# Patient Record
Sex: FEMALE | Race: WHITE | NOT HISPANIC OR LATINO | Employment: OTHER | URBAN - METROPOLITAN AREA
[De-identification: names, ages, dates, MRNs, and addresses within clinical notes are randomized per-mention and may not be internally consistent; named-entity substitution may affect disease eponyms.]

---

## 2019-05-17 ENCOUNTER — OFFICE VISIT (OUTPATIENT)
Dept: FAMILY MEDICINE CLINIC | Facility: CLINIC | Age: 84
End: 2019-05-17
Payer: MEDICARE

## 2019-05-17 VITALS
SYSTOLIC BLOOD PRESSURE: 116 MMHG | DIASTOLIC BLOOD PRESSURE: 74 MMHG | HEART RATE: 55 BPM | WEIGHT: 164.8 LBS | OXYGEN SATURATION: 96 %

## 2019-05-17 DIAGNOSIS — I10 ESSENTIAL HYPERTENSION: Primary | ICD-10-CM

## 2019-05-17 DIAGNOSIS — F03.90 DEMENTIA WITHOUT BEHAVIORAL DISTURBANCE, UNSPECIFIED DEMENTIA TYPE (HCC): ICD-10-CM

## 2019-05-17 DIAGNOSIS — E78.5 HYPERLIPIDEMIA, UNSPECIFIED HYPERLIPIDEMIA TYPE: ICD-10-CM

## 2019-05-17 DIAGNOSIS — G40.909 SEIZURE DISORDER (HCC): ICD-10-CM

## 2019-05-17 PROBLEM — F01.50 VASCULAR DEMENTIA WITHOUT BEHAVIORAL DISTURBANCE (HCC): Status: ACTIVE | Noted: 2019-05-17

## 2019-05-17 PROCEDURE — 99204 OFFICE O/P NEW MOD 45 MIN: CPT | Performed by: FAMILY MEDICINE

## 2019-05-17 RX ORDER — SIMVASTATIN 20 MG
TABLET ORAL
Refills: 0 | COMMUNITY
Start: 2019-03-14 | End: 2019-07-16 | Stop reason: SDUPTHER

## 2019-05-17 RX ORDER — GABAPENTIN 400 MG/1
CAPSULE ORAL
Refills: 2 | COMMUNITY
Start: 2019-04-14 | End: 2019-07-22 | Stop reason: SDUPTHER

## 2019-05-17 RX ORDER — LEVETIRACETAM 500 MG/1
TABLET ORAL
Refills: 2 | COMMUNITY
Start: 2019-03-14 | End: 2019-07-16 | Stop reason: SDUPTHER

## 2019-05-20 DIAGNOSIS — I10 ESSENTIAL HYPERTENSION: Primary | ICD-10-CM

## 2019-05-22 ENCOUNTER — TELEPHONE (OUTPATIENT)
Dept: FAMILY MEDICINE CLINIC | Facility: CLINIC | Age: 84
End: 2019-05-22

## 2019-07-16 DIAGNOSIS — I10 ESSENTIAL HYPERTENSION: ICD-10-CM

## 2019-07-16 DIAGNOSIS — E78.5 HYPERLIPIDEMIA, UNSPECIFIED HYPERLIPIDEMIA TYPE: ICD-10-CM

## 2019-07-16 DIAGNOSIS — G40.909 SEIZURE DISORDER (HCC): ICD-10-CM

## 2019-07-16 DIAGNOSIS — E78.5 HYPERLIPIDEMIA, UNSPECIFIED HYPERLIPIDEMIA TYPE: Primary | ICD-10-CM

## 2019-07-16 RX ORDER — LEVETIRACETAM 500 MG/1
1000 TABLET ORAL 2 TIMES DAILY
Qty: 360 TABLET | Refills: 2 | Status: SHIPPED | OUTPATIENT
Start: 2019-07-16 | End: 2019-07-22 | Stop reason: SDUPTHER

## 2019-07-16 RX ORDER — SIMVASTATIN 20 MG
20 TABLET ORAL
Qty: 90 TABLET | Refills: 1 | Status: SHIPPED | OUTPATIENT
Start: 2019-07-16 | End: 2019-07-22 | Stop reason: SDUPTHER

## 2019-07-16 RX ORDER — LEVETIRACETAM 500 MG/1
1000 TABLET ORAL 2 TIMES DAILY
Qty: 360 TABLET | Refills: 2 | Status: SHIPPED | OUTPATIENT
Start: 2019-07-16 | End: 2019-07-16 | Stop reason: SDUPTHER

## 2019-07-16 RX ORDER — SIMVASTATIN 20 MG
20 TABLET ORAL
Qty: 90 TABLET | Refills: 1 | Status: SHIPPED | OUTPATIENT
Start: 2019-07-16 | End: 2019-07-16 | Stop reason: SDUPTHER

## 2019-07-22 DIAGNOSIS — E78.5 HYPERLIPIDEMIA, UNSPECIFIED HYPERLIPIDEMIA TYPE: ICD-10-CM

## 2019-07-22 DIAGNOSIS — G40.909 SEIZURE DISORDER (HCC): Primary | ICD-10-CM

## 2019-07-22 DIAGNOSIS — I10 ESSENTIAL HYPERTENSION: ICD-10-CM

## 2019-07-22 RX ORDER — LEVETIRACETAM 500 MG/1
1000 TABLET ORAL 2 TIMES DAILY
Qty: 360 TABLET | Refills: 2 | Status: SHIPPED | OUTPATIENT
Start: 2019-07-22 | End: 2019-07-23 | Stop reason: SDUPTHER

## 2019-07-22 RX ORDER — SIMVASTATIN 20 MG
20 TABLET ORAL
Qty: 90 TABLET | Refills: 1 | Status: SHIPPED | OUTPATIENT
Start: 2019-07-22 | End: 2019-07-23 | Stop reason: SDUPTHER

## 2019-07-22 RX ORDER — GABAPENTIN 400 MG/1
400 CAPSULE ORAL 2 TIMES DAILY
Qty: 180 CAPSULE | Refills: 2 | Status: SHIPPED | OUTPATIENT
Start: 2019-07-22 | End: 2020-02-16

## 2019-07-23 DIAGNOSIS — I10 ESSENTIAL HYPERTENSION: ICD-10-CM

## 2019-07-23 DIAGNOSIS — E78.5 HYPERLIPIDEMIA, UNSPECIFIED HYPERLIPIDEMIA TYPE: ICD-10-CM

## 2019-07-23 DIAGNOSIS — G40.909 SEIZURE DISORDER (HCC): ICD-10-CM

## 2019-07-23 RX ORDER — SIMVASTATIN 20 MG
20 TABLET ORAL
Qty: 90 TABLET | Refills: 1 | Status: SHIPPED | OUTPATIENT
Start: 2019-07-23 | End: 2019-07-29

## 2019-07-23 RX ORDER — LEVETIRACETAM 500 MG/1
1000 TABLET ORAL 2 TIMES DAILY
Qty: 360 TABLET | Refills: 2 | Status: SHIPPED | OUTPATIENT
Start: 2019-07-23 | End: 2019-10-09

## 2019-07-29 ENCOUNTER — TELEPHONE (OUTPATIENT)
Dept: FAMILY MEDICINE CLINIC | Facility: CLINIC | Age: 84
End: 2019-07-29

## 2019-07-29 DIAGNOSIS — E78.5 HYPERLIPIDEMIA, UNSPECIFIED HYPERLIPIDEMIA TYPE: Primary | ICD-10-CM

## 2019-07-29 RX ORDER — LOVASTATIN 20 MG/1
20 TABLET ORAL
Qty: 90 TABLET | Refills: 3 | Status: SHIPPED | OUTPATIENT
Start: 2019-07-29 | End: 2020-05-07

## 2019-07-29 NOTE — TELEPHONE ENCOUNTER
Received letter from insurance that Zocor is not formulary  Pt received a 30 day supply  Alternative med to prescribe is Simvastin    If need to change: [facility] [phone]

## 2019-08-02 ENCOUNTER — CONSULT (OUTPATIENT)
Dept: NEUROLOGY | Facility: CLINIC | Age: 84
End: 2019-08-02
Payer: MEDICARE

## 2019-08-02 VITALS
SYSTOLIC BLOOD PRESSURE: 126 MMHG | HEIGHT: 65 IN | BODY MASS INDEX: 26.99 KG/M2 | HEART RATE: 52 BPM | DIASTOLIC BLOOD PRESSURE: 60 MMHG | WEIGHT: 162 LBS

## 2019-08-02 DIAGNOSIS — F02.80 ALZHEIMER'S DEMENTIA WITHOUT BEHAVIORAL DISTURBANCE, UNSPECIFIED TIMING OF DEMENTIA ONSET (HCC): ICD-10-CM

## 2019-08-02 DIAGNOSIS — I63.412 CEREBROVASCULAR ACCIDENT (CVA) DUE TO EMBOLISM OF LEFT MIDDLE CEREBRAL ARTERY (HCC): ICD-10-CM

## 2019-08-02 DIAGNOSIS — G30.9 ALZHEIMER'S DEMENTIA WITHOUT BEHAVIORAL DISTURBANCE, UNSPECIFIED TIMING OF DEMENTIA ONSET (HCC): ICD-10-CM

## 2019-08-02 DIAGNOSIS — G40.209 PARTIAL SYMPTOMATIC EPILEPSY WITH COMPLEX PARTIAL SEIZURES, NOT INTRACTABLE, WITHOUT STATUS EPILEPTICUS (HCC): Primary | ICD-10-CM

## 2019-08-02 PROCEDURE — 99204 OFFICE O/P NEW MOD 45 MIN: CPT | Performed by: PSYCHIATRY & NEUROLOGY

## 2019-08-02 RX ORDER — LEVETIRACETAM 250 MG/1
250 TABLET ORAL 2 TIMES DAILY
Qty: 60 TABLET | Refills: 5 | Status: SHIPPED | OUTPATIENT
Start: 2019-08-02 | End: 2019-10-09

## 2019-08-02 RX ORDER — DONEPEZIL HYDROCHLORIDE 5 MG/1
5 TABLET, FILM COATED ORAL
Qty: 28 TABLET | Refills: 0 | Status: SHIPPED | OUTPATIENT
Start: 2019-08-02 | End: 2019-09-18

## 2019-08-02 RX ORDER — DONEPEZIL HYDROCHLORIDE 10 MG/1
10 TABLET, FILM COATED ORAL
Qty: 30 TABLET | Refills: 5 | Status: SHIPPED | OUTPATIENT
Start: 2019-08-02 | End: 2019-09-18

## 2019-08-02 NOTE — LETTER
August 2, 2019     Derrick Marcos MD  [address] [facility]    Patient: Guadalupe Herrera   YOB: 1935   Date of Visit: 8/2/2019       Dear Dr Guillermina Vo: Thank you for referring Guadalupe Herrera to me for evaluation  Below are my notes for this consultation  If you have questions, please do not hesitate to call me  I look forward to following your patient along with you  Sincerely,        Carole Alanis MD        CC: No Recipients  Carole Alanis MD  8/2/2019  2:41 PM  Incomplete  Guadalupe Herrera is a 81 y o  female who presents today with history of seizures, dementia and CVA    Assessment:  1  Partial symptomatic epilepsy with complex partial seizures, not intractable, without status epilepticus ([facility] [address] )    2  Alzheimer's dementia without behavioral disturbance, unspecified timing of dementia onset    3  Cerebrovascular accident (CVA) due to embolism of left middle cerebral artery (HCC)        Plan:  Increase Keppra to 1250 mg b i d  Initiate Aricept 5 mg at HS then 10 mg  Aspirin 81 mg daily  EEG  Follow-up 2 months    Discussion:  Lanette Blank has a long history of partial complex epilepsy and is presently on a 1000 mg of Keppra twice daily  Records from her previous neurologist not available at the time of this dictation  She continues to have seizures a few times a month  Have recommended increasing her Keppra to 1250 mg b i d  I have also recommended EEG  She also has dementia, probable dementia Alzheimer's type  Have recommended initiating Aricept 5 mg at bedtime followed by 10 mg after 4 weeks  We did discuss potential adverse effects of her medication  She has a remote history of CVA and have recommended initiating aspirin daily for stroke prevention and continuing statin therapy  She also has findings consistent with peripheral neuropathy    I will see her back in follow-up in 2 months      Subjective:    HPI  Lanette Blank is a right-handed woman who presents today with accompanied by her daughter and her caregiver with the above complaints  Her daughter reports that since the age around 26 she has had seizures  She states in recent years her seizures are under better control, happening less often and not as severe  She no longer has shaking episodes with them but typically just stares off with mouth movements  Afterwards she is fatigued and has increased difficulty with language function  Presently she is on Keppra a 1000 mg twice daily and tolerates it well  She still has on average 2 or 3 seizures per month  She had a stroke in 2001 following surgery and was felt to be embolic in nature  She was left with severe aphasia and left-sided weakness  After several months of rehabilitation she is ambulating eating and has some speech  Over the last 4 5 years she has declined cognitively to the point where she needs constant supervision for her ADLs  She is not able to cook or dress herself  Her  passed away in April and they have a live-in caretaker for the last year and she seems to be doing well with this  Past Medical History:   Diagnosis Date    Dementia     17 years ago     Epilepsy ([facility] [address] )     Essential hypertension 5/17/2019    Hypercholesterolemia     Stroke [facility])        Family History:  Family History   Problem Relation Age of Onset    No Known Problems Mother     No Known Problems Father     No Known Problems Daughter        Past Surgical History:  Past Surgical History:   Procedure Laterality Date    CATARACT EXTRACTION, BILATERAL      3-4 years ago 8/2/2019    HYSTERECTOMY      KNEE SURGERY         Social History:   reports that she has never smoked  She has never used smokeless tobacco  She reports that she drank alcohol  She reports that she does not use drugs  Allergies:  Patient has no known allergies        Current Outpatient Medications:     aspirin 81 MG tablet, Take 1 tablet (81 mg total) by mouth daily, Disp: 30 tablet, Rfl: 5   donepezil (ARICEPT) 10 mg tablet, Take 1 tablet (10 mg total) by mouth daily at bedtime Start after 5 mg titration, Disp: 30 tablet, Rfl: 5    donepezil (ARICEPT) 5 mg tablet, Take 1 tablet (5 mg total) by mouth daily at bedtime, Disp: 28 tablet, Rfl: 0    gabapentin (NEURONTIN) 400 mg capsule, Take 1 capsule (400 mg total) by mouth 2 (two) times a day, Disp: 180 capsule, Rfl: 2    levETIRAcetam (KEPPRA) 250 mg tablet, Take 1 tablet (250 mg total) by mouth 2 (two) times a day With 2 500 mg Keppra pills twice daily, Disp: 60 tablet, Rfl: 5    levETIRAcetam (KEPPRA) 500 mg tablet, Take 2 tablets (1,000 mg total) by mouth 2 (two) times a day, Disp: 360 tablet, Rfl: 2    lovastatin (MEVACOR) 20 mg tablet, Take 1 tablet (20 mg total) by mouth daily at bedtime, Disp: 90 tablet, Rfl: 3    metoprolol tartrate (LOPRESSOR) 25 mg tablet, Take 1 tablet (25 mg total) by mouth every 12 (twelve) hours Please dispense generic, Disp: 180 tablet, Rfl: 1    I have reviewed the past medical, social and family history, current medications, allergies, vitals, review of systems and updated this information as appropriate today     Objective:    Vitals:  Blood pressure 126/60, pulse (!) 52, height 5' 4 75" (1 645 m), weight 73 5 kg (162 lb)  Physical Exam    Neurological Exam    GENERAL:  Cooperative in no acute distress  Well-developed and well-nourished    HEAD and NECK   Head is atraumatic normocephalic with no lesions or masses  Neck is supple with full range of motion    CARDIOVASCULAR  Carotid Arteries-no carotid bruits  NEUROLOGIC:  Mental Status-the patient is awake and alert  with expressive greater than receptive aphasia  She cannot tell me the month date year or location  Short-term memory is poor  Cranial Nerves: Visual fields are full to confrontation  Discs are flat  Extraocular movements are full without nystagmus  Pupils are 2-1/2 mm and reactive  Face is symmetrical to light touch   Movements of facial expression reveal a right facial asymmetry  Hearing is diminished on the right to finger rub  Soft palate lifts symmetrically  Shoulder shrug is symmetrical  Tongue is midline without atrophy  Motor:  A right upper extremity drift is noted on arm extension with a right fix on arm roll  Strength is full in the upper and lower extremities with normal bulk and tone  Sensory:  Absent temperature and vibratory sensation in the distal lower extremities bilaterally  Cortical function is intact  Coordination: Finger to nose testing is performed accurately  Romberg is reveals increased sway  Gait reveals a right hemiparetic gait pattern with decreased arm swing on the right  Reflexes:  Trace to 1 in the biceps, triceps, brachioradialis, knee jerk and absent at the ankle jerk regions, brisker on the right than left  Toes are downgoing on the left and plus-minus upgoing on the right            ROS:    Review of Systems   Constitutional: Positive for fatigue  Negative for appetite change and fever  HENT: Negative  Negative for hearing loss, tinnitus, trouble swallowing and voice change  Eyes: Negative  Negative for photophobia and pain  Respiratory: Negative  Negative for shortness of breath  Cardiovascular: Negative  Negative for palpitations  Gastrointestinal: Negative  Negative for nausea and vomiting  Endocrine: Negative  Negative for cold intolerance and heat intolerance  Genitourinary: Negative  Negative for dysuria, frequency and urgency  Musculoskeletal: Positive for arthralgias (leg ) and gait problem (unsteady)  Negative for myalgias and neck pain  Skin: Negative  Negative for rash  Neurological: Negative for dizziness, tremors, seizures, syncope, facial asymmetry, speech difficulty, weakness, light-headedness, numbness and headaches  Hematological: Negative  Does not bruise/bleed easily  Psychiatric/Behavioral: Positive for confusion   Negative for hallucinations and sleep disturbance

## 2019-08-02 NOTE — PROGRESS NOTES
Xiomy Galloway is a 81 y o  female who presents today with history of seizures, dementia and CVA    Assessment:  1  Partial symptomatic epilepsy with complex partial seizures, not intractable, without status epilepticus ([address] )    2  Alzheimer's dementia without behavioral disturbance, unspecified timing of dementia onset    3  Cerebrovascular accident (CVA) due to embolism of left middle cerebral artery (HCC)        Plan:  Increase Keppra to 1250 mg b i d  Initiate Aricept 5 mg at HS then 10 mg  Aspirin 81 mg daily  EEG  Follow-up 2 months    Discussion:  Adrianne Walton has a long history of partial complex epilepsy and is presently on a 1000 mg of Keppra twice daily  Records from her previous neurologist not available at the time of this dictation  She continues to have seizures a few times a month  Have recommended increasing her Keppra to 1250 mg b i d  I have also recommended EEG  She also has dementia, probable dementia Alzheimer's type  Have recommended initiating Aricept 5 mg at bedtime followed by 10 mg after 4 weeks  We did discuss potential adverse effects of her medication  She has a remote history of CVA and have recommended initiating aspirin daily for stroke prevention and continuing statin therapy  She also has findings consistent with peripheral neuropathy  I will see her back in follow-up in 2 months      Subjective:    HPI  Adrianne Walton is a right-handed woman who presents today with accompanied by her daughter and her caregiver with the above complaints  Her daughter reports that since the age around 26 she has had seizures  She states in recent years her seizures are under better control, happening less often and not as severe  She no longer has shaking episodes with them but typically just stares off with mouth movements  Afterwards she is fatigued and has increased difficulty with language function  Presently she is on Keppra a 1000 mg twice daily and tolerates it well    She still has on average 2 or 3 seizures per month  She had a stroke in 2001 following surgery and was felt to be embolic in nature  She was left with severe aphasia and left-sided weakness  After several months of rehabilitation she is ambulating eating and has some speech  Over the last 4 5 years she has declined cognitively to the point where she needs constant supervision for her ADLs  She is not able to cook or dress herself  Her  passed away in April and they have a live-in caretaker for the last year and she seems to be doing well with this  Past Medical History:   Diagnosis Date    Dementia     17 years ago     Epilepsy ([address] )     Essential hypertension 5/17/2019    Hypercholesterolemia     Stroke [facility])        Family History:  Family History   Problem Relation Age of Onset    No Known Problems Mother     No Known Problems Father     No Known Problems Daughter        Past Surgical History:  Past Surgical History:   Procedure Laterality Date    CATARACT EXTRACTION, BILATERAL      3-4 years ago 8/2/2019    HYSTERECTOMY      KNEE SURGERY         Social History:   reports that she has never smoked  She has never used smokeless tobacco  She reports that she drank alcohol  She reports that she does not use drugs  Allergies:  Patient has no known allergies        Current Outpatient Medications:     aspirin 81 MG tablet, Take 1 tablet (81 mg total) by mouth daily, Disp: 30 tablet, Rfl: 5    donepezil (ARICEPT) 10 mg tablet, Take 1 tablet (10 mg total) by mouth daily at bedtime Start after 5 mg titration, Disp: 30 tablet, Rfl: 5    donepezil (ARICEPT) 5 mg tablet, Take 1 tablet (5 mg total) by mouth daily at bedtime, Disp: 28 tablet, Rfl: 0    gabapentin (NEURONTIN) 400 mg capsule, Take 1 capsule (400 mg total) by mouth 2 (two) times a day, Disp: 180 capsule, Rfl: 2    levETIRAcetam (KEPPRA) 250 mg tablet, Take 1 tablet (250 mg total) by mouth 2 (two) times a day With 2 500 mg Keppra pills twice daily, Disp: 60 tablet, Rfl: 5    levETIRAcetam (KEPPRA) 500 mg tablet, Take 2 tablets (1,000 mg total) by mouth 2 (two) times a day, Disp: 360 tablet, Rfl: 2    lovastatin (MEVACOR) 20 mg tablet, Take 1 tablet (20 mg total) by mouth daily at bedtime, Disp: 90 tablet, Rfl: 3    metoprolol tartrate (LOPRESSOR) 25 mg tablet, Take 1 tablet (25 mg total) by mouth every 12 (twelve) hours Please dispense generic, Disp: 180 tablet, Rfl: 1    I have reviewed the past medical, social and family history, current medications, allergies, vitals, review of systems and updated this information as appropriate today     Objective:    Vitals:  Blood pressure 126/60, pulse (!) 52, height 5' 4 75" (1 645 m), weight 73 5 kg (162 lb)  Physical Exam    Neurological Exam    GENERAL:  Cooperative in no acute distress  Well-developed and well-nourished    HEAD and NECK   Head is atraumatic normocephalic with no lesions or masses  Neck is supple with full range of motion    CARDIOVASCULAR  Carotid Arteries-no carotid bruits  NEUROLOGIC:  Mental Status-the patient is awake and alert  with expressive greater than receptive aphasia  She cannot tell me the month date year or location  Short-term memory is poor  Cranial Nerves: Visual fields are full to confrontation  Discs are flat  Extraocular movements are full without nystagmus  Pupils are 2-1/2 mm and reactive  Face is symmetrical to light touch  Movements of facial expression reveal a right facial asymmetry  Hearing is diminished on the right to finger rub  Soft palate lifts symmetrically  Shoulder shrug is symmetrical  Tongue is midline without atrophy  Motor:  A right upper extremity drift is noted on arm extension with a right fix on arm roll  Strength is full in the upper and lower extremities with normal bulk and tone  Sensory:  Absent temperature and vibratory sensation in the distal lower extremities bilaterally  Cortical function is intact    Coordination: Finger to nose testing is performed accurately  Romberg is reveals increased sway  Gait reveals a right hemiparetic gait pattern with decreased arm swing on the right  Reflexes:  Trace to 1 in the biceps, triceps, brachioradialis, knee jerk and absent at the ankle jerk regions, brisker on the right than left  Toes are downgoing on the left and plus-minus upgoing on the right            ROS:    Review of Systems   Constitutional: Positive for fatigue  Negative for appetite change and fever  HENT: Negative  Negative for hearing loss, tinnitus, trouble swallowing and voice change  Eyes: Negative  Negative for photophobia and pain  Respiratory: Negative  Negative for shortness of breath  Cardiovascular: Negative  Negative for palpitations  Gastrointestinal: Negative  Negative for nausea and vomiting  Endocrine: Negative  Negative for cold intolerance and heat intolerance  Genitourinary: Negative  Negative for dysuria, frequency and urgency  Musculoskeletal: Positive for arthralgias (leg ) and gait problem (unsteady)  Negative for myalgias and neck pain  Skin: Negative  Negative for rash  Neurological: Negative for dizziness, tremors, seizures, syncope, facial asymmetry, speech difficulty, weakness, light-headedness, numbness and headaches  Hematological: Negative  Does not bruise/bleed easily  Psychiatric/Behavioral: Positive for confusion  Negative for hallucinations and sleep disturbance

## 2019-08-09 ENCOUNTER — CLINICAL SUPPORT (OUTPATIENT)
Dept: FAMILY MEDICINE CLINIC | Facility: CLINIC | Age: 84
End: 2019-08-09
Payer: MEDICARE

## 2019-08-09 DIAGNOSIS — Z11.1 SCREENING EXAMINATION FOR PULMONARY TUBERCULOSIS: Primary | ICD-10-CM

## 2019-08-09 PROCEDURE — 86580 TB INTRADERMAL TEST: CPT

## 2019-08-12 LAB
INDURATION: 0 MM
TB SKIN TEST: NEGATIVE

## 2019-09-06 ENCOUNTER — HOSPITAL ENCOUNTER (OUTPATIENT)
Dept: NEUROLOGY | Facility: HOSPITAL | Age: 84
Discharge: HOME | End: 2019-09-06
Attending: PSYCHIATRY & NEUROLOGY
Payer: MEDICARE

## 2019-09-06 DIAGNOSIS — G40.209 PARTIAL SYMPTOMATIC EPILEPSY WITH COMPLEX PARTIAL SEIZURES, NOT INTRACTABLE, WITHOUT STATUS EPILEPTICUS (HCC): ICD-10-CM

## 2019-09-06 PROCEDURE — 95816 EEG AWAKE AND DROWSY: CPT

## 2019-09-09 PROCEDURE — 95816 EEG AWAKE AND DROWSY: CPT | Performed by: PSYCHIATRY & NEUROLOGY

## 2019-09-10 ENCOUNTER — TELEPHONE (OUTPATIENT)
Dept: NEUROLOGY | Facility: CLINIC | Age: 84
End: 2019-09-10

## 2019-09-10 NOTE — TELEPHONE ENCOUNTER
I left a message on Beatrice's voicemail regarding the results of the EEG and if she has further questions or concerns she should notify me

## 2019-09-17 ENCOUNTER — TELEPHONE (OUTPATIENT)
Dept: GERIATRICS | Age: 84
End: 2019-09-17

## 2019-09-17 NOTE — TELEPHONE ENCOUNTER
Left message reminding daughter/patient of appointment 9/18/19 at 3 PM   Asked Yvette Garzon to call the office if they are unable to keep appointment

## 2019-09-18 ENCOUNTER — OFFICE VISIT (OUTPATIENT)
Dept: GERIATRICS | Age: 84
End: 2019-09-18
Payer: MEDICARE

## 2019-09-18 VITALS
DIASTOLIC BLOOD PRESSURE: 56 MMHG | BODY MASS INDEX: 27.03 KG/M2 | OXYGEN SATURATION: 93 % | SYSTOLIC BLOOD PRESSURE: 118 MMHG | WEIGHT: 168.2 LBS | HEIGHT: 66 IN | HEART RATE: 54 BPM | TEMPERATURE: 98.4 F

## 2019-09-18 DIAGNOSIS — F02.80 ALZHEIMER'S DEMENTIA WITHOUT BEHAVIORAL DISTURBANCE, UNSPECIFIED TIMING OF DEMENTIA ONSET (HCC): ICD-10-CM

## 2019-09-18 DIAGNOSIS — G30.9 ALZHEIMER'S DEMENTIA WITHOUT BEHAVIORAL DISTURBANCE, UNSPECIFIED TIMING OF DEMENTIA ONSET (HCC): ICD-10-CM

## 2019-09-18 DIAGNOSIS — E78.5 HYPERLIPIDEMIA, UNSPECIFIED HYPERLIPIDEMIA TYPE: ICD-10-CM

## 2019-09-18 DIAGNOSIS — R26.2 AMBULATORY DYSFUNCTION: ICD-10-CM

## 2019-09-18 DIAGNOSIS — I10 ESSENTIAL HYPERTENSION: ICD-10-CM

## 2019-09-18 DIAGNOSIS — G40.909 SEIZURE DISORDER (HCC): Primary | ICD-10-CM

## 2019-09-18 PROCEDURE — 99205 OFFICE O/P NEW HI 60 MIN: CPT | Performed by: FAMILY MEDICINE

## 2019-09-18 RX ORDER — DONEPEZIL HYDROCHLORIDE 10 MG/1
10 TABLET, FILM COATED ORAL
Qty: 30 TABLET | Refills: 1 | OUTPATIENT
Start: 2019-09-18 | End: 2019-09-18 | Stop reason: SDUPTHER

## 2019-09-18 RX ORDER — DONEPEZIL HYDROCHLORIDE 10 MG/1
10 TABLET, FILM COATED ORAL
Qty: 90 TABLET | Refills: 3
Start: 2019-09-18 | End: 2019-09-18

## 2019-09-18 RX ORDER — MULTIVITAMIN WITH IRON
1 TABLET ORAL DAILY
COMMUNITY

## 2019-09-18 RX ORDER — MULTIVIT WITH MINERALS/LUTEIN
1000 TABLET ORAL DAILY
COMMUNITY
End: 2019-11-19

## 2019-09-18 RX ORDER — DONEPEZIL HYDROCHLORIDE 10 MG/1
10 TABLET, FILM COATED ORAL
Qty: 90 TABLET | Refills: 1 | Status: CANCELLED | OUTPATIENT
Start: 2019-10-18 | End: 2020-04-15

## 2019-09-18 RX ORDER — LANOLIN ALCOHOL/MO/W.PET/CERES
1000 CREAM (GRAM) TOPICAL DAILY
COMMUNITY
End: 2019-10-09 | Stop reason: ALTCHOICE

## 2019-09-18 RX ORDER — DONEPEZIL HYDROCHLORIDE 10 MG/1
10 TABLET, FILM COATED ORAL
Qty: 90 TABLET | Refills: 1 | Status: SHIPPED | OUTPATIENT
Start: 2019-09-18 | End: 2019-09-18

## 2019-09-18 NOTE — PROGRESS NOTES
Assessment & Plan:     [facility] was seen today for geriatric evaluation  Diagnoses and all orders for this visit:    Seizure disorder ([address] )  On Keppra 250 mg and 500 mg  Recommended by Neurologist   Will follow up with the Neurologist     Alzheimer's dementia without behavioral disturbance, unspecified timing of dementia onset  -     TSH, 3rd generation with T4 reflex; Future  -     CBC and Platelet; Future  -     Comprehensive metabolic panel; Future  -     Vitamin B12; Future  -     Folate; Future  -     Vitamin D 25 hydroxy; Future  -     donepezil (ARICEPT) 10 mg tablet; Take 1 tablet (10 mg total) by mouth daily at bedtime    Patient and family was given recommendation to saty active with the senior care and day care plan  encouraged patient to be play some puzzle game and brain stimulating game and be active socially, mentally and physically  Continue support with her ADLs and iADLs  Patient could not able to complete her [facility] test and she scored 3/15 in GDS Fall precaution  Healthy and balanced diet  Follow up in 6 months   For other chronic conditions follow up with her primary care    Hyperlipidemia, unspecified hyperlipidemia type    Essential hypertension    Ambulatory dysfunction  Fall precaution  Need assistant with all trance  Continue Vitamin D 2000 units  Encourage walker or wheel chair as needed        HPI:  We had the pleasure of evaluating Bebe Lover who is a 81 y o  female with her daughter and her care giver  Patient lives with her care giver in her house  The patient had past medical hx of  CVA, Seizers, DVT/PE,  Demnetia and Hypercholestrimia  She gets seizer episodes once every other month  Daughter and the care giver states she gets silent seizer when she gets distresses  Patient lost er spouse in the month of July / 2018  Now recently it has been reduced than before    Patient takes Keppra and Neurontin and follow up with her  Neurologist   Patient had he stroke in 2001 and was not able to walk, speak and do her own ADLs  After getting rehabilitation she regained her walking, and does all her minimum ADLs  According to her daughter she has more decline  in her function, cognition and she lives with a maid now for past one year  Patient has Adult day care visit twice a week and goes to senior care at [facility] once a week  Patient has good appetite and sleep and no hx of recent falls  She recently gained weight  Patient recently starated using more of wheel chair than before due to her knee issues  She does not drives anymore and all her iADLS and ADLS are taken care by her daughter and the helper  Daughter is here to get the medication reconsolidate and want prescription for  r Aricept 10 mg and complete blood work  Daughter stated that she wants to continue her mother under the same care plan with a helper and with weekly 3 times to senior care and day care  Today patient was not able to complete her MoCA test and she scored 3/ 15 in her Geriatric depression scale  She was not able to complete even before with her Neurologist      Patient seen with Dr Niki Cash noticed since 2001 after stroke   Memory affected:    yes  Symptoms started: after her stroke  Over time the memory has:  worsened  Memory issue(s) were noted by: family   Patient has difficulties with memory     She has problems operating household appliance such as TV remote, kitchen appliances, computer       She has difficulty finding the right word while speaking: Yes  Patient requires repeat information or ask the same question repeatedly: Yes  Do you drive: No       Have you had any recent accidents, citations or getting lost in familiar places :No  Do you handle your own financial affairs such as balancing your checkbook, paying bills, investments: No  Do have any difficulties with handling your financial affairs: Yes  Have you or your family noted any change in your mood or personality:No  Are you currently or have you been treated in the past for depression or anxiety: No  Have you noticed any gait or balance disorder: Yes  Uses :Wheelchair: yes  Any hallucination or delusion: No  Fluctuation in alertness: No  Sleep Issues: No  Urinary/Stool Incontinence: Yes urine and dbowel  Hearing and vision issue: No  Do you have POA:Yes  Do you have a Living will Yes  Past Medical, surgical, social, medication and allergy history and patients previous records reviewed  Family Review of Behavior [facility]:    pacing  No    agressive/combative behavior  Yes    agitated  Yes   wandering  No   resistance to care  No   hoarding/hiding objects  No    suspicious  No  withdrawn NoNo  inappropriate sexual behaviorNo  rummaging/pillaging  No    misplacing/losing objects Yes  personal hygiene problems  Yes  forgetfulness of actions Yes   temper outbursts  No     throwing items No      Family member with dementia and what type? No  Have you had any head trauma No  Does patient have history of alcohol abuse No      ROS: Review of Systems   Constitutional: Negative  HENT: Negative for hearing loss, trouble swallowing and voice change  Eyes: Negative for discharge and visual disturbance  Respiratory: Negative for cough, chest tightness, shortness of breath and wheezing  Cardiovascular: Negative for chest pain, palpitations and leg swelling  Gastrointestinal: Negative for abdominal distention, abdominal pain and constipation  Genitourinary: Negative for difficulty urinating, dysuria and frequency  Musculoskeletal: Positive for gait problem  Negative for arthralgias, back pain and joint swelling  Knee pain on and off   Neurological: Negative for dizziness, speech difficulty, weakness, light-headedness and numbness  Psychiatric/Behavioral: Negative for agitation, confusion, hallucinations and sleep disturbance  The patient is not nervous/anxious  All other systems reviewed and are negative        Allergies: No Known Allergies    Medications:      Current Outpatient Medications:     Ascorbic Acid (VITAMIN C) 1000 MG tablet, Take 1,000 mg by mouth daily, Disp: , Rfl:     aspirin 81 MG tablet, Take 1 tablet (81 mg total) by mouth daily, Disp: 30 tablet, Rfl: 5    B Complex-C (B-COMPLEX WITH VITAMIN C) tablet, Take 1 tablet by mouth daily, Disp: , Rfl:     Calcium-Magnesium-Zinc 333-133-5 MG TABS, Take 1 tablet by mouth daily, Disp: , Rfl:     Cholecalciferol (VITAMIN D3) 5000 units TABS, Take 5,000 Units by mouth daily, Disp: , Rfl:     gabapentin (NEURONTIN) 400 mg capsule, Take 1 capsule (400 mg total) by mouth 2 (two) times a day, Disp: 180 capsule, Rfl: 2    levETIRAcetam (KEPPRA) 250 mg tablet, Take 1 tablet (250 mg total) by mouth 2 (two) times a day With 2 500 mg Keppra pills twice daily, Disp: 60 tablet, Rfl: 5    levETIRAcetam (KEPPRA) 500 mg tablet, Take 2 tablets (1,000 mg total) by mouth 2 (two) times a day, Disp: 360 tablet, Rfl: 2    lovastatin (MEVACOR) 20 mg tablet, Take 1 tablet (20 mg total) by mouth daily at bedtime, Disp: 90 tablet, Rfl: 3    metoprolol tartrate (LOPRESSOR) 25 mg tablet, Take 1 tablet (25 mg total) by mouth every 12 (twelve) hours Please dispense generic, Disp: 180 tablet, Rfl: 1    Multiple Vitamins-Minerals ([employer] SPECTRAVITE ADULT GUMMIES PO), Take 1 each by mouth daily, Disp: , Rfl:     vitamin B-12 (VITAMIN B-12) 1,000 mcg tablet, Take 1,000 mcg by mouth daily, Disp: , Rfl:     donepezil (ARICEPT) 10 mg tablet, Take 1 tablet (10 mg total) by mouth daily at bedtime Start after 5 mg titration (Patient not taking: Reported on 9/18/2019), Disp: 30 tablet, Rfl: 5    donepezil (ARICEPT) 10 mg tablet, Take 1 tablet (10 mg total) by mouth daily at bedtime, Disp: 90 tablet, Rfl: 3    donepezil (ARICEPT) 5 mg tablet, Take 1 tablet (5 mg total) by mouth daily at bedtime (Patient not taking: Reported on 9/18/2019), Disp: 28 tablet, Rfl: 0    Vitals:  Vitals:    09/18/19 1526 BP: 118/56   Pulse: (!) 54   Temp: 98 4 °F (36 9 °C)   SpO2: 93%       History:  Past Medical History:   Diagnosis Date    Dementia     17 years ago     Epilepsy ([address] )     Essential hypertension 5/17/2019    Hypercholesterolemia     Stroke [facility])      Past Surgical History:   Procedure Laterality Date    CATARACT EXTRACTION, BILATERAL      3-4 years ago 8/2/2019    HYSTERECTOMY      KNEE SURGERY       Family History   Problem Relation Age of Onset    No Known Problems Mother     No Known Problems Father     No Known Problems Daughter      Social History     Socioeconomic History    Marital status:       Spouse name: Not on file    Number of children: Not on file    Years of education: Not on file    Highest education level: Not on file   Occupational History    Not on file   Social Needs    Financial resource strain: Not on file    Food insecurity:     Worry: Not on file     Inability: Not on file    Transportation needs:     Medical: Not on file     Non-medical: Not on file   Tobacco Use    Smoking status: Never Smoker    Smokeless tobacco: Never Used   Substance and Sexual Activity    Alcohol use: Not Currently     Frequency: Never    Drug use: Never    Sexual activity: Not Currently     Comment: recently    Lifestyle    Physical activity:     Days per week: Not on file     Minutes per session: Not on file    Stress: Not on file   Relationships    Social connections:     Talks on phone: Not on file     Gets together: Not on file     Attends Protestant service: Not on file     Active member of club or organization: Not on file     Attends meetings of clubs or organizations: Not on file     Relationship status: Not on file    Intimate partner violence:     Fear of current or ex partner: Not on file     Emotionally abused: Not on file     Physically abused: Not on file     Forced sexual activity: Not on file   Other Topics Concern    Not on file   Social History Narrative    Not on file     Past Surgical History:   Procedure Laterality Date    CATARACT EXTRACTION, BILATERAL      3-4 years ago 8/2/2019    HYSTERECTOMY      KNEE SURGERY           Physical Exam:   Physical Exam   Constitutional: She is oriented to person, place, and time  She appears well-developed and well-nourished  No distress  HENT:   Head: Normocephalic and atraumatic  Right Ear: External ear normal    Left Ear: External ear normal    Mouth/Throat: Oropharynx is clear and moist    Eyes: Pupils are equal, round, and reactive to light  Conjunctivae and EOM are normal  Right eye exhibits no discharge  Left eye exhibits no discharge  Neck: Normal range of motion  Neck supple  No JVD present  No thyromegaly present  Cardiovascular: Normal rate, regular rhythm and normal heart sounds  Exam reveals no friction rub  No murmur heard  Pulmonary/Chest: Effort normal and breath sounds normal  No respiratory distress  She has no wheezes  Abdominal: Soft  Bowel sounds are normal  She exhibits no distension  Musculoskeletal: She exhibits edema  She exhibits no tenderness or deformity  Neurological: She is alert and oriented to person, place, and time  No cranial nerve deficit or sensory deficit  Psychiatric: She has a normal mood and affect  Her behavior is normal    Nursing note and vitals reviewed

## 2019-09-18 NOTE — PROGRESS NOTES
[address]  [phone]   Intake      SOCIAL HISTORY  Patient: Simona Granados  Date:9/18/2019    Current Living Situation: Full-time caretaker, Antelmo Garibay, live-in aide (live about 20 minutes from daughter) - daughter retired past June and moved to [facility]  In February moved mom and Antelmo Garibay to the area  Has had Antelmo Garibay a little over a year, [place] from [place] to [address]  in [place] two days per week- subsidized through [employer] of referral: Daughter referred    Reason for referral: Family member concerns regarding ongoing care, vascular dementia  When were behaviors/symptoms first noticed? 2001 after her stroke    Please provide examples: Couldn't walk, feed herself, intensive rehab at [facility]    Patients main concern(s): Unable to disclose    Caregiver main concern(s): Keeping Yulissa comfortable, happy, and in a home environment as long as possible  Last few months, mobility has declined, legs seem weaker  Is respite needed for caregiver(s)? Not reported- system currently is working      Who is available to provide care in case of illness or crisis (please specify relation to patient and level of care able to provide)? Nancy's daughter trade off    FAMILY BACKGROUND    Marital status:      Does patient have children? Three children- one in [facility], one in [place], one elsewhere   Where do the children live?       Family members assisting patient at home: Daughter    Employment History    Currently Employed:No  Retired: No    Type of employment: No    Educational History    Highest Level of Education: [employer] (No Diploma) quit school second year of high school     Service    : No     Benefits Describe (if applicable): N/A - Yulissa's , who passed away, did serve for 4 years however their daughter recently went to the [place] re: benefits and was told there were no benefits as he did not serve active duty    FINANCIAL    Total Monthly income: Not disclosed  Source of income: Not disclosed    Meet current needs? "For now"    Funds available for home care? Yes     Funds available for nursing home? No    Do you rent or own your home? Own    Relationships    Are any relationships causing problems right now: No    [address] and Organizations:  in [place], senior center in [place] for lunch and activities    Major life events in past two years (ex: custodial, death in family, major illness etc ): Move from [address] [place] [address] to [place], father (Yulissa's ) passed away    Does the patient currently drive: No  If not, date stopped driving: Never really drove    [address] [place] [address] which have helped with shopping, meals, bathing, etc : [employer] transportation    Services that assessment team feels would be beneficial to patient/family: None currently as Yulissa's daughter reports being very happy with her in-home care services    LEGAL    Advanced directives: Daughter reports she would like to set up healthcare proxy/updated will  Daughter is currently POA  HOME SAFETY ASSESSMENT     ENVIRONMENT SAFETY    FIRE HAZARDS  Does the residence have smoke alarm? Yes     Does the residence have a fire escape? Yes   Are any of the following present in the residence? Frayed Wires       No   Clutter        No   Incorrect use of open flame     No    FALL HAZARDS  Do any of the following exist in the residence? Poor lighting       No   Loose Rugs       No   Obstacles       No   Uneven floors       No   Slippery floors       No   Unsafe stairs       No  Does the patient use a fall alert? No   If yes, which one? N/A    HEALTH HAZARDS   Does the residence have any of the following?    Adequate plumbing      Yes    Adequate heat       Yes    Adequate ventilation      Yes   Are there signs of neglect such as the following? Unkempt house      No   Old food in refrigerator     No   Infestation       No    EMERGENCY HEALTH PLAN   Is there a phone that is accessible to the patient or caregiver? Yes - but she does not use it  Is the number to police, physician, and 911 easily accessible? N/A  Would the patient be able to call 911 in an emergency? No    ENVIRONMENT APPROPRIATENESS  Please note if each is available and accessible to the patient:   [address]        Yes   Kitchen        Yes   Living Room        Yes     Is the patient able to climb stairs if necessary? Yes - going up very slowly  Does the patient use any assistant devices for ambulation? Yes    If yes please list which device required   Walker at home, wheelchair when going out    Does the bathroom have any of the following? Handrails in tub or toilet     Yes    Raised toilet seat      Yes    Rubber tub mat      Yes    Hot water       Yes     Can the patient independently do the following? Shower       No   Dress them selves      No    Pick appropriate clothing     No   Eat        No   Drink        No     Wandering at night - daughter does report alarms are on doors for safety         FUNCTIONAL ACTIVITIES QUESTIONNAIRE         Informant or Patient: Daughter    Instructions:  Place a check stiven under the column that best describes the patient's ability to perform the tasks listed below:    TASK Completely unable to perform task  (3 points) Requires  Assistance  (2 points) Has difficulty but accomplishes task, or has never done, but the informant feels could do task with difficulty  (1 point) Normal performance, or has never done task, but the informant feels the patient could do the task if necessary  (0 points)   1  Writing checks, paying bills, balancing checkbook 3      2  Assembling tax records, business affairs, or papers 3      3  Shopping alone for clothes,  1  Household necessities or groceries  3      4   Playing a game of skill, working on a hobby 3      5  Heating water, making a cup of coffee, turning off the stove 3      6  Preparing a balanced meal   3      7  Keeping track of current events   3      8  Paying attention to, understanding, discussing a TV show, book, or magazine 3      9  Remembering appointments, family occasions, holidays, medications 3      10   Traveling out of the neighborhood, driving, arranging to take buses 3        [facility] ________30_________     Adapted and reprinted with permission from Tj Peña  1982;[id number](3):[id number]

## 2019-09-18 NOTE — PATIENT INSTRUCTIONS
Alzheimer's dementia without behavioral disturbance, unspecified timing of dementia onset  -     TSH, 3rd generation with T4 reflex; Future  -     CBC and Platelet; Future  -     Comprehensive metabolic panel; Future  -     Vitamin B12; Future  -     Folate; Future  -     Vitamin D 25 hydroxy; Future  -     donepezil (ARICEPT) 10 mg tablet;  Take 1 tablet (10 mg total) by mouth daily at bedtime

## 2019-09-19 RX ORDER — DONEPEZIL HYDROCHLORIDE 10 MG/1
10 TABLET, FILM COATED ORAL
Qty: 90 TABLET | Refills: 1 | Status: SHIPPED | OUTPATIENT
Start: 2019-09-19 | End: 2019-10-09

## 2019-09-20 ENCOUNTER — APPOINTMENT (OUTPATIENT)
Dept: LAB | Facility: HOSPITAL | Age: 84
End: 2019-09-20
Payer: MEDICARE

## 2019-09-20 DIAGNOSIS — G30.9 ALZHEIMER'S DEMENTIA WITHOUT BEHAVIORAL DISTURBANCE, UNSPECIFIED TIMING OF DEMENTIA ONSET (HCC): ICD-10-CM

## 2019-09-20 DIAGNOSIS — F02.80 ALZHEIMER'S DEMENTIA WITHOUT BEHAVIORAL DISTURBANCE, UNSPECIFIED TIMING OF DEMENTIA ONSET (HCC): ICD-10-CM

## 2019-09-20 LAB
25(OH)D3 SERPL-MCNC: 53.8 NG/ML (ref 30–100)
ALBUMIN SERPL BCP-MCNC: 3.8 G/DL (ref 3.5–5)
ALP SERPL-CCNC: 113 U/L (ref 46–116)
ALT SERPL W P-5'-P-CCNC: 21 U/L (ref 12–78)
ANION GAP SERPL CALCULATED.3IONS-SCNC: 7 MMOL/L (ref 4–13)
AST SERPL W P-5'-P-CCNC: 12 U/L (ref 5–45)
BILIRUB SERPL-MCNC: 0.71 MG/DL (ref 0.2–1)
BUN SERPL-MCNC: 14 MG/DL (ref 5–25)
CALCIUM SERPL-MCNC: 9.5 MG/DL (ref 8.3–10.1)
CHLORIDE SERPL-SCNC: 109 MMOL/L (ref 100–108)
CO2 SERPL-SCNC: 29 MMOL/L (ref 21–32)
CREAT SERPL-MCNC: 0.8 MG/DL (ref 0.6–1.3)
ERYTHROCYTE [DISTWIDTH] IN BLOOD BY AUTOMATED COUNT: 16.5 % (ref 11.6–15.1)
FOLATE SERPL-MCNC: >20 NG/ML (ref 3.1–17.5)
GFR SERPL CREATININE-BSD FRML MDRD: 68 ML/MIN/1.73SQ M
GLUCOSE P FAST SERPL-MCNC: 79 MG/DL (ref 65–99)
HCT VFR BLD AUTO: 38.2 % (ref 34.8–46.1)
HGB BLD-MCNC: 11.3 G/DL (ref 11.5–15.4)
MCH RBC QN AUTO: 22 PG (ref 26.8–34.3)
MCHC RBC AUTO-ENTMCNC: 29.6 G/DL (ref 31.4–37.4)
MCV RBC AUTO: 74 FL (ref 82–98)
PLATELET # BLD AUTO: 334 THOUSANDS/UL (ref 149–390)
PMV BLD AUTO: 11.3 FL (ref 8.9–12.7)
POTASSIUM SERPL-SCNC: 4.1 MMOL/L (ref 3.5–5.3)
PROT SERPL-MCNC: 7 G/DL (ref 6.4–8.2)
RBC # BLD AUTO: 5.14 MILLION/UL (ref 3.81–5.12)
SODIUM SERPL-SCNC: 145 MMOL/L (ref 136–145)
TSH SERPL DL<=0.05 MIU/L-ACNC: 1.81 UIU/ML (ref 0.36–3.74)
VIT B12 SERPL-MCNC: 399 PG/ML (ref 100–900)
WBC # BLD AUTO: 8.36 THOUSAND/UL (ref 4.31–10.16)

## 2019-09-20 PROCEDURE — 80053 COMPREHEN METABOLIC PANEL: CPT

## 2019-09-20 PROCEDURE — 82306 VITAMIN D 25 HYDROXY: CPT

## 2019-09-20 PROCEDURE — 36415 COLL VENOUS BLD VENIPUNCTURE: CPT

## 2019-09-20 PROCEDURE — 82607 VITAMIN B-12: CPT

## 2019-09-20 PROCEDURE — 84443 ASSAY THYROID STIM HORMONE: CPT

## 2019-09-20 PROCEDURE — 82746 ASSAY OF FOLIC ACID SERUM: CPT

## 2019-09-20 PROCEDURE — 85027 COMPLETE CBC AUTOMATED: CPT

## 2019-10-03 ENCOUNTER — APPOINTMENT (EMERGENCY)
Dept: RADIOLOGY | Facility: HOSPITAL | Age: 84
DRG: 871 | End: 2019-10-03
Attending: EMERGENCY MEDICINE
Payer: MEDICARE

## 2019-10-03 ENCOUNTER — HOSPITAL ENCOUNTER (INPATIENT)
Facility: HOSPITAL | Age: 84
LOS: 3 days | Discharge: HOME HEALTH | DRG: 871 | End: 2019-10-06
Attending: EMERGENCY MEDICINE | Admitting: INTERNAL MEDICINE
Payer: MEDICARE

## 2019-10-03 ENCOUNTER — APPOINTMENT (EMERGENCY)
Dept: CT IMAGING | Facility: HOSPITAL | Age: 84
DRG: 871 | End: 2019-10-03
Payer: MEDICARE

## 2019-10-03 ENCOUNTER — APPOINTMENT (INPATIENT)
Dept: ULTRASOUND IMAGING | Facility: HOSPITAL | Age: 84
DRG: 871 | End: 2019-10-03
Payer: MEDICARE

## 2019-10-03 DIAGNOSIS — N39.0 UTI (URINARY TRACT INFECTION): ICD-10-CM

## 2019-10-03 DIAGNOSIS — R41.82 ALTERED MENTAL STATUS, UNSPECIFIED ALTERED MENTAL STATUS TYPE: Primary | ICD-10-CM

## 2019-10-03 DIAGNOSIS — N17.9 AKI (ACUTE KIDNEY INJURY) (HCC): ICD-10-CM

## 2019-10-03 PROBLEM — R65.20 SEVERE SEPSIS (HCC): Status: ACTIVE | Noted: 2019-10-03

## 2019-10-03 PROBLEM — G92.8 TOXIC METABOLIC ENCEPHALOPATHY: Status: ACTIVE | Noted: 2019-10-03

## 2019-10-03 PROBLEM — A41.9 SEVERE SEPSIS (HCC): Status: ACTIVE | Noted: 2019-10-03

## 2019-10-03 LAB
ALBUMIN SERPL BCP-MCNC: 4.2 G/DL (ref 3.5–5.7)
ALP SERPL-CCNC: 89 U/L (ref 55–165)
ALT SERPL W P-5'-P-CCNC: 19 U/L (ref 7–52)
ANION GAP SERPL CALCULATED.3IONS-SCNC: 10 MMOL/L (ref 4–13)
ANISOCYTOSIS BLD QL SMEAR: PRESENT
APTT PPP: 30 SECONDS (ref 23–37)
AST SERPL W P-5'-P-CCNC: 40 U/L (ref 13–39)
BACTERIA UR QL AUTO: ABNORMAL /HPF
BASO STIPL BLD QL SMEAR: PRESENT
BILIRUB SERPL-MCNC: 0.8 MG/DL (ref 0.2–1)
BILIRUB UR QL STRIP: NEGATIVE
BUN SERPL-MCNC: 26 MG/DL (ref 7–25)
CALCIUM SERPL-MCNC: 9.7 MG/DL (ref 8.6–10.5)
CHLORIDE SERPL-SCNC: 104 MMOL/L (ref 98–107)
CLARITY UR: ABNORMAL
CO2 SERPL-SCNC: 26 MMOL/L (ref 21–31)
COLOR UR: YELLOW
CREAT SERPL-MCNC: 1.53 MG/DL (ref 0.6–1.2)
ERYTHROCYTE [DISTWIDTH] IN BLOOD BY AUTOMATED COUNT: 15.7 % (ref 11.5–14.5)
ETHANOL SERPL-MCNC: <10 MG/DL
GFR SERPL CREATININE-BSD FRML MDRD: 31 ML/MIN/1.73SQ M
GIANT PLATELETS BLD QL SMEAR: PRESENT
GLUCOSE SERPL-MCNC: 116 MG/DL (ref 65–99)
GLUCOSE UR STRIP-MCNC: NEGATIVE MG/DL
HCT VFR BLD AUTO: 36.1 % (ref 42–47)
HGB BLD-MCNC: 11.4 G/DL (ref 12–16)
HGB UR QL STRIP.AUTO: ABNORMAL
HYPERCHROMIA BLD QL SMEAR: PRESENT
INR PPP: 1.15 (ref 0.9–1.5)
KETONES UR STRIP-MCNC: ABNORMAL MG/DL
L PNEUMO1 AG UR QL IA.RAPID: NEGATIVE
LACTATE SERPL-SCNC: 1.5 MMOL/L (ref 0.5–2)
LEUKOCYTE ESTERASE UR QL STRIP: ABNORMAL
LYMPHOCYTES # BLD AUTO: 1.85 THOUSAND/UL (ref 0.6–4.47)
LYMPHOCYTES # BLD AUTO: 8 % (ref 20–51)
MCH RBC QN AUTO: 22.1 PG (ref 26–34)
MCHC RBC AUTO-ENTMCNC: 31.5 G/DL (ref 31–37)
MCV RBC AUTO: 70 FL (ref 81–99)
METAMYELOCYTES NFR BLD MANUAL: 2 % (ref 0–1)
MICROCYTES BLD QL AUTO: PRESENT
MONOCYTES # BLD AUTO: 0.92 THOUSAND/UL (ref 0–1.22)
MONOCYTES NFR BLD AUTO: 4 % (ref 4–12)
MUCOUS THREADS UR QL AUTO: ABNORMAL
NEUTS BAND NFR BLD MANUAL: 11 % (ref 0–8)
NEUTS SEG # BLD: 19.87 THOUSAND/UL (ref 1.81–6.82)
NEUTS SEG NFR BLD AUTO: 75 % (ref 42–75)
NITRITE UR QL STRIP: POSITIVE
NON-SQ EPI CELLS URNS QL MICRO: ABNORMAL /HPF
PH UR STRIP.AUTO: 5.5 [PH]
PLATELET # BLD AUTO: 253 THOUSANDS/UL (ref 149–390)
PLATELET BLD QL SMEAR: ADEQUATE
PMV BLD AUTO: 8.5 FL (ref 8.6–11.7)
POTASSIUM SERPL-SCNC: 4.8 MMOL/L (ref 3.5–5.5)
PROT SERPL-MCNC: 7.1 G/DL (ref 6.4–8.9)
PROT UR STRIP-MCNC: ABNORMAL MG/DL
PROTHROMBIN TIME: 13.4 SECONDS (ref 10.2–13)
RBC # BLD AUTO: 5.14 MILLION/UL (ref 3.9–5.2)
RBC #/AREA URNS AUTO: ABNORMAL /HPF
RBC MORPH BLD: ABNORMAL
S PNEUM AG UR QL: NEGATIVE
SCHISTOCYTES BLD QL SMEAR: PRESENT
SODIUM SERPL-SCNC: 140 MMOL/L (ref 134–143)
SP GR UR STRIP.AUTO: 1.02 (ref 1–1.03)
TOTAL CELLS COUNTED SPEC: 100
TROPONIN I SERPL-MCNC: 0.04 NG/ML
TROPONIN I SERPL-MCNC: 0.06 NG/ML
UROBILINOGEN UR QL STRIP.AUTO: 0.2 E.U./DL
WBC # BLD AUTO: 23.1 THOUSAND/UL (ref 4.8–10.8)
WBC #/AREA URNS AUTO: ABNORMAL /HPF

## 2019-10-03 PROCEDURE — 70450 CT HEAD/BRAIN W/O DYE: CPT

## 2019-10-03 PROCEDURE — 96360 HYDRATION IV INFUSION INIT: CPT

## 2019-10-03 PROCEDURE — 80053 COMPREHEN METABOLIC PANEL: CPT | Performed by: EMERGENCY MEDICINE

## 2019-10-03 PROCEDURE — 36415 COLL VENOUS BLD VENIPUNCTURE: CPT | Performed by: EMERGENCY MEDICINE

## 2019-10-03 PROCEDURE — 99223 1ST HOSP IP/OBS HIGH 75: CPT | Performed by: INTERNAL MEDICINE

## 2019-10-03 PROCEDURE — 87077 CULTURE AEROBIC IDENTIFY: CPT | Performed by: EMERGENCY MEDICINE

## 2019-10-03 PROCEDURE — 87449 NOS EACH ORGANISM AG IA: CPT | Performed by: INTERNAL MEDICINE

## 2019-10-03 PROCEDURE — 99285 EMERGENCY DEPT VISIT HI MDM: CPT

## 2019-10-03 PROCEDURE — 85027 COMPLETE CBC AUTOMATED: CPT | Performed by: EMERGENCY MEDICINE

## 2019-10-03 PROCEDURE — 1123F ACP DISCUSS/DSCN MKR DOCD: CPT | Performed by: NURSE PRACTITIONER

## 2019-10-03 PROCEDURE — 80320 DRUG SCREEN QUANTALCOHOLS: CPT | Performed by: EMERGENCY MEDICINE

## 2019-10-03 PROCEDURE — 84484 ASSAY OF TROPONIN QUANT: CPT | Performed by: INTERNAL MEDICINE

## 2019-10-03 PROCEDURE — 85610 PROTHROMBIN TIME: CPT | Performed by: EMERGENCY MEDICINE

## 2019-10-03 PROCEDURE — G0008 ADMIN INFLUENZA VIRUS VAC: HCPCS | Performed by: INTERNAL MEDICINE

## 2019-10-03 PROCEDURE — 71045 X-RAY EXAM CHEST 1 VIEW: CPT

## 2019-10-03 PROCEDURE — 90662 IIV NO PRSV INCREASED AG IM: CPT | Performed by: INTERNAL MEDICINE

## 2019-10-03 PROCEDURE — 87186 SC STD MICRODIL/AGAR DIL: CPT | Performed by: EMERGENCY MEDICINE

## 2019-10-03 PROCEDURE — 76770 US EXAM ABDO BACK WALL COMP: CPT

## 2019-10-03 PROCEDURE — 87086 URINE CULTURE/COLONY COUNT: CPT | Performed by: EMERGENCY MEDICINE

## 2019-10-03 PROCEDURE — 87040 BLOOD CULTURE FOR BACTERIA: CPT | Performed by: EMERGENCY MEDICINE

## 2019-10-03 PROCEDURE — 84484 ASSAY OF TROPONIN QUANT: CPT | Performed by: EMERGENCY MEDICINE

## 2019-10-03 PROCEDURE — 81001 URINALYSIS AUTO W/SCOPE: CPT | Performed by: EMERGENCY MEDICINE

## 2019-10-03 PROCEDURE — 83605 ASSAY OF LACTIC ACID: CPT | Performed by: EMERGENCY MEDICINE

## 2019-10-03 PROCEDURE — 85007 BL SMEAR W/DIFF WBC COUNT: CPT | Performed by: EMERGENCY MEDICINE

## 2019-10-03 PROCEDURE — 85730 THROMBOPLASTIN TIME PARTIAL: CPT | Performed by: EMERGENCY MEDICINE

## 2019-10-03 RX ORDER — HEPARIN SODIUM 5000 [USP'U]/ML
5000 INJECTION, SOLUTION INTRAVENOUS; SUBCUTANEOUS EVERY 8 HOURS SCHEDULED
Status: DISCONTINUED | OUTPATIENT
Start: 2019-10-03 | End: 2019-10-03

## 2019-10-03 RX ORDER — ONDANSETRON 2 MG/ML
4 INJECTION INTRAMUSCULAR; INTRAVENOUS EVERY 6 HOURS PRN
Status: DISCONTINUED | OUTPATIENT
Start: 2019-10-03 | End: 2019-10-06 | Stop reason: HOSPADM

## 2019-10-03 RX ORDER — CEFTRIAXONE 1 G/50ML
1000 INJECTION, SOLUTION INTRAVENOUS ONCE
Status: COMPLETED | OUTPATIENT
Start: 2019-10-03 | End: 2019-10-03

## 2019-10-03 RX ORDER — SODIUM CHLORIDE 9 MG/ML
75 INJECTION, SOLUTION INTRAVENOUS ONCE
Status: COMPLETED | OUTPATIENT
Start: 2019-10-03 | End: 2019-10-03

## 2019-10-03 RX ORDER — CEFTRIAXONE 1 G/50ML
1000 INJECTION, SOLUTION INTRAVENOUS EVERY 24 HOURS
Status: DISCONTINUED | OUTPATIENT
Start: 2019-10-04 | End: 2019-10-06

## 2019-10-03 RX ORDER — DONEPEZIL HYDROCHLORIDE 5 MG/1
10 TABLET, FILM COATED ORAL
Status: DISCONTINUED | OUTPATIENT
Start: 2019-10-03 | End: 2019-10-06 | Stop reason: HOSPADM

## 2019-10-03 RX ORDER — ASPIRIN 81 MG/1
81 TABLET, CHEWABLE ORAL DAILY
Status: DISCONTINUED | OUTPATIENT
Start: 2019-10-03 | End: 2019-10-06 | Stop reason: HOSPADM

## 2019-10-03 RX ORDER — HEPARIN SODIUM 5000 [USP'U]/ML
5000 INJECTION, SOLUTION INTRAVENOUS; SUBCUTANEOUS EVERY 12 HOURS SCHEDULED
Status: DISCONTINUED | OUTPATIENT
Start: 2019-10-03 | End: 2019-10-06 | Stop reason: HOSPADM

## 2019-10-03 RX ORDER — ACETAMINOPHEN 325 MG/1
650 TABLET ORAL EVERY 6 HOURS PRN
Status: DISCONTINUED | OUTPATIENT
Start: 2019-10-03 | End: 2019-10-06 | Stop reason: HOSPADM

## 2019-10-03 RX ORDER — LEVETIRACETAM 250 MG/1
250 TABLET ORAL 2 TIMES DAILY
Status: DISCONTINUED | OUTPATIENT
Start: 2019-10-03 | End: 2019-10-06 | Stop reason: HOSPADM

## 2019-10-03 RX ORDER — PRAVASTATIN SODIUM 20 MG
20 TABLET ORAL
Status: DISCONTINUED | OUTPATIENT
Start: 2019-10-03 | End: 2019-10-06 | Stop reason: HOSPADM

## 2019-10-03 RX ORDER — LEVETIRACETAM 500 MG/1
1000 TABLET ORAL 2 TIMES DAILY
Status: DISCONTINUED | OUTPATIENT
Start: 2019-10-03 | End: 2019-10-06 | Stop reason: HOSPADM

## 2019-10-03 RX ADMIN — CEFTRIAXONE 1000 MG: 1 INJECTION, SOLUTION INTRAVENOUS at 14:39

## 2019-10-03 RX ADMIN — SODIUM CHLORIDE 75 ML/HR: 9 INJECTION, SOLUTION INTRAVENOUS at 16:13

## 2019-10-03 RX ADMIN — INFLUENZA A VIRUS A/MICHIGAN/45/2015 X-275 (H1N1) ANTIGEN (FORMALDEHYDE INACTIVATED), INFLUENZA A VIRUS A/SINGAPORE/INFIMH-16-0019/2016 IVR-186 (H3N2) ANTIGEN (FORMALDEHYDE INACTIVATED), AND INFLUENZA B VIRUS B/MARYLAND/15/2016 BX-69A (A B/COLORADO/6/2017-LIKE VIRUS) ANTIGEN (FORMALDEHYDE INACTIVATED) 0.5 ML: 60; 60; 60 INJECTION, SUSPENSION INTRAMUSCULAR at 16:22

## 2019-10-03 RX ADMIN — SODIUM CHLORIDE 1000 ML: 0.9 INJECTION, SOLUTION INTRAVENOUS at 13:04

## 2019-10-03 RX ADMIN — LEVETIRACETAM 1000 MG: 500 TABLET, FILM COATED ORAL at 17:32

## 2019-10-03 NOTE — PLAN OF CARE
Problem: GENITOURINARY - ADULT  Goal: Maintains or returns to baseline urinary function  Description  INTERVENTIONS:  - Assess urinary function  - Encourage oral fluids to ensure adequate hydration if ordered  - Administer IV fluids as ordered to ensure adequate hydration  - Administer ordered medications as needed  - Offer frequent toileting  - Follow urinary retention protocol if ordered  10/3/2019 1841 by Angela Sullivan, RN  Outcome: Progressing  10/3/2019 1840 by Angela Sullivan, RN  Outcome: Progressing

## 2019-10-03 NOTE — ASSESSMENT & PLAN NOTE
· Likely secondary to dehydration and sepsis  · Continue IV fluids  · Renal ultrasound result appreciated  · Monitor creatinine, if worsening will consider Nephrology consultation

## 2019-10-03 NOTE — H&P
H&P- Houston Tidwell 1935, 81 y o  female MRN: [id number]    Unit/Bed#: -02 Encounter: [id number]    Primary Care Provider: Marisela Palmer MD   Date and time admitted to hospital: 10/3/2019 12:18 PM      * Severe sepsis [facility])  Assessment & Plan  · Due to UTI  · Patient with leukocytosis and tachypnea  Also with organ dysfunction given that patient has acute renal failure  · Will continue IV antibiotics with ceftriaxone  · Follow up cultures  · Trend procalcitonin  · IV fluids as needed  · Lactate within normal limits    Toxic metabolic encephalopathy  Assessment & Plan  · CT head with no acute findings  · Likely secondary to sepsis  · Continue supportive care    Acute renal failure (ARF) (HCC)  Assessment & Plan  · Likely secondary to dehydration and sepsis  · Continue IV fluids  · Renal ultrasound result appreciated  · Monitor creatinine, if worsening will consider Nephrology consultation    Dementia without behavioral disturbance (HCC)  Assessment & Plan  · Continue Aricept  · Supportive care    Essential hypertension  Assessment & Plan  · BP stable  · Continue home meds    Seizure disorder ([address] )  Assessment & Plan  · Continue Keppra    VTE Prophylaxis: Heparin  Code Status:  DNR  POLST: There is no POLST form on file for this patient (pre-hospital)  Discussion with family:  Discussed plan of care and code status with daughter at bedside    Anticipated Length of Stay:  Patient will be admitted on an Inpatient basis with an anticipated length of stay of  > 2 midnights  Justification for Hospital Stay:  Sepsis    Chief Complaint:   Altered mental status    History of Present Illness:    Houston Tidwell is a 81 y o  female who presents with altered mental status  Patient was at her  program this morning when she was noted to be excessively drowsy  Patient was noted to fall asleep during routine activities    This was unusual for patient, as she is typically awake during her day program   Per daughter at bedside patient was noted to be a little bit more fatigued and weak yesterday  No fever or chills  Patient was brought to the ER for further evaluation  In the ER CT head was performed which did not show any acute findings  Patient was found to have UTI and started on antibiotics  No recent hospitalizations  Review of Systems:  Review of Systems   Unable to perform ROS: Dementia       Past Medical and Surgical History:   Past Medical History:   Diagnosis Date    Acute renal failure (ARF) [facility] [address] ) 10/3/2019    [facility]     17 years ago     [facility]     Essential hypertension 5/17/2019    Hypercholesterolemia     Stroke [facility])        Past Surgical History:   Procedure Laterality Date    CATARACT EXTRACTION, BILATERAL      3-4 years ago 8/2/2019    HYSTERECTOMY      KNEE SURGERY         Meds/Allergies:  Prior to Admission medications    Medication Sig Start Date End Date Taking?  Authorizing Provider   Ascorbic Acid (VITAMIN C) 1000 MG tablet Take 1,000 mg by mouth daily    Historical Provider, MD   aspirin 81 MG tablet Take 1 tablet (81 mg total) by mouth daily 8/2/19   Edwige Faust MD   B Complex-C (B-COMPLEX WITH VITAMIN C) tablet Take 1 tablet by mouth daily    Historical Provider, MD   Calcium-Magnesium-Zinc 042-888-6 MG TABS Take 1 tablet by mouth daily    Historical Provider, MD   Cholecalciferol (VITAMIN D3) 5000 units TABS Take 5,000 Units by mouth daily    Historical Provider, MD   donepezil (ARICEPT) 10 mg tablet Take 1 tablet (10 mg total) by mouth daily at bedtime 9/19/19   Arabella Evangelista MD   gabapentin (NEURONTIN) 400 mg capsule Take 1 capsule (400 mg total) by mouth 2 (two) times a day 7/22/19   Dylan Forde MD   levETIRAcetam (KEPPRA) 250 mg tablet Take 1 tablet (250 mg total) by mouth 2 (two) times a day With 2 500 mg Keppra pills twice daily 8/2/19   Edwige Faust MD   levETIRAcetam (KEPPRA) 500 mg tablet Take 2 tablets (1,000 mg total) by mouth 2 (two) times a day 7/23/19   Ella Vazquez MD   lovastatin (MEVACOR) 20 mg tablet Take 1 tablet (20 mg total) by mouth daily at bedtime 7/29/19   Ella Vazquez MD   metoprolol tartrate (LOPRESSOR) 25 mg tablet Take 1 tablet (25 mg total) by mouth every 12 (twelve) hours Please dispense generic 7/23/19   Ella Vazquez MD   Multiple Vitamins-Minerals ([employer] SPECTRAVITE ADULT GUMMIES PO) Take 1 each by mouth daily    Historical Provider, MD   vitamin B-12 (VITAMIN B-12) 1,000 mcg tablet Take 1,000 mcg by mouth daily    Historical Provider, MD     I have reviewed home medications with patient personally  Allergies: No Known Allergies    Social History:  Marital Status:    Occupation:  None  Patient Pre-hospital Living Situation:  Lives with 24 hour caregiver  Patient Pre-hospital Level of Mobility:  Minimal ambulation with assistive devices  Patient Pre-hospital Diet Restrictions:  None  Substance Use History:     Social History     Substance and Sexual Activity   Alcohol Use Not Currently    Frequency: Never     Social History     Tobacco Use   Smoking Status Never Smoker   Smokeless Tobacco Never Used     Social History     Substance and Sexual Activity   Drug Use Never       Family History:  I have reviewed the patients family history    Physical Exam:   Vitals:   Blood Pressure: 131/57 (10/03/19 1602)  Pulse: 59 (10/03/19 1602)  Temperature: 97 5 °F (36 4 °C) (10/03/19 1602)  Temp Source: Tympanic (10/03/19 1602)  Respirations: 18 (10/03/19 1602)  Weight - Scale: 68 kg (150 lb) (10/03/19 1220)  SpO2: 98 % (10/03/19 1602)    Physical Exam   Constitutional: No distress  Frail elderly female   HENT:   Head: Normocephalic and atraumatic  Eyes: Conjunctivae and EOM are normal    Neck: Normal range of motion  Neck supple  Cardiovascular: Normal rate and regular rhythm  Pulmonary/Chest: Effort normal  No respiratory distress  Abdominal: Soft  She exhibits no distension  There is no tenderness     Musculoskeletal: Generalized weakness of bilateral lower extremities  Trace edema   Neurological:   Drowsy but arousable  Follows simple commands   Skin: Skin is warm and dry  Psychiatric:   Pleasantly confused       Additional Data:   Lab Results: I have personally reviewed pertinent reports  Results from last 7 days   Lab Units 10/03/19  1239   WBC Thousand/uL 23 10*   HEMOGLOBIN g/dL 11 4*   HEMATOCRIT % 36 1*   PLATELETS Thousands/uL 253   LYMPHO PCT % 8*   MONO PCT % 4     Results from last 7 days   Lab Units 10/03/19  1239   POTASSIUM mmol/L 4 8   CHLORIDE mmol/L 104   CO2 mmol/L 26   BUN mg/dL 26*   CREATININE mg/dL 1 53*   CALCIUM mg/dL 9 7   ALK PHOS U/L 89   ALT U/L 19   AST U/L 40*     Results from last 7 days   Lab Units 10/03/19  1239   INR  1 15               Imaging: I have personally reviewed pertinent reports  US kidney and bladder   Final Result by Socrates Arnold MD (10/03 6665)      No hydronephrosis or renal calculi  Left ureteral jet not seen  Workstation performed: [id number]         XR chest 1 view   Final Result by Socrates Arnold MD (10/03 2580)      No acute cardiopulmonary disease  Lateral right perihilar nodular density  Recommend CT  The study was marked in EPIC for significant notification  Workstation performed: [id number]         CT head without contrast   Final Result by Marshall Reynaga MD (10/03 1405)      No acute intracranial abnormality  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  Mild to moderate chronic small vessel ischemic changes and volume loss  Workstation performed: [id number]             NetAccess/Epic Records Reviewed: Yes     ** Please Note: This note has been constructed using a voice recognition system   **

## 2019-10-03 NOTE — ED PROVIDER NOTES
History  Chief Complaint   Patient presents with    Altered Mental Status     patient had an episode of altered mental status while at adult      AMS, hx dementia from day program, with report of increased confusion over baseline, no facial droop or focal weakness, no syncope, no reported seizure activity, also no reported vomiting/diarrhea/respiratory distress  No fall or trauma  Prior to Admission Medications   Prescriptions Last Dose Informant Patient Reported? Taking?    Ascorbic Acid (VITAMIN C) 1000 MG tablet   Yes No   Sig: Take 1,000 mg by mouth daily   B Complex-C (B-COMPLEX WITH VITAMIN C) tablet  Care Giver Yes No   Sig: Take 1 tablet by mouth daily   Calcium-Magnesium-Zinc 333-133-5 MG TABS  Care Giver Yes No   Sig: Take 1 tablet by mouth daily   Cholecalciferol (VITAMIN D3) 5000 units TABS  Care Giver Yes No   Sig: Take 5,000 Units by mouth daily   Multiple Vitamins-Minerals ([employer] SPECTRAVITE ADULT GUMMIES PO)  Care Giver Yes No   Sig: Take 1 each by mouth daily   aspirin 81 MG tablet   No No   Sig: Take 1 tablet (81 mg total) by mouth daily   donepezil (ARICEPT) 10 mg tablet   No No   Sig: Take 1 tablet (10 mg total) by mouth daily at bedtime   gabapentin (NEURONTIN) 400 mg capsule   No No   Sig: Take 1 capsule (400 mg total) by mouth 2 (two) times a day   levETIRAcetam (KEPPRA) 250 mg tablet   No No   Sig: Take 1 tablet (250 mg total) by mouth 2 (two) times a day With 2 500 mg Keppra pills twice daily   levETIRAcetam (KEPPRA) 500 mg tablet   No No   Sig: Take 2 tablets (1,000 mg total) by mouth 2 (two) times a day   lovastatin (MEVACOR) 20 mg tablet   No No   Sig: Take 1 tablet (20 mg total) by mouth daily at bedtime   metoprolol tartrate (LOPRESSOR) 25 mg tablet   No No   Sig: Take 1 tablet (25 mg total) by mouth every 12 (twelve) hours Please dispense generic   vitamin B-12 (VITAMIN B-12) 1,000 mcg tablet  Care Giver Yes No   Sig: Take 1,000 mcg by mouth daily Facility-Administered Medications: None       Past Medical History:   Diagnosis Date    Dementia ([facility] [address] )     17 years ago     Epilepsy ([facility] )     Essential hypertension 5/17/2019    Hypercholesterolemia     Stroke [facility])        Past Surgical History:   Procedure Laterality Date    CATARACT EXTRACTION, BILATERAL      3-4 years ago 8/2/2019    HYSTERECTOMY      KNEE SURGERY         Family History   Problem Relation Age of Onset    No Known Problems Mother     No Known Problems Father     No Known Problems Daughter      I have reviewed and agree with the history as documented  Social History     Tobacco Use    Smoking status: Never Smoker    Smokeless tobacco: Never Used   Substance Use Topics    Alcohol use: Not Currently     Frequency: Never    Drug use: Never        Review of Systems   Unable to perform ROS: Dementia       Physical Exam  Physical Exam   Constitutional: She appears well-developed and well-nourished  HENT:   Head: Normocephalic and atraumatic  Nose: Nose normal    Mouth/Throat: Mucous membranes are dry  No oropharyngeal exudate  Eyes: Pupils are equal, round, and reactive to light  Conjunctivae and EOM are normal  No scleral icterus  Neck: Normal range of motion  Neck supple  No JVD present  No tracheal deviation present  Cardiovascular: Normal rate, regular rhythm and normal heart sounds  No murmur heard  Pulmonary/Chest: Effort normal and breath sounds normal  No respiratory distress  She has no wheezes  She has no rales  Abdominal: Soft  Bowel sounds are normal  There is no tenderness  There is no guarding  Musculoskeletal: Normal range of motion  She exhibits no edema  Neurological: She is alert  No cranial nerve deficit or sensory deficit  She exhibits normal muscle tone  5/5 motor, nl sens   Skin: Skin is warm and dry  Psychiatric: She has a normal mood and affect  Her behavior is normal    Nursing note and vitals reviewed        Vital Signs  ED Triage Vitals [10/03/19 1220]   Temperature Pulse Respirations Blood Pressure SpO2   98 6 °F (37 °C) 76 16 124/59 98 %      Temp Source Heart Rate Source Patient Position - Orthostatic VS BP Location FiO2 (%)   Temporal Monitor Sitting Right arm --      Pain Score       6           Vitals:    10/03/19 1350 10/03/19 1410 10/03/19 1440 10/03/19 1602   BP: 148/67   131/57   Pulse: 57 58 57 59   Patient Position - Orthostatic VS:    Lying         Visual Acuity      ED Medications  Medications   aspirin chewable tablet 81 mg (81 mg Oral Not Given 10/3/19 1608)   donepezil (ARICEPT) tablet 10 mg (has no administration in time range)   gabapentin (NEURONTIN) capsule 400 mg (has no administration in time range)   levETIRAcetam (KEPPRA) tablet 250 mg (has no administration in time range)   levETIRAcetam (KEPPRA) tablet 1,000 mg (has no administration in time range)   pravastatin (PRAVACHOL) tablet 20 mg (20 mg Oral Given 10/3/19 1622)   metoprolol tartrate (LOPRESSOR) tablet 25 mg (has no administration in time range)   ondansetron (ZOFRAN) injection 4 mg (has no administration in time range)   acetaminophen (TYLENOL) tablet 650 mg (has no administration in time range)   cefTRIAXone (ROCEPHIN) IVPB (premix) 1,000 mg (has no administration in time range)   heparin (porcine) subcutaneous injection 5,000 Units (has no administration in time range)   sodium chloride 0 9 % bolus 1,000 mL (1,000 mL Intravenous New Bag 10/3/19 1304)   cefTRIAXone (ROCEPHIN) IVPB (premix) 1,000 mg (1,000 mg Intravenous New Bag 10/3/19 1439)   sodium chloride 0 9 % infusion (75 mL/hr Intravenous New Bag 10/3/19 1613)   influenza vaccine, high-dose (FLUZONE HIGH-DOSE) IM injection DAMIAN 0 5 mL (0 5 mL Intramuscular Given 10/3/19 1622)       Diagnostic Studies  Results Reviewed     Procedure Component Value Units Date/Time    Lactic acid, plasma [[id number]]  (Normal) Collected:  10/03/19 1433    Lab Status:  Final result Specimen:  Blood from Arm, Left Updated:  10/03/19 1459     LACTIC ACID 1 5 mmol/L     Narrative:       Result may be elevated if tourniquet was used during collection  Blood culture #2 [[id number]] Collected:  10/03/19 1433    Lab Status: In process Specimen:  Blood from Arm, Left Updated:  10/03/19 1439    Blood culture #1 [[id number]] Collected:  10/03/19 1433    Lab Status: In process Specimen:  Blood from Hand, Left Updated:  10/03/19 1439    Urine Microscopic [[id number]]  (Abnormal) Collected:  10/03/19 1254    Lab Status:  Final result Specimen:  Urine, Straight Cath Updated:  10/03/19 1324     RBC, UA 4-10 /hpf      WBC, UA 10-20 /hpf      Epithelial Cells Moderate /hpf      Bacteria, UA Innumerable /hpf      MUCUS THREADS Occasional    Urine culture [[id number]] Collected:  10/03/19 1254    Lab Status:   In process Specimen:  Urine, Straight Cath Updated:  10/03/19 1323    CBC and differential [[id number]]  (Abnormal) Collected:  10/03/19 1239    Lab Status:  Final result Specimen:  Blood from Arm, Right Updated:  10/03/19 1315     WBC 23 10 Thousand/uL      RBC 5 14 Million/uL      Hemoglobin 11 4 g/dL      Hematocrit 36 1 %      MCV 70 fL      MCH 22 1 pg      MCHC 31 5 g/dL      RDW 15 7 %      MPV 8 5 fL      Platelets 412 Thousands/uL     Protime-INR [[id number]]  (Abnormal) Collected:  10/03/19 1239    Lab Status:  Final result Specimen:  Blood from Arm, Right Updated:  10/03/19 1315     Protime 13 4 seconds      INR 1 15    APTT [[id number]]  (Normal) Collected:  10/03/19 1239    Lab Status:  Final result Specimen:  Blood from Arm, Right Updated:  10/03/19 1315     PTT 30 seconds     Ethanol [[id number]]  (Normal) Collected:  10/03/19 1239    Lab Status:  Final result Specimen:  Blood from Arm, Right Updated:  10/03/19 1308     Ethanol Lvl <10 mg/dL     Troponin I [[id number]]  (Abnormal) Collected:  10/03/19 1239    Lab Status:  Final result Specimen:  Blood from Arm, Right Updated:  10/03/19 1308     Troponin I 0 06 ng/mL Comprehensive metabolic panel [[id number]]  (Abnormal) Collected:  10/03/19 1239    Lab Status:  Final result Specimen:  Blood from Arm, Right Updated:  10/03/19 1308     Sodium 140 mmol/L      Potassium 4 8 mmol/L      Chloride 104 mmol/L      CO2 26 mmol/L      ANION GAP 10 mmol/L      BUN 26 mg/dL      Creatinine 1 53 mg/dL      Glucose 116 mg/dL      Calcium 9 7 mg/dL      AST 40 U/L      ALT 19 U/L      Alkaline Phosphatase 89 U/L      Total Protein 7 1 g/dL      Albumin 4 2 g/dL      Total Bilirubin 0 80 mg/dL      eGFR 31 ml/min/1 73sq m     Narrative:       National Kidney Disease Foundation guidelines for Chronic Kidney Disease (CKD):     Stage 1 with normal or high GFR (GFR > 90 mL/min/1 73 square meters)    Stage 2 Mild CKD (GFR = 60-89 mL/min/1 73 square meters)    Stage 3A Moderate CKD (GFR = 45-59 mL/min/1 73 square meters)    Stage 3B Moderate CKD (GFR = 30-44 mL/min/1 73 square meters)    Stage 4 Severe CKD (GFR = 15-29 mL/min/1 73 square meters)    Stage 5 End Stage CKD (GFR <15 mL/min/1 73 square meters)  Note: GFR calculation is accurate only with a steady state creatinine    UA w Reflex to Microscopic w Reflex to Culture [[id number]]  (Abnormal) Collected:  10/03/19 1254    Lab Status:  Final result Specimen:  Urine, Straight Cath Updated:  10/03/19 1306     Color, UA Yellow     Clarity, UA Cloudy     Specific [place], UA 1 020     pH, UA 5 5     Leukocytes, UA Trace     Nitrite, UA Positive     Protein, UA 1+ mg/dl      Glucose, UA Negative mg/dl      Ketones, UA Trace mg/dl      Urobilinogen, UA 0 2 E U /dl      Bilirubin, UA Negative     Blood, UA 1+                 US kidney and bladder   Final Result by Eric Agee MD (10/03 9385)      No hydronephrosis or renal calculi  Left ureteral jet not seen  Workstation performed: [id number]         XR chest 1 view   Final Result by Eric Agee MD (10/03 7532)      No acute cardiopulmonary disease        Lateral right perihilar nodular density  Recommend CT  The study was marked in EPIC for significant notification  Workstation performed: [id number]         CT head without contrast   Final Result by Erica Canavan, MD (10/03 1404)      No acute intracranial abnormality  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  Mild to moderate chronic small vessel ischemic changes and volume loss  Workstation performed: [id number]                    Procedures  ECG 12 Lead Documentation Only  Date/Time: 10/3/2019 12:53 PM  Performed by: Tanner Dai MD  Authorized by: Tanner Dai MD     ECG reviewed by me, the ED Provider: yes    Patient location:  ED  Comments:      NSR at 60, ST-T waves WNL, QRS axis WNL, no PVCs; no acute ischemia            ED Course  ED Course as of Oct 03 1636   Thu Oct 03, 2019   1302 Initial Impression/MDM: AMS, hx dementia, non-specific, wide ddx includes infection such as UTI, dehydration, electrolyte abnormality; will screen with CT head/brain, CXR, labs and urine, start IVF, then re-evaluate           1310 Has UTI so Rocephin IV given; also has REYNALDO and IVF running      1321 Spoke with patient's daughter and caretaker in ED, w/initial results regarding UTI and REYNALDO and need for admission       1509 Discussed w/Dr Cindy Arriaza who will admit, and requests renal US                                  MDM    Disposition  Final diagnoses:    Altered mental status, unspecified altered mental status type   UTI (urinary tract infection)   REYNALDO (acute kidney injury) ([facility] [address] )     Time reflects when diagnosis was documented in both MDM as applicable and the Disposition within this note     Time User Action Codes Description Comment    10/3/2019  2:28 PM Mae Jiang [[id number]] Altered mental status, unspecified altered mental status type     10/3/2019  2:29 PM Mae Jiang [[id number]] UTI (urinary tract infection)     10/3/2019  2:29 PM Mae Jiang [[id number]] REYNALDO (acute kidney injury) [facility])       ED Disposition     ED Disposition Condition Date/Time Comment    Admit Stable Thu Oct 3, 2019  2:28 PM Case was discussed with Dr Carol Ann Fragoso and the patient's admission status was agreed to be Admission Status: inpatient status to the service of Dr Carol Ann Fragoso   Follow-up Information    None         Current Discharge Medication List      CONTINUE these medications which have NOT CHANGED    Details   Ascorbic Acid (VITAMIN C) 1000 MG tablet Take 1,000 mg by mouth daily      aspirin 81 MG tablet Take 1 tablet (81 mg total) by mouth daily  Qty: 30 tablet, Refills: 5    Associated Diagnoses: Cerebrovascular accident (CVA) due to embolism of left middle cerebral artery (HCC)      B Complex-C (B-COMPLEX WITH VITAMIN C) tablet Take 1 tablet by mouth daily      Calcium-Magnesium-Zinc 333-133-5 MG TABS Take 1 tablet by mouth daily      Cholecalciferol (VITAMIN D3) 5000 units TABS Take 5,000 Units by mouth daily      donepezil (ARICEPT) 10 mg tablet Take 1 tablet (10 mg total) by mouth daily at bedtime  Qty: 90 tablet, Refills: 1    Associated Diagnoses: Alzheimer's dementia without behavioral disturbance, unspecified timing of dementia onset (HCC)      gabapentin (NEURONTIN) 400 mg capsule Take 1 capsule (400 mg total) by mouth 2 (two) times a day  Qty: 180 capsule, Refills: 2    Associated Diagnoses: Seizure disorder ([address] )      ! ! levETIRAcetam (KEPPRA) 250 mg tablet Take 1 tablet (250 mg total) by mouth 2 (two) times a day With 2 500 mg Keppra pills twice daily  Qty: 60 tablet, Refills: 5    Associated Diagnoses: Partial symptomatic epilepsy with complex partial seizures, not intractable, without status epilepticus ([address] )      ! ! levETIRAcetam (KEPPRA) 500 mg tablet Take 2 tablets (1,000 mg total) by mouth 2 (two) times a day  Qty: 360 tablet, Refills: 2    Comments: Please dispense generic  Associated Diagnoses: Seizure disorder (HCC)      lovastatin (MEVACOR) 20 mg tablet Take 1 tablet (20 mg total) by mouth daily at bedtime  Qty: 90 tablet, Refills: 3    Associated Diagnoses: Hyperlipidemia, unspecified hyperlipidemia type      metoprolol tartrate (LOPRESSOR) 25 mg tablet Take 1 tablet (25 mg total) by mouth every 12 (twelve) hours Please dispense generic  Qty: 180 tablet, Refills: 1    Comments: Please dispense generic  Associated Diagnoses: Essential hypertension      Multiple Vitamins-Minerals ([employer] SPECTRAVITE ADULT GUMMIES PO) Take 1 each by mouth daily      vitamin B-12 (VITAMIN B-12) 1,000 mcg tablet Take 1,000 mcg by mouth daily       ! ! - Potential duplicate medications found  Please discuss with provider  No discharge procedures on file      ED Provider  Electronically Signed by           Blane Salinas MD  10/03/19 2003

## 2019-10-03 NOTE — ASSESSMENT & PLAN NOTE
· Due to UTI  · Patient with leukocytosis and tachypnea    Also with organ dysfunction given that patient has acute renal failure  · Will continue IV antibiotics with ceftriaxone  · Follow up cultures  · Trend procalcitonin  · IV fluids as needed  · Lactate within normal limits

## 2019-10-04 ENCOUNTER — APPOINTMENT (INPATIENT)
Dept: ULTRASOUND IMAGING | Facility: HOSPITAL | Age: 84
DRG: 871 | End: 2019-10-04
Payer: MEDICARE

## 2019-10-04 ENCOUNTER — APPOINTMENT (INPATIENT)
Dept: CT IMAGING | Facility: HOSPITAL | Age: 84
DRG: 871 | End: 2019-10-04
Payer: MEDICARE

## 2019-10-04 PROBLEM — R77.8 ELEVATED TROPONIN I LEVEL: Status: ACTIVE | Noted: 2019-10-04

## 2019-10-04 LAB
ANION GAP SERPL CALCULATED.3IONS-SCNC: 5 MMOL/L (ref 4–13)
ANISOCYTOSIS BLD QL SMEAR: PRESENT
BASO STIPL BLD QL SMEAR: PRESENT
BASOPHILS # BLD AUTO: 0.1 THOUSANDS/ΜL (ref 0–0.1)
BASOPHILS NFR BLD AUTO: 1 % (ref 0–2)
BUN SERPL-MCNC: 23 MG/DL (ref 7–25)
CALCIUM SERPL-MCNC: 8.9 MG/DL (ref 8.6–10.5)
CHLORIDE SERPL-SCNC: 109 MMOL/L (ref 98–107)
CO2 SERPL-SCNC: 29 MMOL/L (ref 21–31)
CREAT SERPL-MCNC: 0.94 MG/DL (ref 0.6–1.2)
EOSINOPHIL # BLD AUTO: 0 THOUSAND/ΜL (ref 0–0.61)
EOSINOPHIL NFR BLD AUTO: 0 % (ref 0–5)
ERYTHROCYTE [DISTWIDTH] IN BLOOD BY AUTOMATED COUNT: 16.2 % (ref 11.5–14.5)
GFR SERPL CREATININE-BSD FRML MDRD: 56 ML/MIN/1.73SQ M
GLUCOSE SERPL-MCNC: 91 MG/DL (ref 65–99)
HCT VFR BLD AUTO: 31.3 % (ref 42–47)
HGB BLD-MCNC: 9.9 G/DL (ref 12–16)
HYPERCHROMIA BLD QL SMEAR: PRESENT
LYMPHOCYTES # BLD AUTO: 1.1 THOUSANDS/ΜL (ref 0.6–4.47)
LYMPHOCYTES NFR BLD AUTO: 11 % (ref 21–51)
MCH RBC QN AUTO: 22.1 PG (ref 26–34)
MCHC RBC AUTO-ENTMCNC: 31.6 G/DL (ref 31–37)
MCV RBC AUTO: 70 FL (ref 81–99)
MICROCYTES BLD QL AUTO: PRESENT
MONOCYTES # BLD AUTO: 0.9 THOUSAND/ΜL (ref 0.17–1.22)
MONOCYTES NFR BLD AUTO: 9 % (ref 2–12)
NEUTROPHILS # BLD AUTO: 8.2 THOUSANDS/ΜL (ref 1.4–6.5)
NEUTS SEG NFR BLD AUTO: 79 % (ref 42–75)
PLATELET # BLD AUTO: 201 THOUSANDS/UL (ref 149–390)
PLATELET BLD QL SMEAR: ADEQUATE
PMV BLD AUTO: 8.6 FL (ref 8.6–11.7)
POTASSIUM SERPL-SCNC: 3.8 MMOL/L (ref 3.5–5.5)
PROCALCITONIN SERPL-MCNC: 20.3 NG/ML
RBC # BLD AUTO: 4.47 MILLION/UL (ref 3.9–5.2)
RBC MORPH BLD: NORMAL
SCHISTOCYTES BLD QL SMEAR: PRESENT
SODIUM SERPL-SCNC: 143 MMOL/L (ref 134–143)
WBC # BLD AUTO: 10.4 THOUSAND/UL (ref 4.8–10.8)

## 2019-10-04 PROCEDURE — G8987 SELF CARE CURRENT STATUS: HCPCS

## 2019-10-04 PROCEDURE — 92610 EVALUATE SWALLOWING FUNCTION: CPT

## 2019-10-04 PROCEDURE — G8998 SWALLOW D/C STATUS: HCPCS

## 2019-10-04 PROCEDURE — 80048 BASIC METABOLIC PNL TOTAL CA: CPT | Performed by: INTERNAL MEDICINE

## 2019-10-04 PROCEDURE — G8979 MOBILITY GOAL STATUS: HCPCS

## 2019-10-04 PROCEDURE — 71250 CT THORAX DX C-: CPT

## 2019-10-04 PROCEDURE — 85025 COMPLETE CBC W/AUTO DIFF WBC: CPT | Performed by: INTERNAL MEDICINE

## 2019-10-04 PROCEDURE — G8988 SELF CARE GOAL STATUS: HCPCS

## 2019-10-04 PROCEDURE — G8997 SWALLOW GOAL STATUS: HCPCS

## 2019-10-04 PROCEDURE — G8978 MOBILITY CURRENT STATUS: HCPCS

## 2019-10-04 PROCEDURE — 76705 ECHO EXAM OF ABDOMEN: CPT

## 2019-10-04 PROCEDURE — 84145 PROCALCITONIN (PCT): CPT | Performed by: INTERNAL MEDICINE

## 2019-10-04 PROCEDURE — 97530 THERAPEUTIC ACTIVITIES: CPT

## 2019-10-04 PROCEDURE — G8996 SWALLOW CURRENT STATUS: HCPCS

## 2019-10-04 PROCEDURE — 97163 PT EVAL HIGH COMPLEX 45 MIN: CPT

## 2019-10-04 PROCEDURE — 97167 OT EVAL HIGH COMPLEX 60 MIN: CPT

## 2019-10-04 PROCEDURE — 99232 SBSQ HOSP IP/OBS MODERATE 35: CPT | Performed by: NURSE PRACTITIONER

## 2019-10-04 RX ORDER — SODIUM CHLORIDE 9 MG/ML
75 INJECTION, SOLUTION INTRAVENOUS CONTINUOUS
Status: DISPENSED | OUTPATIENT
Start: 2019-10-04 | End: 2019-10-04

## 2019-10-04 RX ADMIN — ASPIRIN 81 MG 81 MG: 81 TABLET ORAL at 09:16

## 2019-10-04 RX ADMIN — LEVETIRACETAM 1000 MG: 500 TABLET, FILM COATED ORAL at 09:16

## 2019-10-04 RX ADMIN — HEPARIN SODIUM 5000 UNITS: 5000 INJECTION, SOLUTION INTRAVENOUS; SUBCUTANEOUS at 09:17

## 2019-10-04 RX ADMIN — GABAPENTIN 400 MG: 100 CAPSULE ORAL at 09:16

## 2019-10-04 RX ADMIN — PRAVASTATIN SODIUM 20 MG: 20 TABLET ORAL at 17:26

## 2019-10-04 RX ADMIN — METOPROLOL TARTRATE 25 MG: 25 TABLET ORAL at 01:02

## 2019-10-04 RX ADMIN — METOPROLOL TARTRATE 25 MG: 25 TABLET ORAL at 22:00

## 2019-10-04 RX ADMIN — LEVETIRACETAM 250 MG: 250 TABLET ORAL at 09:17

## 2019-10-04 RX ADMIN — HEPARIN SODIUM 5000 UNITS: 5000 INJECTION, SOLUTION INTRAVENOUS; SUBCUTANEOUS at 22:00

## 2019-10-04 RX ADMIN — SODIUM CHLORIDE 75 ML/HR: 9 INJECTION, SOLUTION INTRAVENOUS at 09:17

## 2019-10-04 RX ADMIN — GABAPENTIN 400 MG: 100 CAPSULE ORAL at 17:26

## 2019-10-04 RX ADMIN — LEVETIRACETAM 250 MG: 250 TABLET ORAL at 17:26

## 2019-10-04 RX ADMIN — METOPROLOL TARTRATE 25 MG: 25 TABLET ORAL at 09:17

## 2019-10-04 RX ADMIN — DONEPEZIL HYDROCHLORIDE 10 MG: 5 TABLET ORAL at 22:10

## 2019-10-04 RX ADMIN — LEVETIRACETAM 1000 MG: 500 TABLET, FILM COATED ORAL at 17:26

## 2019-10-04 RX ADMIN — CEFTRIAXONE 1000 MG: 1 INJECTION, SOLUTION INTRAVENOUS at 17:26

## 2019-10-04 NOTE — PLAN OF CARE
Problem: DISCHARGE PLANNING - CARE MANAGEMENT  Goal: Discharge to post-acute care or home with appropriate resources  Description  INTERVENTIONS:  - Conduct assessment to determine patient/family and health care team treatment goals, and need for post-acute services based on payer coverage, community resources, and patient preferences, and barriers to discharge  - Address psychosocial, clinical, and financial barriers to discharge as identified in assessment in conjunction with the patient/family and health care team  - Arrange appropriate level of post-acute services according to patient's   needs and preference and payer coverage in collaboration with the physician and health care team  - Communicate with and update the patient/family, physician, and health care team regarding progress on the discharge plan  - Arrange appropriate transportation to post-acute venues  Discharge home with live in  and continue ADC 2x/week  Daughter will transport home     Outcome: Progressing

## 2019-10-04 NOTE — PHYSICAL THERAPY NOTE
Physical Therapy Evaluation     Patient's Name: Corie Butler    Admitting Diagnosis  UTI (urinary tract infection) [N39 0]  Altered mental status [R41 82]  REYNALDO (acute kidney injury) ([facility] [address] ) [N17 9]  Altered mental status, unspecified altered mental status type [R41 82]    Problem List  Patient Active Problem List   Diagnosis    Seizure disorder ([facility] )    Essential hypertension    Hyperlipidemia    Dementia without behavioral disturbance ([facility] [address] )    Partial symptomatic epilepsy with complex partial seizures, not intractable, without status epilepticus ([facility] [facility] )    Severe sepsis ([facility] )    Toxic metabolic encephalopathy    Acute renal failure (ARF) ([facility] [address] )    Elevated troponin I level       Past Medical History  Past Medical History:   Diagnosis Date    Acute renal failure (ARF) ([facility] [address] ) 10/3/2019    Dementia ([facility] [address] )     17 years ago     [facility]     Essential hypertension 5/17/2019    Hypercholesterolemia     Stroke [facility])        Past Surgical History  Past Surgical History:   Procedure Laterality Date    CATARACT EXTRACTION, BILATERAL      3-4 years ago 8/2/2019    HYSTERECTOMY      KNEE SURGERY          10/04/19 1945   Note Type   Note type Eval/Treat   Pain Assessment   Pain Assessment FLACC   Pain Rating: FLACC (Rest) - Face 0   Pain Rating: FLACC (Rest) - Legs 0   Pain Rating: FLACC (Rest) - Activity 0   Pain Rating: FLACC (Rest) - Cry 0   Pain Rating: FLACC (Rest) - Consolability 0   Score: FLACC (Rest) 0   Pain Rating: FLACC (Activity) - Face 0   Pain Rating: FLACC (Activity) - Legs 0   Pain Rating: FLACC (Activity) - Activity 0   Pain Rating: FLACC (Activity) - Cry 0   Pain Rating: FLACC (Activity) - Consolability 0   Score: FLACC (Activity) 0   Home Living   Type of Home House   Additional Comments Additional home environment could not be obtained 2/2 pt 's decreased engagement and lethargy  Prior Function   Level of [place] Needs assistance with IADLs; Needs assistance with ADLs and functional mobility   Comments As per chart review, pt  attends adult day program In addition, pt 's nurse Mary spoke w/ patient's daughter who reports Ricardo Lagos has 24 hr in home aide  Restrictions/Precautions   Weight Bearing Precautions Per Order No   Other Precautions Cognitive; Bed Alarm; Fall Risk   General   Family/Caregiver Present No   Cognition   Overall Cognitive Status Impaired   Arousal/Participation Lethargic   Orientation Level Oriented to person   Memory Unable to assess   Following Commands Follows one step commands inconsistently   RUE Assessment   RUE Assessment   (please see OT assessment)   LUE Assessment   LUE Assessment   (please see OT assessment)   RLE Assessment   RLE Assessment X   Strength RLE   R Hip Flexion 3+/5   R Knee Extension 3+/5   R Ankle Dorsiflexion 3+/5   LLE Assessment   LLE Assessment X   Strength LLE   L Hip Flexion 3+/5   L Knee Extension 3+/5   L Ankle Dorsiflexion 3+/5   Coordination   Movements are Fluid and Coordinated 0   Coordination and Movement Description Incremental mobility w/o a clear intention requiring consistent verbal and tactile cues of 1  Bed Mobility   Rolling R 5  Supervision   Additional items Assist x 1;Bedrails;Verbal cues   Rolling L 5  Supervision   Additional items Assist x 1;Verbal cues; Bedrails   Supine to Sit   (CGA)   Additional items Assist x 1;Bedrails;Verbal cues; Impulsive   Sit to Supine   (CGA)   Additional items Assist x 1;Bedrails;Verbal cues; Impulsive   Transfers   Sit to Stand Unable to assess  (2/2 safety concerns w/ pt 's lethargy, decreased engagement)   Balance   Static Sitting Fair -   Dynamic Sitting Fair -   Static Standing   (unable to assess)   Endurance Deficit   Endurance Deficit Yes   Activity Tolerance   Activity Tolerance Treatment limited secondary to medical complications (Comment)  (lethargy, decreased initiation & engagement)   Nurse Made Aware yes, Dinorah Covarrubias PCA   Assessment   Prognosis Fair   Problem List Decreased strength;Decreased endurance; Impaired balance;Decreased mobility; Decreased cognition; Impaired judgement;Decreased safety awareness   Assessment Pt is 81 y o  female seen for PT evaluation s/p admit to [address] on 10/3/2019 w/ Severe sepsis ([address] )  PT consulted to assess pt's functional mobility and d/c needs  Order placed for PT eval and tx, w/ activity as tolerated order  Comorbidities affecting pt's physical performance at time of assessment include: lethargy w/severe sepsis, seizure d/o, HTN, dementia, toxic metabolic encephalopathy, ARF, UTI w/ altered mental status  PTA, pt was living w/24 hr caregiver and attended adult day program  Personal factors affecting pt at time of IE include: communication issues, decreased cognition, decreased initiation and engagement, impulsivity, unable to perform physical activity and limited insight into impairments  Please find objective findings from PT assessment regarding body systems outlined above with impairments and limitations including weakness, impaired balance, decreased endurance, impaired coordination, decreased activity tolerance, decreased functional mobility tolerance, decreased safety awareness, impaired judgement, fall risk and decreased cognition  The following objective measures performed on IE also reveal limitations: Barthel Index: 35/100  Pt's clinical presentation is currently unstable/unpredictable  Pt to benefit from continued PT tx to address deficits as defined above and maximize level of functional independent mobility and consistency  From PT/mobility standpoint, recommendation at time of d/c would be HHPT w/ 24 hr caregiver,family support pending progress in order to facilitate return to PLOF  Barriers to Discharge Other (Comment)   Barriers to Discharge Comments Pt 's inability to provide information, decreased engagement     Goals   Patient Goals unknown 2/2 pt 's lethargy   LTG Expiration Date 10/14/19   Long Term Goal #1 1 )Patient will complete bed mobility SBA  for decrease need for caregiver assistance, decrease burden of care  2 ) Patient will complete transfers SBA to decrease risk of falls, facilitate upright standing posture  3 ) BLE strength to greater than/equal to 4-/5 gross musculature to increase ability to safely transfer, control descent to chair  4 ) Patient will exhibit increase static standing balance to Fair , for 30-45 seconds without LOB and supervision of 1 to improve activity tolerance & endurance  5 ) Patient will exhibit increase dynamic ambulatory balance to Fair for 100-200 feet w/ supervision of 1 to improve ability to mobilize to toilet, chair and decrease risk for additional medical complications  6 ) Patient will exhibit good self monitoring and ability to follow 1 step commands to increase complexity of tasks and resume ADL's without LOB  PT Treatment Day 1   Plan   Treatment/Interventions Functional transfer training;LE strengthening/ROM; Therapeutic exercise; Endurance training;Cognitive reorientation;Equipment eval/education; Bed mobility;Gait training;Spoke to nursing;OT   PT Frequency Other (Comment)  (3-5x/wk)   Recommendation   Recommendation Home with family support;24 hour supervision/assist  (maintain prior caregivers)   PT - OK to Discharge No   Additional Comments Upon conclusion, pt  was resting in bed w/all needs within reach and nursing staff informed of assessment outcome  Barthel Index   Feeding 5   Bathing 0   Grooming Score 0   Dressing Score 5   Bladder Score 5   Bowels Score 5   Toilet Use Score 5   Transfers (Bed/Chair) Score 10   Mobility (Level Surface) Score 0   Stairs Score 0   Barthel Index Score 35     Additional skilled interventions: Therapeutic Activity x 10 minutes    S: Pt oriented to person alone, could not answer pertinent prior living questions w/reiterated verbal cues      O: therapeutic activity x 10 minutes including  bed mobility w/rolling B x 3 trials, supine<->sit, static/dynamic sitting balance w/ postural facilitation,and continued safety training 2/2 decreased engagement and impulsivity  A: Patient exhibited  weakness, impaired balance, , decreased endurance, activity intolerance,and decreased engagement w/impaired safety awareness to benefit from continued interventions  P: Continue PT tx with progression of functional tasks as patient can safely tolerate, 3-5x/week      Radha Alarcon, PT

## 2019-10-04 NOTE — OCCUPATIONAL THERAPY NOTE
Occupational Therapy Evaluation      Tiffany Dais    10/4/2019    Patient Active Problem List   Diagnosis    [facility] [address] [facility]    Essential hypertension    Hyperlipidemia    Dementia without behavioral disturbance ([facility] [address] )    Partial symptomatic epilepsy with complex partial seizures, not intractable, without status epilepticus ([facility] [address] )    Severe sepsis ([facility] )    Toxic metabolic encephalopathy    Acute renal failure (ARF) ([facility] [address] )    Elevated troponin I level       Past Medical History:   Diagnosis Date    [facility]    [facility]     17 years ago     [facility]     Essential hypertension 5/17/2019    Hypercholesterolemia     Stroke [facility])        Past Surgical History:   Procedure Laterality Date    CATARACT EXTRACTION, BILATERAL      3-4 years ago 8/2/2019    HYSTERECTOMY      KNEE SURGERY          10/04/19 0837   Note Type   Note type Eval only   Restrictions/Precautions   Weight Bearing Precautions Per Order No   Other Precautions Cognitive; Bed Alarm; Fall Risk   Pain Assessment   Pain Assessment FLACC   Pain Rating: FLACC (Rest) - Face 0   Pain Rating: FLACC (Rest) - Legs 0   Pain Rating: FLACC (Rest) - Activity 0   Pain Rating: FLACC (Rest) - Cry 0   Pain Rating: FLACC (Rest) - Consolability 0   Score: FLACC (Rest) 0   Pain Rating: FLACC (Activity) - Face 0   Pain Rating: FLACC (Activity) - Legs 0   Pain Rating: FLACC (Activity) - Activity 0   Pain Rating: FLACC (Activity) - Cry 0   Pain Rating: FLACC (Activity) - Consolability 0   Score: FLACC (Activity) 0   Home Living   Type of Home House   Home Equipment Walker   Additional Comments Additional home environment could not be obtained 2/2 pt 's decreased engagement and lethargy  Prior Function   Level of [place] Needs assistance with IADLs; Needs assistance with ADLs and functional mobility   Falls in the last 6 months 0  (per pt )   Comments As per chart review, pt  attends adult day program In addition, pt 's nurse Mary spoke w/ patient's daughter who reports Armen Rahman has 24 hr in home aide  Lifestyle   Autonomy Per chart review, patient lives at home with a 24/7 aide and requires assist with ADLs/IADLs  ADL   Eating Assistance 5  Supervision/Setup   Grooming Assistance 4  Minimal Assistance   UB Bathing Assistance 4  Minimal Assistance   LB Bathing Assistance 3  Moderate Assistance   UB Dressing Assistance 4  Minimal Assistance   LB Dressing Assistance 3  Moderate [address]  4  Minimal Assistance   Bed Mobility   Rolling R 5  Supervision   Additional items Assist x 1;Bedrails; Increased time required;Verbal cues   Rolling L 5  Supervision   Additional items Assist x 1;Verbal cues; Bedrails   Supine to Sit   (CGA)   Additional items Assist x 1;Bedrails; Increased time required;Verbal cues   Sit to Supine   (CGA)   Additional items Assist x 1;Bedrails; Increased time required;Verbal cues   Functional Mobility   Additional Comments further functional mobility was deferred secondary pt's decreased engagment and safety concerns   Balance   Static Sitting Fair -   Dynamic Sitting Fair -   Activity Tolerance   Activity Tolerance Treatment limited secondary to medical complications (Comment)  (lethargy, decreased initiation & engagement)   Nurse Made Aware CHELY Isaac made aware of outcomes    RUE Assessment   RUE Assessment WFL  (full AROM noted, MMT declined at this time)   LUE Assessment   LUE Assessment WFL  (full AROM noted, MMT declined at this time)   Hand Function   Gross Motor Coordination Functional   Fine Motor Coordination Functional   Cognition   Overall Cognitive Status Impaired   Arousal/Participation Arousable;Persistent stimuli required   Orientation Level Oriented to person   Memory Unable to assess   Following Commands Follows one step commands inconsistently   Assessment   Limitation Decreased ADL status; Decreased UE strength;Decreased cognition;Decreased endurance;Decreased self-care trans;Decreased high-level ADLs   Prognosis Fair   Assessment Pt is a 81 y o  female who was admitted to [address] [place] [address] on 10/3/2019 with Severe sepsis ([facility] [address] )  At this time, patient is also affected by the comorbidities of: seizure disorder, HTN, dementia, toxic metabolic encephalopathy, acute renal failure and elevated troponin level  Additionally, patient is affected by the following personal factors:difficulty performing ADLS, difficulty performing IADLS  and decreased initiation and engagement   Orders placed for OT evaluation/treatment with up with assistance orders  Prior to admission, Per chart review, patient lives at home with a 24/7 aide and requires assist with ADLs/IADLs  Upon OT evaluation, patient requires minimum assist for UB ADLs and moderate assist for LB ADLs  Occupational performance is affected by the following deficits: weakness, decreased balance, decreased tolerance, impaired arousal, impaired attention, impaired initiation, impaired memory, impaired sequencing and impaired problem solving  Based on the following information, patient would benefit from continued skilled OT treatment while in the hospital to address deficits and maximize level of functional independence with ADL's and functional mobility  Occupational Performance areas to address include: grooming, bathing/shower, toilet hygiene, dressing, functional mobility and clothing management  From OT standpoint, recommendation at time of d/c would be home with 24 hour supervision/assist    Goals   Patient Goals no goals stated at this time due to pt's decreased cognition    Plan   Treatment Interventions ADL retraining;Functional transfer training;UE strengthening/ROM; Endurance training; Compensatory technique education   Goal Expiration Date 10/14/19   OT Treatment Day 0   OT Frequency 3-5x/wk   Recommendation   OT Discharge Recommendation 24 hour supervision/assist   OT - OK to Discharge Yes  (Once medically cleared)   Barthel Index   Feeding 5   Bathing 0   Grooming Score 0   Dressing Score 5   Bladder Score 5   Bowels Score 5   Toilet Use Score 5   Transfers (Bed/Chair) Score 10   Mobility (Level Surface) Score 0   Stairs Score 0   Barthel Index Score 35     GOALS:    *ADL transfers with (I) for inc'd independence with ADLs/purposeful tasks    *UB ADL with (I) for inc'd independence with self cares    *LB ADL with (S) using AE prn for inc'd independence with self cares    *Toileting with (S) for clothing management and hygiene for return to PLOF with personal care    *Increase static stand balance to good and dyn stand balance to fair+ for inc'd safety with standing purposeful tasks    *Increase stand tolerance x8 m for inc'd tolerance with standing purposeful tasks    *Participate in 10m UE therex to increase overall stamina/activity tolerance for purposeful tasks    *Bed mobility- (I) for inc'd independence to manage own comfort and initiate EOB & OOB purposeful task    *Patient will increase OOB/sitting tolerance to 2-4 hours per day to increase participation in self-care and leisure tasks with no s/s of exertion           Shea Galvan MS, OTR/L

## 2019-10-04 NOTE — PROGRESS NOTES
Progress Note Carolina Shallow 1935, 81 y o  female MRN: [id number]    Unit/Bed#: -02 Encounter: [id number]    Primary Care Provider: Ella Vazquez MD   Date and time admitted to hospital: 10/3/2019 12:18 PM        * Severe sepsis [facility])  Assessment & Plan  · Due to UTI  · Patient with leukocytosis and tachypnea  Also with organ dysfunction given that patient has acute renal failure  · Will continue IV antibiotics with ceftriaxone day#2  · Urine culture- pending   · Blood cultures- pending   · Trend procalcitonin  · IV fluids for more 12 hours   · Lactate within normal limits    Acute renal failure (ARF) (HCC)  Assessment & Plan  · Likely secondary to dehydration and sepsis  · Renal function is much improved   · Continue IV fluids   · Renal ultrasound result appreciated  · Monitor creatinine, if worsening will consider Nephrology consultation    Toxic metabolic encephalopathy  Assessment & Plan  · CT head with no acute findings  · Likely secondary to sepsis  · Continue supportive care      Dementia without behavioral disturbance (HCC)  Assessment & Plan  · Continue Aricept  · Supportive care      Essential hypertension  Assessment & Plan  · BP stable  · Continue home meds  · Will monitor     Seizure disorder ([facility] [address] )  Assessment & Plan  · Continue Keppra  · Seizure precautions         VTE Pharmacologic Prophylaxis: Pharmacologic: Heparin    Patient Centered Rounds: I have performed bedside rounds with nursing staff today  Discussions with Specialists or Other Care Team Provider: nursing, CM, OT/PT, Rx  Education and Discussions with Family / Patient: patient and daughter     Current Length of Stay: 1 day(s)    Current Patient Status: Inpatient   Certification Statement: The patient will continue to require additional inpatient hospital stay due to severe sepsis    Discharge Plan: pending hospital course     Code Status: Level 3 - DNAR and DNI    Subjective:   Patient offers no complaints   She was a bit annoyed as I woke her up for examination this AM      Objective:     Vitals:   Temp (24hrs), Av 6 °F (37 °C), Min:97 5 °F (36 4 °C), Max:100 1 °F (37 8 °C)    Temp:  [97 5 °F (36 4 °C)-100 1 °F (37 8 °C)] 98 3 °F (36 8 °C)  HR:  [57-85] 77  Resp:  [16-20] 18  BP: (117-170)/(57-74) 151/63  SpO2:  [92 %-100 %] 100 %  Body mass index is 24 2 kg/m²  Input and Output Summary (last 24 hours):     No intake or output data in the 24 hours ending 10/04/19 0915    Physical Exam:     Physical Exam   Constitutional: She appears well-developed  No distress  Frail      HENT:   Head: Normocephalic and atraumatic  Mouth/Throat: Oropharynx is clear and moist    Eyes: Pupils are equal, round, and reactive to light  Conjunctivae and EOM are normal    Neck: Normal range of motion  Neck supple  No thyromegaly present  Cardiovascular: Normal rate, regular rhythm and normal heart sounds  Exam reveals no gallop and no friction rub  No murmur heard  Pulmonary/Chest: Effort normal and breath sounds normal  She has no wheezes  She has no rales  Abdominal: Soft  Bowel sounds are normal  She exhibits no distension  There is no tenderness  There is no rebound  Musculoskeletal: Normal range of motion  She exhibits no edema, tenderness or deformity  Neurological: She is alert  No cranial nerve deficit  With baseline dementia   Skin: Skin is warm and dry  No rash noted  No erythema  Psychiatric: She has a normal mood and affect  Her behavior is normal    Vitals reviewed        Additional Data:     Labs:    Results from last 7 days   Lab Units 10/03/19  1239   WBC Thousand/uL 23 10*   HEMOGLOBIN g/dL 11 4*   HEMATOCRIT % 36 1*   PLATELETS Thousands/uL 253   LYMPHO PCT % 8*   MONO PCT % 4     Results from last 7 days   Lab Units 10/04/19  0628 10/03/19  1239   POTASSIUM mmol/L 3 8 4 8   CHLORIDE mmol/L 109* 104   CO2 mmol/L 29 26   BUN mg/dL 23 26*   CREATININE mg/dL 0 94 1 53*   CALCIUM mg/dL 8 9 9 7   ALK PHOS U/L  --  89 ALT U/L  --  19   AST U/L  --  40*     Results from last 7 days   Lab Units 10/03/19  1239   INR  1 15               * I Have Reviewed All Lab Data Listed Above  * Additional Pertinent Lab Tests Reviewed: Erica 66 Admission  Reviewed    Imaging:  Imaging Reports Reviewed Today Include: cxr    Recent Cultures (last 7 days):     Results from last 7 days   Lab Units 10/03/19  1254   LEGIONELLA URINARY ANTIGEN  Negative       Last 24 Hours Medication List:     Current Facility-Administered Medications:  acetaminophen 650 mg Oral Q6H PRN Emmett Bingham MD   aspirin 81 mg Oral Daily Emmett Bingham MD   cefTRIAXone 1,000 mg Intravenous Q24H Emmett Bingham MD   donepezil 10 mg Oral HS Emmett Bingham MD   gabapentin 400 mg Oral BID Emmett Bingham MD   heparin (porcine) 5,000 Units Subcutaneous Q12H Alma Argueta MD   levETIRAcetam 1,000 mg Oral BID Emmett Bingham MD   levETIRAcetam 250 mg Oral BID Emmett Bingham MD   metoprolol tartrate 25 mg Oral Q12H Alma Argueta MD   ondansetron 4 mg Intravenous Q6H PRN Emmett Bingham MD   pravastatin 20 mg Oral Daily With Enedelia De Los Santos MD   sodium chloride 75 mL/hr Intravenous Continuous MATEUSZ Leo        Today, Patient Was Seen By: MATEUSZ Leo    ** Please Note: Dictation voice to text software may have been used in the creation of this document   **

## 2019-10-04 NOTE — PLAN OF CARE
Problem: PHYSICAL THERAPY ADULT  Goal: Performs mobility at highest level of function for planned discharge setting  See evaluation for individualized goals  Description  Treatment/Interventions: Functional transfer training, LE strengthening/ROM, Therapeutic exercise, Endurance training, Cognitive reorientation, Equipment eval/education, Bed mobility, Gait training, Spoke to nursing, OT     See flowsheet documentation for full assessment, interventions and recommendations  Yudelka Tillman, PT     Note:   Prognosis: Fair  Problem List: Decreased strength, Decreased endurance, Impaired balance, Decreased mobility, Decreased cognition, Impaired judgement, Decreased safety awareness  Assessment: Pt is 81 y o  female seen for PT evaluation s/p admit to [address] [place] [address] on 10/3/2019 w/ Severe sepsis ([facility] [address] )  PT consulted to assess pt's functional mobility and d/c needs  Order placed for PT eval and tx, w/ activity as tolerated order  Comorbidities affecting pt's physical performance at time of assessment include: lethargy w/severe sepsis, seizure d/o, HTN, dementia, toxic metabolic encephalopathy, ARF, UTI w/ altered mental status  PTA, pt was living w/24 hr caregiver and attended adult day program  Personal factors affecting pt at time of IE include: communication issues, decreased cognition, decreased initiation and engagement, impulsivity, unable to perform physical activity and limited insight into impairments  Please find objective findings from PT assessment regarding body systems outlined above with impairments and limitations including weakness, impaired balance, decreased endurance, impaired coordination, decreased activity tolerance, decreased functional mobility tolerance, decreased safety awareness, impaired judgement, fall risk and decreased cognition  The following objective measures performed on IE also reveal limitations: Barthel Index: 35/100   Pt's clinical presentation is currently unstable/unpredictable  Pt to benefit from continued PT tx to address deficits as defined above and maximize level of functional independent mobility and consistency  From PT/mobility standpoint, recommendation at time of d/c would be HHPT w/ 24 hr caregiver,family support pending progress in order to facilitate return to PLOF  Barriers to Discharge: Other (Comment)  Barriers to Discharge Comments: Pt 's inability to provide information, decreased engagement  Recommendation: Home with family support, 24 hour supervision/assist(maintain prior caregivers)     PT - OK to Discharge: No    See flowsheet documentation for full assessment     Abby Grove, PT

## 2019-10-04 NOTE — SOCIAL WORK
Attempted to evaluate the pt at the bedside  Pt was sent to the ER from her day care program   Pt has a history of dementia and was unable to answer any questions appropriately  TC to pt daughter Humphreybk Valadezcarmelo and left a VM

## 2019-10-04 NOTE — SOCIAL WORK
Spoke to the daughter and the bedside with the live-in CG present  Daughter reports the pt had moved from [place] to [place] in the last year the the CG has come also  Pt has a 24 hr live in CG through the waiver program   CG provides all ADL and IADl services  Daughter does the grocery shopping and takes pt to her MD appointments  Pt does feed herself  Pt has a walker, WC, BSC and shower chair at home  Pt attends the [facility] on Tues and Thurs  Pt is transported via [facility] to [address]  Pt gets her medications at [facility]  Daughter states she will transport pt home upon discharge  Patient/caregiver received discharge checklist   Content reviewed  Patient/caregiver encouraged to participate in discharge plan of care prior to discharge home  CM reviewed d/c planning process including the following: identifying help at home, patient preference for d/c planning needs, availability of treatment team to discuss questions or concerns patient and/or family may have regarding understanding medications and recognizing signs and symptoms once discharged  CM also encouraged patient to follow up with all recommended appointments after discharge  Patient advised of importance for patient and family to participate in managing patients medical well being

## 2019-10-04 NOTE — ASSESSMENT & PLAN NOTE
· Likely secondary to dehydration and sepsis  · Renal function is much improved   · Continue IV fluids   · Renal ultrasound result appreciated  · Monitor creatinine, if worsening will consider Nephrology consultation

## 2019-10-04 NOTE — ASSESSMENT & PLAN NOTE
· Likely due to acute infection/severe sepsis   · Asymptomatic   · Troponin levels 0 05, 0 04- no true rising or falling   · EKG shows no ischemic changes

## 2019-10-04 NOTE — UTILIZATION REVIEW
Initial Clinical Review    Admission: Date/Time/Statement: Inpatient Admission Orders (From admission, onward)     Ordered        10/03/19 1429  Inpatient Admission (expected length of stay for this patient Order details is greater than two midnights)  Once                   Orders Placed This Encounter   Procedures    Inpatient Admission (expected length of stay for this patient Order details is greater than two midnights)     Standing Status:   Standing     Number of Occurrences:   1     Order Specific Question:   Admitting Physician     Answer:   Echo Cotton     Order Specific Question:   Level of Care     Answer:   Med Surg [16]     Order Specific Question:   Estimated length of stay     Answer:   More than 2 Midnights     Order Specific Question:   Certification     Answer:   I certify that inpatient services are medically necessary for this patient for a duration of greater than two midnights  See H&P and MD Progress Notes for additional information about the patient's course of treatment  ED Arrival Information     Expected Arrival Acuity Means of Arrival Escorted By Service Admission Type    - 10/3/2019 12:14 Urgent Ambulance [address] [facility] General Medicine Urgent    Arrival Complaint    altered mental status        Chief Complaint   Patient presents with    Altered Mental Status     patient had an episode of altered mental status while at adult      Assessment/Plan: 80 y/o female presents to ED from home by EMS with increased confusion from baseline, altered mental status  Admitted as inpatient  Severe sepsis [facility])  Assessment & Plan  · Due to UTI  · Patient with leukocytosis and tachypnea    Also with organ dysfunction given that patient has acute renal failure  · Will continue IV antibiotics with ceftriaxone  · Follow up cultures  · Trend procalcitonin  · IV fluids as needed  · Lactate within normal limits  Toxic metabolic encephalopathy  Assessment & Plan  · CT head with no acute findings  · Likely secondary to sepsis  · Continue supportive care  Acute renal failure (ARF) (HCC)  Assessment & Plan  · Likely secondary to dehydration and sepsis  · Continue IV fluids  · Renal ultrasound result appreciated  · Monitor creatinine, if worsening will consider Nephrology consultation      ED Triage Vitals [10/03/19 1220]   Temperature Pulse Respirations Blood Pressure SpO2   98 6 °F (37 °C) 76 16 124/59 98 %      Temp Source Heart Rate Source Patient Position - Orthostatic VS BP Location FiO2 (%)   Temporal Monitor Sitting Right arm --      Pain Score       6        Wt Readings from Last 1 Encounters:   10/04/19 68 kg (149 lb 14 6 oz)     Additional Vital Signs:   10/04/19 1440  98 2 °F (36 8 °C)  70  18  126/60  98 %  None (Room air)  Sitting   10/04/19 0734  98 3 °F (36 8 °C)  77  18  151/63  100 %  None (Room air)  Lying   10/04/19 0306  100 1 °F (37 8 °C)  75  18  162/66  100 %  None (Room air)  Lying   10/03/19 2352  98 4 °F (36 9 °C)  85  18  170/74  98 %  None (Room air)         Pertinent Labs/Diagnostic Test Results:   Results from last 7 days   Lab Units 10/04/19  0628 10/03/19  1239   WBC Thousand/uL 10 40 23 10*   HEMOGLOBIN g/dL 9 9* 11 4*   HEMATOCRIT % 31 3* 36 1*   PLATELETS Thousands/uL 201 253   NEUTROS ABS Thousands/µL 8 20*  --    TOTAL NEUT ABS Thousand/uL  --  19 87*   BANDS PCT %  --  11*         Results from last 7 days   Lab Units 10/04/19  0628 10/03/19  1239   SODIUM mmol/L 143 140   POTASSIUM mmol/L 3 8 4 8   CHLORIDE mmol/L 109* 104   CO2 mmol/L 29 26   ANION GAP mmol/L 5 10   BUN mg/dL 23 26*   CREATININE mg/dL 0 94 1 53*   EGFR ml/min/1 73sq m 56 31   CALCIUM mg/dL 8 9 9 7     Results from last 7 days   Lab Units 10/03/19  1239   AST U/L 40*   ALT U/L 19   ALK PHOS U/L 89   TOTAL PROTEIN g/dL 7 1   ALBUMIN g/dL 4 2   TOTAL BILIRUBIN mg/dL 0 80         Results from last 7 days   Lab Units 10/04/19  0628 10/03/19  1239   GLUCOSE RANDOM mg/dL 91 116*             No results found for: BETA-HYDROXYBUTYRATE                   Results from last 7 days   Lab Units 10/03/19  1749 10/03/19  1239   TROPONIN I ng/mL 0 04* 0 06*         Results from last 7 days   Lab Units 10/03/19  1239   PROTIME seconds 13 4*   INR  1 15   PTT seconds 30         Results from last 7 days   Lab Units 10/04/19  0628   PROCALCITONIN ng/ml 20 30*     Results from last 7 days   Lab Units 10/03/19  1433   LACTIC ACID mmol/L 1 5                                     Results from last 7 days   Lab Units 10/03/19  1254   CLARITY UA  Cloudy*   COLOR UA  Yellow   SPEC GRAV UA  1 020   PH UA  5 5   GLUCOSE UA mg/dl Negative   KETONES UA mg/dl Trace*   BLOOD UA  1+*   PROTEIN UA mg/dl 1+*   NITRITE UA  Positive*   BILIRUBIN UA  Negative   UROBILINOGEN UA E U /dl 0 2   LEUKOCYTES UA  Trace*   WBC UA /hpf 10-20*   RBC UA /hpf 4-10*   BACTERIA UA /hpf Innumerable*   EPITHELIAL CELLS WET PREP /hpf Moderate*   MUCUS THREADS  Occasional*     Results from last 7 days   Lab Units 10/03/19  1254   STREP PNEUMONIAE ANTIGEN, URINE  Negative   LEGIONELLA URINARY ANTIGEN  Negative         Results from last 7 days   Lab Units 10/03/19  1239   ETHANOL LVL mg/dL <10                 Results from last 7 days   Lab Units 10/03/19  1254   URINE CULTURE  >100,000 cfu/ml Gram Negative Lyle Enteric Like*       CXR:  No acute cardiopulmonary disease    Lateral right perihilar nodular density  Recommend CT  CT chest:  1   Mild pulmonary edema and scattered subsegmental atelectasis   No focal consolidations  2   2 0 x 2 8 cm indeterminate lesion within segment 7 of the liver, possibly representing complex cyst   Further evaluation with  right upper quadrant ultrasound recommended   If ultrasound is inconclusive, further evaluation with contrast-enhanced   cross-sectional imaging may be indicated      ED Treatment:   Medication Administration from 10/03/2019 1214 to 10/03/2019 1543       Date/Time Order Dose Route Action Action by Comments 10/03/2019 1304 sodium chloride 0 9 % bolus 1,000 mL 1,000 mL Intravenous [address] Salazar Salazar RN      10/03/2019 1439 cefTRIAXone (ROCEPHIN) IVPB (premix) 1,000 mg 1,000 mg Intravenous New Bag Kathy Cruz RN hard stick, blood culture time extended        Past Medical History:   Diagnosis Date    Acute renal failure (ARF) ([place] [address] ) 10/3/2019    Dementia ([place] [address] )     17 years ago     Epilepsy ([place] [address] )     Essential hypertension 5/17/2019    Hypercholesterolemia     Stroke [facility])      Present on Admission:   Seizure disorder ([place] [address] )   Essential hypertension   Dementia without behavioral disturbance ([place] [address] )      Admitting Diagnosis: UTI (urinary tract infection) [N39 0]  Altered mental status [R41 82]  REYNALDO (acute kidney injury) ([place] [address] ) [N17 9]  Altered mental status, unspecified altered mental status type [R41 82]  Age/Sex: 81 y o  female  Admission Orders:    Current Facility-Administered Medications:  acetaminophen 650 mg Oral Q6H PRN Pinky Denny MD    aspirin 81 mg Oral Daily Pinky Denny MD    cefTRIAXone 1,000 mg Intravenous Q24H Pinky Denny MD    donepezil 10 mg Oral HS Pinky Denny MD    gabapentin 400 mg Oral BID Pinky Denny MD    heparin (porcine) 5,000 Units Subcutaneous Q12H Frances Herrera MD    levETIRAcetam 1,000 mg Oral BID Pinky Denny MD    levETIRAcetam 250 mg Oral BID Pinky Denny MD    metoprolol tartrate 25 mg Oral Q12H [facility] Pinky Denny MD    ondansetron 4 mg Intravenous Q6H PRN Pinky Denny MD    pravastatin 20 mg Oral Daily With Diana Mack MD    sodium chloride 75 mL/hr Intravenous Continuous MATEUSZ Díaz Rate: 75 mL/hr (10/04/19 3524)       None    Network Utilization Review Department  Phone: [phone]; Fax [phone]  [email or web address]  ATTENTION: Please call with any questions or concerns to [phone]  and carefully listen to the prompts so that you are directed to the right person     Send all requests for admission clinical reviews, approved or denied determinations and any other requests to fax [phone]   All voicemails are confidential

## 2019-10-04 NOTE — SPEECH THERAPY NOTE
Speech Language/Pathology  Speech/Language Pathology Dysphagia Assessment    Patient Name: Dipesh Serrato  JAWQL'[id number] Date: 10/4/2019     Problem List  Principal Problem:    Severe sepsis [facility])  Active Problems:    Seizure disorder ([facility] [address] )    Essential hypertension    Dementia without behavioral disturbance ([facility] )    Toxic metabolic encephalopathy    Acute renal failure (ARF) ([facility])    Elevated troponin I level    Past Medical History  Past Medical History:   Diagnosis Date    Acute renal failure (ARF) ([facility] ) 10/3/2019    Dementia ([facility] )     17 years ago     Epilepsy ([facility] )     Essential hypertension 5/17/2019    Hypercholesterolemia     Stroke [facility])      Past Surgical History  Past Surgical History:   Procedure Laterality Date    CATARACT EXTRACTION, BILATERAL      3-4 years ago 8/2/2019    HYSTERECTOMY      KNEE SURGERY          Bedside Swallow Evaluation:    Summary:  Pt w/remote history of dysphagia (resolved) presents with functional oropharyngeal swallow  No overt difficulty or s/s aspiration observed  Recommendations:  Diet: regular  Liquid: thin  Meds: as desired/tolerated  Supervision: yes    Eval only, No f/u tx indicated  HPI:  From H&P:  Dipesh Serrato is a 81 y o  female who presents with altered mental status  Patient was at her  program this morning when she was noted to be excessively drowsy  Patient was noted to fall asleep during routine activities  This was unusual for patient, as she is typically awake during her day program   Per daughter at bedside patient was noted to be a little bit more fatigued and weak yesterday  No fever or chills  Patient was brought to the ER for further evaluation  In the ER CT head was performed which did not show any acute findings  Patient was found to have UTI and started on antibiotics  No recent hospitalizations  10/4 CT Chest:  IMPRESSION:  1  Mild pulmonary edema and scattered subsegmental atelectasis    No focal consolidations  2   2 0 x 2 8 cm indeterminate lesion within segment 7 of the liver, possibly representing complex cyst   Further evaluation with  right upper quadrant ultrasound recommended  If ultrasound is inconclusive, further evaluation with contrast-enhanced   cross-sectional imaging may be indicated  Reason for consult:  R/o aspiration  Determine safest and least restrictive diet  Change in mental status  Remote h/o dysphagia (per dtr, dysphagia resolved s/p CVA in 2001)    Current diet:  Regular with thin liquids  Premorbid diet[de-identified]  Regular with thin liquids  Previous VBS:  None here  O2 requirement:  Room air  Voice/Speech:  wfls  Social:  Home with CG/aid  Follows commands:  Most simple commands                       Cognitive Status:  Awake, alert, confused (baseline)  Oral mech exam:  Dentition: [facility]  Mild asymmetry at rest  Labial strength and ROM: [facility]  Lingual strength and ROM: pt would not participate  Mandibular strength and ROM: [facility]    Items administered:  Puree, hard solid, thin liquids  Liquids were taken by straw       Oral stage:  Lip closure: good  Mastication:effective  Bolus formation: good  Bolus control: good  Transfer: [facility]  Oral residue: no  Pocketing: no    Pharyngeal stage:  Swallow promptness: appeared timely  Laryngeal rise: adequate per palpation  Wet voice: no  Throat clear: no  Cough: no  Secondary swallows: no  Audible swallows: no  No overt s/s aspiration    Esophageal stage:  No s/s reported    Results d/w:  Pt,family

## 2019-10-04 NOTE — ASSESSMENT & PLAN NOTE
· Due to UTI  · Patient with leukocytosis and tachypnea    Also with organ dysfunction given that patient has acute renal failure  · Will continue IV antibiotics with ceftriaxone day#2  · Urine culture- pending   · Blood cultures- pending   · Trend procalcitonin  · IV fluids for more 12 hours   · Lactate within normal limits

## 2019-10-04 NOTE — NURSING NOTE
Patient pulled out her IV accidentally  Pressure applied  New IV placed by another RN  Patient tolerated well and resting comfortably

## 2019-10-04 NOTE — PLAN OF CARE
Problem: OCCUPATIONAL THERAPY ADULT  Goal: Performs self-care activities at highest level of function for planned discharge setting  See evaluation for individualized goals  Description  Treatment Interventions: ADL retraining, Functional transfer training, UE strengthening/ROM, Endurance training, Compensatory technique education          See flowsheet documentation for full assessment, interventions and recommendations  Note:   Limitation: Decreased ADL status, Decreased UE strength, Decreased cognition, Decreased endurance, Decreased self-care trans, Decreased high-level ADLs  Prognosis: Fair  Assessment: Pt is a 81 y o  female who was admitted to [address] [place] [address] on 10/3/2019 with Severe sepsis ([facility] [address] )  At this time, patient is also affected by the comorbidities of: seizure disorder, HTN, dementia, toxic metabolic encephalopathy, acute renal failure and elevated troponin level  Additionally, patient is affected by the following personal factors:difficulty performing ADLS, difficulty performing IADLS  and decreased initiation and engagement   Orders placed for OT evaluation/treatment with up with assistance orders  Prior to admission, Per chart review, patient lives at home with a 24/7 aide and requires assist with ADLs/IADLs  Upon OT evaluation, patient requires minimum assist for UB ADLs and moderate assist for LB ADLs  Occupational performance is affected by the following deficits: weakness, decreased balance, decreased tolerance, impaired arousal, impaired attention, impaired initiation, impaired memory, impaired sequencing and impaired problem solving  Based on the following information, patient would benefit from continued skilled OT treatment while in the hospital to address deficits and maximize level of functional independence with ADL's and functional mobility   Occupational Performance areas to address include: grooming, bathing/shower, toilet hygiene, dressing, functional mobility and clothing management   From OT standpoint, recommendation at time of d/c would be home with 24 hour supervision/assist      OT Discharge Recommendation: 24 hour supervision/assist  OT - OK to Discharge: Yes(Once medically cleared)     Tom Perez, OT

## 2019-10-04 NOTE — PHYSICIAN ADVISOR
Current patient class: Inpatient  The patient is currently on Hospital Day: 2 at [address]      The patient was admitted to the hospital at [phone] on 10/3/19 for the following diagnosis:  UTI (urinary tract infection) [N39 0]  Altered mental status [R41 82]  REYNALDO (acute kidney injury) ([facility] [address] ) [N17 9]  Altered mental status, unspecified altered mental status type [R41 82]       There is documentation in the medical record of an expected length of stay of at least 2 midnights  The patient is therefore expected to satisfy the 2 midnight benchmark and given the 2 midnight presumption is appropriate for INPATIENT ADMISSION  Given this expectation of a satisfying stay, CMS instructs us that the patient is most often appropriate for inpatient admission under part A provided medical necessity is documented in the chart  After review of the relevant documentation, labs, vital signs and test results, the patient is appropriate for INPATIENT ADMISSION  Admission to the hospital as an inpatient is a complex decision making process which requires the practitioner to consider the patients presenting complaint, history and physical examination and all relevant testing  With this in mind, in this case, the patient was deemed appropriate for INPATIENT ADMISSION  After review of the documentation and testing available at the time of the admission I concur with this clinical determination of medical necessity  Rationale is as follows: The patient is a 81 yrs old Female who presented to the ED at 10/3/2019 12:18 PM with a chief complaint of Altered Mental Status (patient had an episode of altered mental status while at adult )     The patient is being admitted with severe sepsis, REYNALDO, toxic metabolic encephalopathy  The plan of care includes IVF, repeat lactate levels, procalcitonin, IV abx, consideration of nephrology consultation   This patient is appropriate for INPATIENT admission; continued hospitalization is necessary to ensure stabilization of her clinical status       The patients vitals on arrival were ED Triage Vitals [10/03/19 1220]   Temperature Pulse Respirations Blood Pressure SpO2   98 6 °F (37 °C) 76 16 124/59 98 %      Temp Source Heart Rate Source Patient Position - Orthostatic VS BP Location FiO2 (%)   Temporal Monitor Sitting Right arm --      Pain Score       6           Past Medical History:   Diagnosis Date    Acute renal failure (ARF) ([facility] [address] ) 10/3/2019    Dementia ([facility] [address] )     17 years ago     Epilepsy ([facility] [address] )     Essential hypertension 5/17/2019    Hypercholesterolemia     Stroke [facility])      Past Surgical History:   Procedure Laterality Date    CATARACT EXTRACTION, BILATERAL      3-4 years ago 8/2/2019    HYSTERECTOMY      KNEE SURGERY             Consults have been placed to:   None    Vitals:    10/04/19 0306 10/04/19 0557 10/04/19 0734 10/04/19 1440   BP: 162/66  151/63 126/60   BP Location: Left arm  Left arm Left arm   Pulse: 75  77 70   Resp: 18  18 18   Temp: 100 1 °F (37 8 °C)  98 3 °F (36 8 °C) 98 2 °F (36 8 °C)   TempSrc: Temporal  Tympanic Tympanic   SpO2: 100%  100% 98%   Weight:  68 kg (149 lb 14 6 oz)         Most recent labs:    Recent Labs     10/03/19  1239 10/03/19  1749 10/04/19  0628   WBC 23 10*  --  10 40   HGB 11 4*  --  9 9*   HCT 36 1*  --  31 3*     --  201   K 4 8  --  3 8   CALCIUM 9 7  --  8 9   BUN 26*  --  23   CREATININE 1 53*  --  0 94   INR 1 15  --   --    TROPONINI 0 06* 0 04*  --    AST 40*  --   --    ALT 19  --   --    ALKPHOS 89  --   --        Scheduled Meds:  Current Facility-Administered Medications:  acetaminophen 650 mg Oral Q6H PRN Lupis Bain MD    aspirin 81 mg Oral Daily Lupis Bain MD    cefTRIAXone 1,000 mg Intravenous Q24H Lupis Bain MD    donepezil 10 mg Oral HS Lupis Bain MD    gabapentin 400 mg Oral BID Lupis Bain MD    heparin (porcine) 5,000 Units Subcutaneous Q12H [address] [place] Maricel Collado MD    levETIRAcetam 1,000 mg Oral BID Lorenzo Cain MD    levETIRAcetam 250 mg Oral BID Lorenzo Cain MD    metoprolol tartrate 25 mg Oral Q12H 130 Cara Lawrence MD    ondansetron 4 mg Intravenous Q6H PRN Lorenzo Cain MD    pravastatin 20 mg Oral Daily With Blane Trinidad MD    sodium chloride 75 mL/hr Intravenous Continuous MATEUSZ Yancey Last Rate: 75 mL/hr (10/04/19 0917)     Continuous Infusions:  sodium chloride 75 mL/hr Last Rate: 75 mL/hr (10/04/19 0917)     PRN Meds:   acetaminophen    ondansetron    Surgical procedures (if appropriate):

## 2019-10-05 LAB
ANION GAP SERPL CALCULATED.3IONS-SCNC: 7 MMOL/L (ref 4–13)
BACTERIA UR CULT: ABNORMAL
BACTERIA UR CULT: ABNORMAL
BASOPHILS # BLD AUTO: 0.1 THOUSANDS/ΜL (ref 0–0.1)
BASOPHILS NFR BLD AUTO: 1 % (ref 0–2)
BUN SERPL-MCNC: 16 MG/DL (ref 7–25)
CALCIUM SERPL-MCNC: 8.8 MG/DL (ref 8.6–10.5)
CHLORIDE SERPL-SCNC: 112 MMOL/L (ref 98–107)
CO2 SERPL-SCNC: 26 MMOL/L (ref 21–31)
CREAT SERPL-MCNC: 0.76 MG/DL (ref 0.6–1.2)
EOSINOPHIL # BLD AUTO: 0 THOUSAND/ΜL (ref 0–0.61)
EOSINOPHIL NFR BLD AUTO: 0 % (ref 0–5)
ERYTHROCYTE [DISTWIDTH] IN BLOOD BY AUTOMATED COUNT: 16.7 % (ref 11.5–14.5)
GFR SERPL CREATININE-BSD FRML MDRD: 73 ML/MIN/1.73SQ M
GLUCOSE SERPL-MCNC: 88 MG/DL (ref 65–99)
HCT VFR BLD AUTO: 31.5 % (ref 42–47)
HGB BLD-MCNC: 9.9 G/DL (ref 12–16)
LYMPHOCYTES # BLD AUTO: 1.9 THOUSANDS/ΜL (ref 0.6–4.47)
LYMPHOCYTES NFR BLD AUTO: 27 % (ref 21–51)
MCH RBC QN AUTO: 22.2 PG (ref 26–34)
MCHC RBC AUTO-ENTMCNC: 31.3 G/DL (ref 31–37)
MCV RBC AUTO: 71 FL (ref 81–99)
MONOCYTES # BLD AUTO: 0.8 THOUSAND/ΜL (ref 0.17–1.22)
MONOCYTES NFR BLD AUTO: 11 % (ref 2–12)
NEUTROPHILS # BLD AUTO: 4.3 THOUSANDS/ΜL (ref 1.4–6.5)
NEUTS SEG NFR BLD AUTO: 61 % (ref 42–75)
PLATELET # BLD AUTO: 194 THOUSANDS/UL (ref 149–390)
PMV BLD AUTO: 8.8 FL (ref 8.6–11.7)
POTASSIUM SERPL-SCNC: 3.9 MMOL/L (ref 3.5–5.5)
PROCALCITONIN SERPL-MCNC: 8.84 NG/ML
RBC # BLD AUTO: 4.45 MILLION/UL (ref 3.9–5.2)
SODIUM SERPL-SCNC: 145 MMOL/L (ref 134–143)
WBC # BLD AUTO: 7.1 THOUSAND/UL (ref 4.8–10.8)

## 2019-10-05 PROCEDURE — 80048 BASIC METABOLIC PNL TOTAL CA: CPT | Performed by: NURSE PRACTITIONER

## 2019-10-05 PROCEDURE — 85025 COMPLETE CBC W/AUTO DIFF WBC: CPT | Performed by: NURSE PRACTITIONER

## 2019-10-05 PROCEDURE — 99232 SBSQ HOSP IP/OBS MODERATE 35: CPT | Performed by: NURSE PRACTITIONER

## 2019-10-05 PROCEDURE — 84145 PROCALCITONIN (PCT): CPT | Performed by: NURSE PRACTITIONER

## 2019-10-05 PROCEDURE — 82728 ASSAY OF FERRITIN: CPT | Performed by: NURSE PRACTITIONER

## 2019-10-05 PROCEDURE — 83540 ASSAY OF IRON: CPT | Performed by: NURSE PRACTITIONER

## 2019-10-05 PROCEDURE — 83550 IRON BINDING TEST: CPT | Performed by: NURSE PRACTITIONER

## 2019-10-05 RX ORDER — DEXTROSE AND SODIUM CHLORIDE 5; .45 G/100ML; G/100ML
75 INJECTION, SOLUTION INTRAVENOUS CONTINUOUS
Status: DISCONTINUED | OUTPATIENT
Start: 2019-10-05 | End: 2019-10-06

## 2019-10-05 RX ADMIN — DONEPEZIL HYDROCHLORIDE 10 MG: 5 TABLET ORAL at 21:00

## 2019-10-05 RX ADMIN — GABAPENTIN 400 MG: 100 CAPSULE ORAL at 09:09

## 2019-10-05 RX ADMIN — LEVETIRACETAM 1000 MG: 500 TABLET, FILM COATED ORAL at 17:34

## 2019-10-05 RX ADMIN — GABAPENTIN 400 MG: 100 CAPSULE ORAL at 17:34

## 2019-10-05 RX ADMIN — DEXTROSE AND SODIUM CHLORIDE 75 ML/HR: 5; 450 INJECTION, SOLUTION INTRAVENOUS at 14:46

## 2019-10-05 RX ADMIN — METOPROLOL TARTRATE 25 MG: 25 TABLET ORAL at 20:58

## 2019-10-05 RX ADMIN — LEVETIRACETAM 1000 MG: 500 TABLET, FILM COATED ORAL at 09:10

## 2019-10-05 RX ADMIN — LEVETIRACETAM 250 MG: 250 TABLET ORAL at 09:10

## 2019-10-05 RX ADMIN — PRAVASTATIN SODIUM 20 MG: 20 TABLET ORAL at 17:34

## 2019-10-05 RX ADMIN — HEPARIN SODIUM 5000 UNITS: 5000 INJECTION, SOLUTION INTRAVENOUS; SUBCUTANEOUS at 20:58

## 2019-10-05 RX ADMIN — CEFTRIAXONE 1000 MG: 1 INJECTION, SOLUTION INTRAVENOUS at 18:40

## 2019-10-05 RX ADMIN — METOPROLOL TARTRATE 25 MG: 25 TABLET ORAL at 09:10

## 2019-10-05 RX ADMIN — ASPIRIN 81 MG 81 MG: 81 TABLET ORAL at 09:10

## 2019-10-05 RX ADMIN — HEPARIN SODIUM 5000 UNITS: 5000 INJECTION, SOLUTION INTRAVENOUS; SUBCUTANEOUS at 09:10

## 2019-10-05 RX ADMIN — LEVETIRACETAM 250 MG: 250 TABLET ORAL at 17:34

## 2019-10-05 NOTE — PLAN OF CARE
Problem: Potential for Falls  Goal: Patient will remain free of falls  Description  INTERVENTIONS:  - Assess patient frequently for physical needs  -  Identify cognitive and physical deficits and behaviors that affect risk of falls    -  [place] fall precautions as indicated by assessment   - Educate patient/family on patient safety including physical limitations  - Instruct patient to call for assistance with activity based on assessment  - Modify environment to reduce risk of injury  - Consider OT/PT consult to assist with strengthening/mobility  Outcome: Progressing     Problem: Prexisting or High Potential for Compromised Skin Integrity  Goal: Skin integrity is maintained or improved  Description  INTERVENTIONS:  - Identify patients at risk for skin breakdown  - Assess and monitor skin integrity  - Assess and monitor nutrition and hydration status  - Monitor labs   - Assess for incontinence   - Turn and reposition patient  - Assist with mobility/ambulation  - Relieve pressure over bony prominences  - Avoid friction and shearing  - Provide appropriate hygiene as needed including keeping skin clean and dry  - Evaluate need for skin moisturizer/barrier cream  - Collaborate with interdisciplinary team   - Patient/family teaching  - Consider wound care consult   Outcome: Progressing     Problem: NEUROSENSORY - ADULT  Goal: Achieves stable or improved neurological status  Description  INTERVENTIONS  - Monitor and report changes in neurological status  - Monitor vital signs such as temperature, blood pressure, glucose, and any other labs ordered   - Initiate measures to prevent increased intracranial pressure  - Monitor for seizure activity and implement precautions if appropriate      Outcome: Progressing     Problem: GENITOURINARY - ADULT  Goal: Maintains or returns to baseline urinary function  Description  INTERVENTIONS:  - Assess urinary function  - Encourage oral fluids to ensure adequate hydration if ordered  - Administer IV fluids as ordered to ensure adequate hydration  - Administer ordered medications as needed  - Offer frequent toileting  - Follow urinary retention protocol if ordered  Outcome: Progressing     Problem: INFECTION - ADULT  Goal: Absence or prevention of progression during hospitalization  Description  INTERVENTIONS:  - Assess and monitor for signs and symptoms of infection  - Monitor lab/diagnostic results  - Monitor all insertion sites, i e  indwelling lines, tubes, and drains  - Monitor endotracheal if appropriate and nasal secretions for changes in amount and color  - [place] appropriate cooling/warming therapies per order  - Administer medications as ordered  - Instruct and encourage patient and family to use good hand hygiene technique  - Identify and instruct in appropriate isolation precautions for identified infection/condition  Outcome: Progressing     Problem: SAFETY ADULT  Goal: Maintain or return to baseline ADL function  Description  INTERVENTIONS:  -  Assess patient's ability to carry out ADLs; assess patient's baseline for ADL function and identify physical deficits which impact ability to perform ADLs (bathing, care of mouth/teeth, toileting, grooming, dressing, etc )  - Assess/evaluate cause of self-care deficits   - Assess range of motion  - Assess patient's mobility; develop plan if impaired  - Assess patient's need for assistive devices and provide as appropriate  - Encourage maximum independence but intervene and supervise when necessary  - Involve family in performance of ADLs  - Assess for home care needs following discharge   - Consider OT consult to assist with ADL evaluation and planning for discharge  - Provide patient education as appropriate  Outcome: Progressing  Goal: Maintain or return mobility status to optimal level  Description  INTERVENTIONS:  - Assess patient's baseline mobility status (ambulation, transfers, stairs, etc )    - Identify cognitive and physical deficits and behaviors that affect mobility  - Identify mobility aids required to assist with transfers and/or ambulation (gait belt, sit-to-stand, lift, walker, cane, etc )  - [place] fall precautions as indicated by assessment  - Record patient progress and toleration of activity level on Mobility SBAR; progress patient to next Phase/Stage  - Instruct patient to call for assistance with activity based on assessment  - Consider rehabilitation consult to assist with strengthening/weightbearing, etc   Outcome: Progressing     Problem: DISCHARGE PLANNING  Goal: Discharge to home or other facility with appropriate resources  Description  INTERVENTIONS:  - Identify barriers to discharge w/patient and caregiver  - Arrange for needed discharge resources and transportation as appropriate  - Identify discharge learning needs (meds, wound care, etc )  - Arrange for interpretive services to assist at discharge as needed  - Refer to Case Management Department for coordinating discharge planning if the patient needs post-hospital services based on physician/advanced practitioner order or complex needs related to functional status, cognitive ability, or social support system  Outcome: Progressing     Problem: Knowledge Deficit  Goal: Patient/family/caregiver demonstrates understanding of disease process, treatment plan, medications, and discharge instructions  Description  Complete learning assessment and assess knowledge base    Interventions:  - Provide teaching at level of understanding  - Provide teaching via preferred learning methods  Outcome: Progressing     Problem: DISCHARGE PLANNING - CARE MANAGEMENT  Goal: Discharge to post-acute care or home with appropriate resources  Description  INTERVENTIONS:  - Conduct assessment to determine patient/family and health care team treatment goals, and need for post-acute services based on payer coverage, community resources, and patient preferences, and barriers to discharge  - Address psychosocial, clinical, and financial barriers to discharge as identified in assessment in conjunction with the patient/family and health care team  - Arrange appropriate level of post-acute services according to patient's   needs and preference and payer coverage in collaboration with the physician and health care team  - Communicate with and update the patient/family, physician, and health care team regarding progress on the discharge plan  - Arrange appropriate transportation to post-acute venues  Discharge home with live in [facility] and continue ADC 2x/week  Daughter will transport home     Outcome: Progressing

## 2019-10-05 NOTE — PROGRESS NOTES
Progress Note Ashley Akbar 1935, 81 y o  female MRN: [id number]    Unit/Bed#: -02 Encounter: [id number]    Primary Care Provider: Divya Braden MD   Date and time admitted to hospital: 10/3/2019 12:18 PM        * Severe sepsis [facility])  Assessment & Plan  · Due to UTI  · Patient with leukocytosis and tachypnea  Also with organ dysfunction given that patient has acute renal failure  · Will continue IV antibiotics with ceftriaxone day#3  · Urine culture- E coli and A urinae- susceptible to cephalosporin   · Blood cultures- no growth to date  · Trend procalcitonin  · Lactate within normal limits    Elevated troponin I level  Assessment & Plan  · Likely due to acute infection/severe sepsis   · Asymptomatic   · Troponin levels 0 05, 0 04- no true rising or falling   · EKG shows no ischemic changes     Acute renal failure (ARF) (HCC)  Assessment & Plan  · Likely secondary to volume depletion and sepsis  · Renal function is much improved   · Continue IV fluids but will change to D51/2 for slight elevation of Na  · Renal ultrasound result appreciated  · Monitor creatinine, if worsening will consider Nephrology consultation    Toxic metabolic encephalopathy  Assessment & Plan  · CT head with no acute findings  · Likely secondary to sepsis  · Continue supportive care      Dementia without behavioral disturbance (HCC)  Assessment & Plan  · Continue Aricept  · Supportive care      Essential hypertension  Assessment & Plan  · BP stable  · Continue home meds  · Will monitor     Seizure disorder (HCC)  Assessment & Plan  · Continue Keppra  · Seizure precautions       VTE Pharmacologic Prophylaxis: Pharmacologic: Heparin    Patient Centered Rounds: I have performed bedside rounds with nursing staff today      Discussions with Specialists or Other Care Team Provider: nursing, patient, PT/OT, Rx  Education and Discussions with Family / Patient: patient and daughter     Current Length of Stay: 2 day(s)    Current Patient Status: Inpatient   Certification Statement: The patient will continue to require additional inpatient hospital stay due to severe sepsis    Discharge Plan: pending hospital course  Hopeful in 24 hours  Code Status: Level 3 - DNAR and DNI    Subjective:   Feeling okay  Denies any pain  Objective:     Vitals:   Temp (24hrs), Av 1 °F (36 7 °C), Min:96 °F (35 6 °C), Max:99 3 °F (37 4 °C)    Temp:  [96 °F (35 6 °C)-99 3 °F (37 4 °C)] 96 °F (35 6 °C)  HR:  [60-64] 60  Resp:  [16-18] 16  BP: (148-164)/(56-78) 164/56  SpO2:  [94 %-95 %] 95 %  Body mass index is 24 2 kg/m²  Input and Output Summary (last 24 hours): Intake/Output Summary (Last 24 hours) at 10/5/2019 1445  Last data filed at 10/5/2019 0902  Gross per 24 hour   Intake 280 ml   Output    Net 280 ml       Physical Exam:     Physical Exam   Constitutional: She appears well-developed and well-nourished  No distress  HENT:   Head: Normocephalic and atraumatic  Mouth/Throat: Oropharynx is clear and moist    Eyes: Pupils are equal, round, and reactive to light  Conjunctivae and EOM are normal    Neck: Normal range of motion  Neck supple  No thyromegaly present  Cardiovascular: Normal rate, regular rhythm and normal heart sounds  Exam reveals no gallop and no friction rub  No murmur heard  Pulmonary/Chest: Effort normal and breath sounds normal  She has no wheezes  She has no rales  Abdominal: Soft  Bowel sounds are normal  She exhibits no distension  There is no tenderness  There is no rebound  Musculoskeletal: Normal range of motion  She exhibits no edema, tenderness or deformity  Neurological: She is alert  No cranial nerve deficit  With baseline dementia      Skin: Skin is warm and dry  No rash noted  No erythema  Psychiatric: She has a normal mood and affect  Her behavior is normal    Vitals reviewed        Additional Data:     Labs:    Results from last 7 days   Lab Units 10/05/19  0505   WBC Thousand/uL 7 10 HEMOGLOBIN g/dL 9 9*   HEMATOCRIT % 31 5*   PLATELETS Thousands/uL 194   NEUTROS PCT % 61   LYMPHS PCT % 27   MONOS PCT % 11   EOS PCT % 0     Results from last 7 days   Lab Units 10/05/19  0505  10/03/19  1239   POTASSIUM mmol/L 3 9   < > 4 8   CHLORIDE mmol/L 112*   < > 104   CO2 mmol/L 26   < > 26   BUN mg/dL 16   < > 26*   CREATININE mg/dL 0 76   < > 1 53*   CALCIUM mg/dL 8 8   < > 9 7   ALK PHOS U/L  --   --  89   ALT U/L  --   --  19   AST U/L  --   --  40*    < > = values in this interval not displayed  Results from last 7 days   Lab Units 10/03/19  1239   INR  1 15               * I Have Reviewed All Lab Data Listed Above  * Additional Pertinent Lab Tests Reviewed: Erica 66 Admission  Reviewed    Imaging:  Imaging Reports Reviewed Today Include: n/a     Recent Cultures (last 7 days):     Results from last 7 days   Lab Units 10/03/19  1433 10/03/19  1254   BLOOD CULTURE  No Growth at 24 hrs    No Growth at 24 hrs   --    URINE CULTURE   --  >100,000 cfu/ml Escherichia coli*  >100,000 cfu/ml Aerococcus urinae*   LEGIONELLA URINARY ANTIGEN   --  Negative       Last 24 Hours Medication List:     Current Facility-Administered Medications:  acetaminophen 650 mg Oral Q6H PRN Tad Pop MD    aspirin 81 mg Oral Daily Tad Pop MD    cefTRIAXone 1,000 mg Intravenous Q24H Tad Pop MD Last Rate: 1,000 mg (10/04/19 1726)   dextrose 5 % and sodium chloride 0 45 % 75 mL/hr Intravenous Continuous MATEUSZ Perales    donepezil 10 mg Oral HS Tad Pop MD    gabapentin 400 mg Oral BID Tad Pop MD    heparin (porcine) 5,000 Units Subcutaneous Q12H Dana Barker MD    levETIRAcetam 1,000 mg Oral BID Tda Pop MD    levETIRAcetam 250 mg Oral BID Tad Pop MD    metoprolol tartrate 25 mg Oral Q12H Dana Barker MD    ondansetron 4 mg Intravenous Q6H PRN Tad Pop MD    pravastatin 20 mg Oral Daily With Jw Wheat MD         Today, Patient Was Seen By: MATEUSZ Elkins    ** Please Note: Dictation voice to text software may have been used in the creation of this document   **

## 2019-10-05 NOTE — SOCIAL WORK
Pt for possible discharge tomorrow per Myrtle Stevenson  Pt will return home with daughter Faye bingham  IMM reviewed and copy provided  Pt will return home with live in  and continue to go to [place] [id number] [facility]  Pt cleared to return home with no recommended follow up by PT  No further needs identified  CM to follow as needed

## 2019-10-05 NOTE — ASSESSMENT & PLAN NOTE
· Likely secondary to volume depletion and sepsis  · Renal function is much improved   · Continue IV fluids but will change to D51/2 for slight elevation of Na  · Renal ultrasound result appreciated  · Monitor creatinine, if worsening will consider Nephrology consultation

## 2019-10-05 NOTE — INCIDENTAL FINDINGS
The following findings require follow up:  Radiographic finding   Finding: 3 4x 1 4 x 1 9 cm echogenic lesion at the peripheral aspect of patent segment 7   Follow up required:  Non urgent triple phase liver CT or MRI   Follow up should be done within 1-2 month(s)    Please notify the following clinician to assist with the follow up:   Dr Joni Russell- PCP

## 2019-10-05 NOTE — ASSESSMENT & PLAN NOTE
· Due to UTI  · Patient with leukocytosis and tachypnea    Also with organ dysfunction given that patient has acute renal failure  · Will continue IV antibiotics with ceftriaxone day#3  · Urine culture- E coli and A urinae- susceptible to cephalosporin   · Blood cultures- no growth to date  · Trend procalcitonin  · Lactate within normal limits

## 2019-10-06 VITALS
RESPIRATION RATE: 18 BRPM | WEIGHT: 151.68 LBS | DIASTOLIC BLOOD PRESSURE: 90 MMHG | HEART RATE: 60 BPM | SYSTOLIC BLOOD PRESSURE: 160 MMHG | BODY MASS INDEX: 24.48 KG/M2 | TEMPERATURE: 97.9 F | OXYGEN SATURATION: 97 %

## 2019-10-06 PROBLEM — K76.9 HEPATIC LESION: Status: ACTIVE | Noted: 2019-10-06

## 2019-10-06 PROBLEM — N17.9 ACUTE RENAL FAILURE (ARF) (HCC): Status: RESOLVED | Noted: 2019-10-03 | Resolved: 2019-10-06

## 2019-10-06 LAB
ANION GAP SERPL CALCULATED.3IONS-SCNC: 7 MMOL/L (ref 4–13)
BUN SERPL-MCNC: 10 MG/DL (ref 7–25)
CALCIUM SERPL-MCNC: 8.7 MG/DL (ref 8.6–10.5)
CHLORIDE SERPL-SCNC: 109 MMOL/L (ref 98–107)
CO2 SERPL-SCNC: 27 MMOL/L (ref 21–31)
CREAT SERPL-MCNC: 0.71 MG/DL (ref 0.6–1.2)
ERYTHROCYTE [DISTWIDTH] IN BLOOD BY AUTOMATED COUNT: 16 % (ref 11.5–14.5)
FERRITIN SERPL-MCNC: 119 NG/ML (ref 8–388)
GFR SERPL CREATININE-BSD FRML MDRD: 79 ML/MIN/1.73SQ M
GLUCOSE SERPL-MCNC: 103 MG/DL (ref 65–99)
HCT VFR BLD AUTO: 29.9 % (ref 42–47)
HGB BLD-MCNC: 9.2 G/DL (ref 12–16)
IRON SATN MFR SERPL: 16 %
IRON SERPL-MCNC: 47 UG/DL (ref 50–170)
MCH RBC QN AUTO: 21.8 PG (ref 26–34)
MCHC RBC AUTO-ENTMCNC: 30.8 G/DL (ref 31–37)
MCV RBC AUTO: 71 FL (ref 81–99)
PLATELET # BLD AUTO: 201 THOUSANDS/UL (ref 149–390)
PMV BLD AUTO: 8.4 FL (ref 8.6–11.7)
POTASSIUM SERPL-SCNC: 3.6 MMOL/L (ref 3.5–5.5)
PROCALCITONIN SERPL-MCNC: 4.52 NG/ML
RBC # BLD AUTO: 4.22 MILLION/UL (ref 3.9–5.2)
SODIUM SERPL-SCNC: 143 MMOL/L (ref 134–143)
TIBC SERPL-MCNC: 286 UG/DL (ref 250–450)
WBC # BLD AUTO: 6.2 THOUSAND/UL (ref 4.8–10.8)

## 2019-10-06 PROCEDURE — 85027 COMPLETE CBC AUTOMATED: CPT | Performed by: NURSE PRACTITIONER

## 2019-10-06 PROCEDURE — 99239 HOSP IP/OBS DSCHRG MGMT >30: CPT | Performed by: NURSE PRACTITIONER

## 2019-10-06 PROCEDURE — 80048 BASIC METABOLIC PNL TOTAL CA: CPT | Performed by: NURSE PRACTITIONER

## 2019-10-06 PROCEDURE — 84145 PROCALCITONIN (PCT): CPT | Performed by: NURSE PRACTITIONER

## 2019-10-06 RX ORDER — CEPHALEXIN 500 MG/1
500 CAPSULE ORAL EVERY 12 HOURS SCHEDULED
Status: DISCONTINUED | OUTPATIENT
Start: 2019-10-07 | End: 2019-10-06 | Stop reason: HOSPADM

## 2019-10-06 RX ORDER — CEFTRIAXONE 1 G/50ML
1000 INJECTION, SOLUTION INTRAVENOUS EVERY 24 HOURS
Status: COMPLETED | OUTPATIENT
Start: 2019-10-06 | End: 2019-10-06

## 2019-10-06 RX ORDER — CEPHALEXIN 500 MG/1
500 CAPSULE ORAL EVERY 12 HOURS SCHEDULED
Qty: 6 CAPSULE | Refills: 0 | Status: SHIPPED | OUTPATIENT
Start: 2019-10-07 | End: 2019-10-10

## 2019-10-06 RX ORDER — POTASSIUM CHLORIDE 20 MEQ/1
40 TABLET, EXTENDED RELEASE ORAL ONCE
Status: COMPLETED | OUTPATIENT
Start: 2019-10-06 | End: 2019-10-06

## 2019-10-06 RX ADMIN — CEFTRIAXONE 1000 MG: 1 INJECTION, SOLUTION INTRAVENOUS at 09:20

## 2019-10-06 RX ADMIN — ASPIRIN 81 MG 81 MG: 81 TABLET ORAL at 09:18

## 2019-10-06 RX ADMIN — METOPROLOL TARTRATE 25 MG: 25 TABLET ORAL at 09:19

## 2019-10-06 RX ADMIN — GABAPENTIN 400 MG: 100 CAPSULE ORAL at 09:19

## 2019-10-06 RX ADMIN — HEPARIN SODIUM 5000 UNITS: 5000 INJECTION, SOLUTION INTRAVENOUS; SUBCUTANEOUS at 09:19

## 2019-10-06 RX ADMIN — LEVETIRACETAM 250 MG: 250 TABLET ORAL at 09:19

## 2019-10-06 RX ADMIN — POTASSIUM CHLORIDE 40 MEQ: 1500 TABLET, EXTENDED RELEASE ORAL at 09:18

## 2019-10-06 RX ADMIN — LEVETIRACETAM 1000 MG: 500 TABLET, FILM COATED ORAL at 09:19

## 2019-10-06 NOTE — ASSESSMENT & PLAN NOTE
· Due to UTI  · Patient with leukocytosis and tachypnea  Also with organ dysfunction given that patient has acute renal failure  · Clinically is much improved  · Received ceftriaxone   Will transition to keflex for 3 more days on discharge to complete 7 days course   · Urine culture- E coli and A urinae- susceptible to cephalosporin   · Blood cultures- no growth to date   · procalcitonin- much improved 20 3 --> 8 84   · Lactate within normal limits

## 2019-10-06 NOTE — ASSESSMENT & PLAN NOTE
· Likely secondary to volume depletion and sepsis  · Renal function is much improved   · Continue IV fluids but will change to D51/2 for slight elevation of Na  · This is resolved   · Discussed in details with patient's daughter- to encourage fluid/free water intake at home   · Renal ultrasound result appreciated  · Monitor creatinine, if worsening will consider Nephrology consultation

## 2019-10-06 NOTE — DISCHARGE SUMMARY
Discharge- Brianna Lute 1935, 81 y o  female MRN: [id number]    Unit/Bed#: -02 Encounter: [id number]    Primary Care Provider: Lan Doll MD   Date and time admitted to hospital: 10/3/2019 12:18 PM        * Severe sepsis [facility])  Assessment & Plan  · Due to UTI  · Patient with leukocytosis and tachypnea  Also with organ dysfunction given that patient has acute renal failure  · Clinically is much improved  · Received ceftriaxone   Will transition to keflex for 3 more days on discharge to complete 7 days course   · Urine culture- E coli and A urinae- susceptible to cephalosporin   · Blood cultures- no growth to date   · procalcitonin- much improved 20 3 --> 8 84   · Lactate within normal limits    Hepatic lesion  Assessment & Plan  · Incidental finding on CT chest   · US RUQ suspects to be a cavernous hemangioma  · Recommend to follow up with triple phase liver CT or MRI outpatient     Elevated troponin I level  Assessment & Plan  · Likely due to acute infection/severe sepsis   · Asymptomatic   · Troponin levels 0 05, 0 04- no true rising or falling   · EKG shows no ischemic changes     Acute renal failure (ARF) (HCC)  Assessment & Plan  · Likely secondary to volume depletion and sepsis  · Renal function is much improved   · Continue IV fluids but will change to D51/2 for slight elevation of Na  · This is resolved   · Discussed in details with patient's daughter- to encourage fluid/free water intake at home   · Renal ultrasound result appreciated  · Monitor creatinine, if worsening will consider Nephrology consultation    Toxic metabolic encephalopathy  Assessment & Plan  · CT head with no acute findings  · Likely secondary to sepsis  · Continue supportive care      Dementia without behavioral disturbance (HCC)  Assessment & Plan  · Continue Aricept  · Supportive care      Essential hypertension  Assessment & Plan  · BP stable  · Resume home medications    Seizure disorder [facility])  Assessment & Plan  · Continue Keppra  · Seizure precautions         Discharging Physician / Practitioner: MATEUSZ Trujillo  PCP: Paolo Fuentes MD  Admission Date:   Admission Orders (From admission, onward)     Ordered        10/03/19 1429  Inpatient Admission (expected length of stay for this patient Order details is greater than two midnights)  Once                   Discharge Date: 10/06/19    Resolved Problems  Date Reviewed: 10/6/2019    None          Consultations During Hospital Stay:  · None     Procedures Performed:   · None     Significant Findings / Test Results:   · US RUQ   Echogenic lesion at the peripheral aspect of hepatic segment 7, statistically a cavernous hemangioma   Triple phase liver CT or MRI could be obtained for confirmation, as clinically warranted  · CT chest 1   Mild pulmonary edema and scattered subsegmental atelectasis   No focal consolidations  2   2 0 x 2 8 cm indeterminate lesion within segment 7 of the liver, possibly representing complex cyst   Further evaluation with  right upper quadrant ultrasound recommended   If ultrasound is inconclusive, further evaluation with contrast-enhanced   cross-sectional imaging may be indicated  · US renal- no hydronephrosis or renal calculi  · Chest x-rayNo acute cardiopulmonary disease  Lateral right perihilar nodular density   Recommend CT  · CT head No acute intracranial abnormality  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  Mild to moderate chronic small vessel ischemic changes and volume loss  Incidental Findings:   · Hepatic lesion      Test Results Pending at Discharge (will require follow up): · Final blood culture      Outpatient Tests Requested:  · Follow-up with PCP    Complications:     None     Reason for Admission:  Altered mental status    Hospital Course:     Lucinda Herring is a 81 y o  female patient who originally presented to the hospital on 10/3/2019 due to altered mental status    The patient was noted on the day of admission to be excessive drowsy at the Adult  program   She was admitted with severe sepsis secondary to urinary tract infection  The patient was noted to have encephalopathy which is suspected to be due to toxic metabolic due to acute infection  She was started on IV ceftriaxone and IV fluid  Patient has significantly elevated procalcitonin  This is much improved with antibiotics treatment  Urine culture shows E coli which is susceptible to cephalosporin  Blood culture shows no growth to date  The patient was noted to have mild elevation of troponin levels  ACS is ruled out  The patient is asymptomatic  No ischemic changes on the EKG  Acute kidney injury due to volume depletion is resolved after IV fluid  I discussed discharge planning in detail with her daughter- Jaylan Osorio  The patient will be discharged with 3 more days of Keflex to complete 7 day course of antibiotic therapy  Additionally, an incident finding of hepatic lesion was noted on CT scan of the chest   Follow-up ultrasound of left upper quadrant shows suspected hemangioma  Please see above list of diagnoses and related plan for additional information  Condition at Discharge: good     Discharge Day Visit / Exam:     Subjective:  Feeling good  Unable to obtained  Vitals: Blood Pressure: 160/90 (10/06/19 0723)  Pulse: 60 (10/06/19 0723)  Temperature: 97 9 °F (36 6 °C) (10/06/19 0723)  Temp Source: Tympanic (10/06/19 0723)  Respirations: 18 (10/06/19 0723)  Weight - Scale: 68 8 kg (151 lb 10 8 oz) (10/06/19 0600)  SpO2: 97 % (10/06/19 0723)  Exam:   Physical Exam   Constitutional: She appears well-developed and well-nourished  No distress  HENT:   Head: Normocephalic and atraumatic  Mouth/Throat: Oropharynx is clear and moist    Eyes: Pupils are equal, round, and reactive to light  Conjunctivae and EOM are normal    Neck: Normal range of motion  Neck supple  No thyromegaly present     Cardiovascular: Normal rate, regular rhythm and normal heart sounds  Exam reveals no gallop and no friction rub  No murmur heard  Pulmonary/Chest: Effort normal and breath sounds normal  She has no wheezes  She has no rales  Abdominal: Soft  Bowel sounds are normal  She exhibits no distension  There is no tenderness  There is no rebound  Musculoskeletal: Normal range of motion  She exhibits no edema, tenderness or deformity  Neurological: She is alert  No cranial nerve deficit  Baseline dementia      Skin: Skin is warm and dry  No rash noted  No erythema  Psychiatric: She has a normal mood and affect  Her behavior is normal    Vitals reviewed  Discussion with Family: Ana Marvin- daughter [phone]    Discharge instructions/Information to patient and family:   See after visit summary for information provided to patient and family  Provisions for Follow-Up Care:  See after visit summary for information related to follow-up care and any pertinent home health orders  Disposition:     Home    For Discharges to [facility] SNF:   · Not Applicable to this Patient - Not Applicable to this Patient    Planned Readmission:    No      Discharge Statement:  I spent 32 minutes discharging the patient  This time was spent on the day of discharge  I had direct contact with the patient on the day of discharge  Greater than 50% of the total time was spent examining patient, answering all patient questions, arranging and discussing plan of care with patient as well as directly providing post-discharge instructions  Additional time then spent on discharge activities  Discharge Medications:  See after visit summary for reconciled discharge medications provided to patient and family        ** Please Note: This note has been constructed using a voice recognition system **

## 2019-10-06 NOTE — NURSING NOTE
Discharge instructions explained to daughter  Daughter states understanding  Will have hospitalist call script for antibiotic to preferred pharmacy

## 2019-10-06 NOTE — PLAN OF CARE
Problem: Potential for Falls  Goal: Patient will remain free of falls  Description  INTERVENTIONS:  - Assess patient frequently for physical needs  -  Identify cognitive and physical deficits and behaviors that affect risk of falls    -  [place] fall precautions as indicated by assessment   - Educate patient/family on patient safety including physical limitations  - Instruct patient to call for assistance with activity based on assessment  - Modify environment to reduce risk of injury  - Consider OT/PT consult to assist with strengthening/mobility  Outcome: Progressing     Problem: Prexisting or High Potential for Compromised Skin Integrity  Goal: Skin integrity is maintained or improved  Description  INTERVENTIONS:  - Identify patients at risk for skin breakdown  - Assess and monitor skin integrity  - Assess and monitor nutrition and hydration status  - Monitor labs   - Assess for incontinence   - Turn and reposition patient  - Assist with mobility/ambulation  - Relieve pressure over bony prominences  - Avoid friction and shearing  - Provide appropriate hygiene as needed including keeping skin clean and dry  - Evaluate need for skin moisturizer/barrier cream  - Collaborate with interdisciplinary team   - Patient/family teaching  - Consider wound care consult   Outcome: Progressing     Problem: NEUROSENSORY - ADULT  Goal: Achieves stable or improved neurological status  Description  INTERVENTIONS  - Monitor and report changes in neurological status  - Monitor vital signs such as temperature, blood pressure, glucose, and any other labs ordered   - Initiate measures to prevent increased intracranial pressure  - Monitor for seizure activity and implement precautions if appropriate      Outcome: Progressing     Problem: GENITOURINARY - ADULT  Goal: Maintains or returns to baseline urinary function  Description  INTERVENTIONS:  - Assess urinary function  - Encourage oral fluids to ensure adequate hydration if ordered  - Administer IV fluids as ordered to ensure adequate hydration  - Administer ordered medications as needed  - Offer frequent toileting  - Follow urinary retention protocol if ordered  Outcome: Progressing     Problem: INFECTION - ADULT  Goal: Absence or prevention of progression during hospitalization  Description  INTERVENTIONS:  - Assess and monitor for signs and symptoms of infection  - Monitor lab/diagnostic results  - Monitor all insertion sites, i e  indwelling lines, tubes, and drains  - Monitor endotracheal if appropriate and nasal secretions for changes in amount and color  - [place] appropriate cooling/warming therapies per order  - Administer medications as ordered  - Instruct and encourage patient and family to use good hand hygiene technique  - Identify and instruct in appropriate isolation precautions for identified infection/condition  Outcome: Progressing     Problem: SAFETY ADULT  Goal: Maintain or return to baseline ADL function  Description  INTERVENTIONS:  -  Assess patient's ability to carry out ADLs; assess patient's baseline for ADL function and identify physical deficits which impact ability to perform ADLs (bathing, care of mouth/teeth, toileting, grooming, dressing, etc )  - Assess/evaluate cause of self-care deficits   - Assess range of motion  - Assess patient's mobility; develop plan if impaired  - Assess patient's need for assistive devices and provide as appropriate  - Encourage maximum independence but intervene and supervise when necessary  - Involve family in performance of ADLs  - Assess for home care needs following discharge   - Consider OT consult to assist with ADL evaluation and planning for discharge  - Provide patient education as appropriate  Outcome: Progressing  Goal: Maintain or return mobility status to optimal level  Description  INTERVENTIONS:  - Assess patient's baseline mobility status (ambulation, transfers, stairs, etc )    - Identify cognitive and physical deficits and behaviors that affect mobility  - Identify mobility aids required to assist with transfers and/or ambulation (gait belt, sit-to-stand, lift, walker, cane, etc )  - [place] fall precautions as indicated by assessment  - Record patient progress and toleration of activity level on Mobility SBAR; progress patient to next Phase/Stage  - Instruct patient to call for assistance with activity based on assessment  - Consider rehabilitation consult to assist with strengthening/weightbearing, etc   Outcome: Progressing     Problem: DISCHARGE PLANNING  Goal: Discharge to home or other facility with appropriate resources  Description  INTERVENTIONS:  - Identify barriers to discharge w/patient and caregiver  - Arrange for needed discharge resources and transportation as appropriate  - Identify discharge learning needs (meds, wound care, etc )  - Arrange for interpretive services to assist at discharge as needed  - Refer to Case Management Department for coordinating discharge planning if the patient needs post-hospital services based on physician/advanced practitioner order or complex needs related to functional status, cognitive ability, or social support system  Outcome: Progressing     Problem: Knowledge Deficit  Goal: Patient/family/caregiver demonstrates understanding of disease process, treatment plan, medications, and discharge instructions  Description  Complete learning assessment and assess knowledge base    Interventions:  - Provide teaching at level of understanding  - Provide teaching via preferred learning methods  Outcome: Progressing     Problem: DISCHARGE PLANNING - CARE MANAGEMENT  Goal: Discharge to post-acute care or home with appropriate resources  Description  INTERVENTIONS:  - Conduct assessment to determine patient/family and health care team treatment goals, and need for post-acute services based on payer coverage, community resources, and patient preferences, and barriers to discharge  - Address psychosocial, clinical, and financial barriers to discharge as identified in assessment in conjunction with the patient/family and health care team  - Arrange appropriate level of post-acute services according to patient's   needs and preference and payer coverage in collaboration with the physician and health care team  - Communicate with and update the patient/family, physician, and health care team regarding progress on the discharge plan  - Arrange appropriate transportation to post-acute venues  Discharge home with live in CG and continue ADC 2x/week  Daughter will transport home     Outcome: Progressing

## 2019-10-06 NOTE — ASSESSMENT & PLAN NOTE
· Incidental finding on CT chest   · US RUQ suspects to be a cavernous hemangioma  · Recommend to follow up with triple phase liver CT or MRI outpatient

## 2019-10-06 NOTE — DISCHARGE INSTR - AVS FIRST PAGE
Incidental finding of patent lesion which is suspected to be hemangioma  If clinical warranted please follow up with triple phase CT or MRI  Thank you

## 2019-10-07 ENCOUNTER — TRANSITIONAL CARE MANAGEMENT (OUTPATIENT)
Dept: FAMILY MEDICINE CLINIC | Facility: CLINIC | Age: 84
End: 2019-10-07

## 2019-10-07 NOTE — PROGRESS NOTES
LM for pt RCB (TCM)    Pt needs an appt with Dr Jn Maier before 10/18 but I need to speak with her daughter

## 2019-10-08 LAB
BACTERIA BLD CULT: NORMAL
BACTERIA BLD CULT: NORMAL

## 2019-10-09 ENCOUNTER — EPISODE CHANGES (OUTPATIENT)
Dept: CASE MANAGEMENT | Facility: OTHER | Age: 84
End: 2019-10-09

## 2019-10-09 ENCOUNTER — TELEPHONE (OUTPATIENT)
Dept: NEUROLOGY | Facility: CLINIC | Age: 84
End: 2019-10-09

## 2019-10-09 ENCOUNTER — PATIENT OUTREACH (OUTPATIENT)
Dept: FAMILY MEDICINE CLINIC | Facility: CLINIC | Age: 84
End: 2019-10-09

## 2019-10-09 ENCOUNTER — OFFICE VISIT (OUTPATIENT)
Dept: NEUROLOGY | Facility: CLINIC | Age: 84
End: 2019-10-09
Payer: MEDICARE

## 2019-10-09 VITALS
BODY MASS INDEX: 26.84 KG/M2 | HEIGHT: 66 IN | HEART RATE: 52 BPM | DIASTOLIC BLOOD PRESSURE: 72 MMHG | WEIGHT: 167 LBS | SYSTOLIC BLOOD PRESSURE: 150 MMHG

## 2019-10-09 DIAGNOSIS — I63.412 CEREBROVASCULAR ACCIDENT (CVA) DUE TO EMBOLISM OF LEFT MIDDLE CEREBRAL ARTERY (HCC): ICD-10-CM

## 2019-10-09 DIAGNOSIS — G30.9 ALZHEIMER'S DEMENTIA WITHOUT BEHAVIORAL DISTURBANCE, UNSPECIFIED TIMING OF DEMENTIA ONSET (HCC): Primary | ICD-10-CM

## 2019-10-09 DIAGNOSIS — G40.209 PARTIAL SYMPTOMATIC EPILEPSY WITH COMPLEX PARTIAL SEIZURES, NOT INTRACTABLE, WITHOUT STATUS EPILEPTICUS (HCC): ICD-10-CM

## 2019-10-09 DIAGNOSIS — F02.80 ALZHEIMER'S DEMENTIA WITHOUT BEHAVIORAL DISTURBANCE, UNSPECIFIED TIMING OF DEMENTIA ONSET (HCC): Primary | ICD-10-CM

## 2019-10-09 PROCEDURE — 99214 OFFICE O/P EST MOD 30 MIN: CPT | Performed by: PSYCHIATRY & NEUROLOGY

## 2019-10-09 RX ORDER — LEVETIRACETAM 750 MG/1
TABLET ORAL
Qty: 120 TABLET | Refills: 5 | Status: SHIPPED | OUTPATIENT
Start: 2019-10-09 | End: 2020-03-27

## 2019-10-09 RX ORDER — MAG HYDROX/ALUMINUM HYD/SIMETH 400-400-40
SUSPENSION, ORAL (FINAL DOSE FORM) ORAL DAILY
COMMUNITY

## 2019-10-09 RX ORDER — DIPHENOXYLATE HYDROCHLORIDE AND ATROPINE SULFATE 2.5; .025 MG/1; MG/1
1 TABLET ORAL DAILY
COMMUNITY

## 2019-10-09 NOTE — TELEPHONE ENCOUNTER
Baylee Vallecillo [place] [address] [facility] [place] and states that they don't make starter pack namzaric anymore and namzaric 28-10 mg requires SHANIKA Arredondo [id number]  Alanna Munguia [phone]  [phone]    Dr Arabella Joshi, should I initiate PA or would you want to order alt meds?

## 2019-10-09 NOTE — PROGRESS NOTES
Patient referral received for BPCI ( Sepsis)  Chart review completed  Also attempted to outreach  [facility] and RP[facility]  Waiting return call

## 2019-10-09 NOTE — PROGRESS NOTES
Brianna Flores is a 81 y o  female returns in follow-up today with history of dementia, epilepsy and previous CVA    Assessment:  1  Alzheimer's dementia without behavioral disturbance, unspecified timing of dementia onset ([facility] [place] [address] )    2  Partial symptomatic epilepsy with complex partial seizures, not intractable, without status epilepticus ([facility] [place] [address] )    3  Cerebrovascular accident (CVA) due to embolism of left middle cerebral artery ([facility] [place] [address] )        Plan:  Increase Keppra to 1500 mg b i d  Initiate Namzaric (discontinue Aricept)  Follow-up 6 months    Discussion:  Ruel Chavez has had 2 seizures since her Keppra dose was increased  Have recommended increasing the dose again to 1500 mg twice daily  She will continue with Aricept and will add Namenda as Namzaric (she will discontinue the Aricept as the medication is in the single pill)  If is not covered by her insurance will use the generic Namenda  She will continue with aspirin and I will see her back in follow-up in 6 months      Subjective:    HPI  Ruel Chavez returns in follow-up today accompanied by her caregiver and her daughter  She is initiated the Aricept and tolerated it well  There has not been a significant decline cognitively  She did have 2 seizures since her Keppra dose was increased to 1250 b i d  And she tolerated this increase without adverse effect  EEG demonstrated slowing with sharp wave discharges on the left  She has not had any new stroke symptoms  She recently started an adult  which she seems to enjoy  She was hospitalized recently for urinary sepsis but has improved since treatment        Past Medical History:   Diagnosis Date    Acute renal failure (ARF) ([facility] ) 10/3/2019    Dementia ([facility] )     17 years ago     Epilepsy [facility] )     Essential hypertension 5/17/2019    Hypercholesterolemia     Stroke [facility])        Family History:  Family History   Problem Relation Age of Onset    Cirrhosis Mother     Alcohol abuse Mother     Cancer Father     No Known Problems Daughter     No Known Problems Brother        Past Surgical History:  Past Surgical History:   Procedure Laterality Date    CATARACT EXTRACTION, BILATERAL      3-4 years ago 8/2/2019    HYSTERECTOMY      KNEE SURGERY         Social History:   reports that she has never smoked  She has never used smokeless tobacco  She reports that she drank alcohol  She reports that she does not use drugs  Allergies:  Patient has no known allergies        Current Outpatient Medications:     Ascorbic Acid (VITAMIN C) 1000 MG tablet, Take 1,000 mg by mouth daily, Disp: , Rfl:     aspirin 81 MG tablet, Take 1 tablet (81 mg total) by mouth daily, Disp: 30 tablet, Rfl: 5    B Complex-C (B-COMPLEX WITH VITAMIN C) tablet, Take 1 tablet by mouth daily, Disp: , Rfl:     Calcium-Magnesium-Zinc 333-133-5 MG TABS, Take 1 tablet by mouth daily, Disp: , Rfl:     cephalexin (KEFLEX) 500 mg capsule, Take 1 capsule (500 mg total) by mouth every 12 (twelve) hours for 6 doses, Disp: 6 capsule, Rfl: 0    Cholecalciferol (VITAMIN D3) 5000 units CAPS, Take by mouth daily, Disp: , Rfl:     donepezil (ARICEPT) 10 mg tablet, Take 1 tablet (10 mg total) by mouth daily at bedtime, Disp: 90 tablet, Rfl: 1    gabapentin (NEURONTIN) 400 mg capsule, Take 1 capsule (400 mg total) by mouth 2 (two) times a day, Disp: 180 capsule, Rfl: 2    levETIRAcetam (KEPPRA) 250 mg tablet, Take 1 tablet (250 mg total) by mouth 2 (two) times a day With 2 500 mg Keppra pills twice daily, Disp: 60 tablet, Rfl: 5    levETIRAcetam (KEPPRA) 500 mg tablet, Take 2 tablets (1,000 mg total) by mouth 2 (two) times a day, Disp: 360 tablet, Rfl: 2    lovastatin (MEVACOR) 20 mg tablet, Take 1 tablet (20 mg total) by mouth daily at bedtime, Disp: 90 tablet, Rfl: 3    metoprolol tartrate (LOPRESSOR) 25 mg tablet, Take 1 tablet (25 mg total) by mouth every 12 (twelve) hours Please dispense generic, Disp: 180 tablet, Rfl: 1    multivitamin (THERAGRAN) TABS, Take 1 tablet by mouth daily, Disp: , Rfl:     I have reviewed the past medical, social and family history, current medications, allergies, vitals, review of systems and updated this information as appropriate today     Objective:    Vitals:  Blood pressure 150/72, pulse (!) 52, height 5' 6" (1 676 m), weight 75 8 kg (167 lb)  Physical Exam    Neurological Exam  GENERAL:  Well-developed well-nourished [] in no acute distress  HEENT/NECK: Head is atraumatic normocephalic, neck is supple  NEUROLOGIC:  Mental Status: Awake and alert with expressive greater than receptive aphasia  Short-term memory is poor  Cranial Nerves: Extraocular movements are full  Face reveals a right facial asymmetry  Motor:  Right hemiparesis is noted  Coordination:  She ambulates with a hemiparetic gait pattern with assistance            ROS:    Review of Systems   Constitutional: Negative for appetite change, fatigue and fever  HENT: Negative  Negative for hearing loss, tinnitus, trouble swallowing and voice change  Eyes: Negative  Negative for photophobia and pain  Respiratory: Negative  Negative for shortness of breath  Cardiovascular: Negative  Negative for palpitations  Gastrointestinal: Negative  Negative for nausea and vomiting  Fecal incontinence   Endocrine: Negative  Negative for cold intolerance and heat intolerance  Genitourinary: Positive for enuresis  Negative for dysuria, frequency and urgency  Musculoskeletal: Positive for gait problem  Negative for back pain, myalgias and neck pain  Skin: Negative  Negative for rash  Neurological: Negative for dizziness, tremors, seizures, syncope, facial asymmetry, speech difficulty, weakness, light-headedness, numbness and headaches  Hematological: Negative  Does not bruise/bleed easily  Psychiatric/Behavioral: Positive for agitation (daught associates with her having a UTI) and confusion  Negative for hallucinations and sleep disturbance

## 2019-10-10 NOTE — TELEPHONE ENCOUNTER
Received form requesting additional information for Namzaric PA  This states formulary alternative is for patient to try combination of donepezil and memantine as separate products  Please advise if you would like to order this instead, Namzaric will likely not be covered until this is tried per PA form  If this is not appropriate, please advise why patient is unable to use formulary alternative  Thank you!

## 2019-10-14 DIAGNOSIS — G30.9 ALZHEIMER'S DEMENTIA WITHOUT BEHAVIORAL DISTURBANCE, UNSPECIFIED TIMING OF DEMENTIA ONSET (HCC): ICD-10-CM

## 2019-10-14 DIAGNOSIS — F02.80 ALZHEIMER'S DEMENTIA WITHOUT BEHAVIORAL DISTURBANCE, UNSPECIFIED TIMING OF DEMENTIA ONSET (HCC): ICD-10-CM

## 2019-10-14 RX ORDER — MEMANTINE HYDROCHLORIDE 10 MG/1
10 TABLET ORAL 2 TIMES DAILY
Qty: 60 TABLET | Refills: 3 | Status: SHIPPED | OUTPATIENT
Start: 2019-10-14 | End: 2019-11-14 | Stop reason: SDUPTHER

## 2019-10-14 RX ORDER — DONEPEZIL HYDROCHLORIDE 10 MG/1
10 TABLET, FILM COATED ORAL
Qty: 30 TABLET | Refills: 1 | Status: SHIPPED | OUTPATIENT
Start: 2019-10-14 | End: 2020-02-14

## 2019-10-16 ENCOUNTER — PATIENT OUTREACH (OUTPATIENT)
Dept: FAMILY MEDICINE CLINIC | Facility: CLINIC | Age: 84
End: 2019-10-16

## 2019-10-16 NOTE — PROGRESS NOTES
Spoke to Humphrey Rorycarmelo (adult daughter and RP)  She stated that Montrell Olivares lives on her own not to far away from her  Has a 24 hour live in aide  Also does attend adult day care at [place] [id number] 2 days a week on Tuesdays and Thursdays  She visits her daily  Needs assistance x1 with ADLs  Walks with walker  Also has a wheelchair and shower chair  Allyson Clark takes her to her follow up appointments  Receives her medications at [facility] as well as [employer] mail service  Denied having financial difficulties at this time  Stated she is feeling well  Back to her "normal routine " Stated her vitals have been stable  Reviewed over up coming appointment with PCP on 10/18/19  She stated that she will be attending and has no issues with transportation  Followed up with neurology for her dementia and for her history of epilepsy last week and is up to date with routine labs  Adult comprehensive assessment completed  Introduced her to care management  She is in agreement  Will follow up

## 2019-10-18 ENCOUNTER — OFFICE VISIT (OUTPATIENT)
Dept: FAMILY MEDICINE CLINIC | Facility: CLINIC | Age: 84
End: 2019-10-18
Payer: MEDICARE

## 2019-10-18 ENCOUNTER — APPOINTMENT (OUTPATIENT)
Dept: LAB | Facility: HOSPITAL | Age: 84
End: 2019-10-18
Attending: FAMILY MEDICINE
Payer: MEDICARE

## 2019-10-18 VITALS
OXYGEN SATURATION: 98 % | HEIGHT: 66 IN | SYSTOLIC BLOOD PRESSURE: 116 MMHG | WEIGHT: 168 LBS | BODY MASS INDEX: 27 KG/M2 | HEART RATE: 49 BPM | DIASTOLIC BLOOD PRESSURE: 72 MMHG

## 2019-10-18 DIAGNOSIS — N17.9 ACUTE RENAL FAILURE, UNSPECIFIED ACUTE RENAL FAILURE TYPE (HCC): ICD-10-CM

## 2019-10-18 DIAGNOSIS — D64.9 ANEMIA, UNSPECIFIED TYPE: ICD-10-CM

## 2019-10-18 DIAGNOSIS — K76.9 HEPATIC LESION: ICD-10-CM

## 2019-10-18 DIAGNOSIS — G92.8 TOXIC METABOLIC ENCEPHALOPATHY: ICD-10-CM

## 2019-10-18 DIAGNOSIS — R65.20 SEVERE SEPSIS (HCC): Primary | ICD-10-CM

## 2019-10-18 DIAGNOSIS — E78.5 HYPERLIPIDEMIA, UNSPECIFIED HYPERLIPIDEMIA TYPE: ICD-10-CM

## 2019-10-18 DIAGNOSIS — I10 ESSENTIAL HYPERTENSION: ICD-10-CM

## 2019-10-18 DIAGNOSIS — F03.90 DEMENTIA WITHOUT BEHAVIORAL DISTURBANCE, UNSPECIFIED DEMENTIA TYPE (HCC): ICD-10-CM

## 2019-10-18 DIAGNOSIS — G40.209 PARTIAL SYMPTOMATIC EPILEPSY WITH COMPLEX PARTIAL SEIZURES, NOT INTRACTABLE, WITHOUT STATUS EPILEPTICUS (HCC): ICD-10-CM

## 2019-10-18 DIAGNOSIS — A41.9 SEVERE SEPSIS (HCC): Primary | ICD-10-CM

## 2019-10-18 LAB
ANION GAP SERPL CALCULATED.3IONS-SCNC: 7 MMOL/L (ref 4–13)
BASOPHILS # BLD AUTO: 0.08 THOUSANDS/ΜL (ref 0–0.1)
BASOPHILS NFR BLD AUTO: 1 % (ref 0–1)
BUN SERPL-MCNC: 18 MG/DL (ref 5–25)
CALCIUM SERPL-MCNC: 9.6 MG/DL (ref 8.3–10.1)
CHLORIDE SERPL-SCNC: 106 MMOL/L (ref 100–108)
CO2 SERPL-SCNC: 32 MMOL/L (ref 21–32)
CREAT SERPL-MCNC: 0.78 MG/DL (ref 0.6–1.3)
EOSINOPHIL # BLD AUTO: 0 THOUSAND/ΜL (ref 0–0.61)
EOSINOPHIL NFR BLD AUTO: 0 % (ref 0–6)
ERYTHROCYTE [DISTWIDTH] IN BLOOD BY AUTOMATED COUNT: 16.4 % (ref 11.6–15.1)
GFR SERPL CREATININE-BSD FRML MDRD: 71 ML/MIN/1.73SQ M
GLUCOSE SERPL-MCNC: 80 MG/DL (ref 65–140)
HCT VFR BLD AUTO: 38.4 % (ref 34.8–46.1)
HGB BLD-MCNC: 11.3 G/DL (ref 11.5–15.4)
IMM GRANULOCYTES # BLD AUTO: 0.04 THOUSAND/UL (ref 0–0.2)
IMM GRANULOCYTES NFR BLD AUTO: 0 % (ref 0–2)
IRON SERPL-MCNC: 121 UG/DL (ref 50–170)
LYMPHOCYTES # BLD AUTO: 2.05 THOUSANDS/ΜL (ref 0.6–4.47)
LYMPHOCYTES NFR BLD AUTO: 19 % (ref 14–44)
MCH RBC QN AUTO: 21.5 PG (ref 26.8–34.3)
MCHC RBC AUTO-ENTMCNC: 29.4 G/DL (ref 31.4–37.4)
MCV RBC AUTO: 73 FL (ref 82–98)
MONOCYTES # BLD AUTO: 0.9 THOUSAND/ΜL (ref 0.17–1.22)
MONOCYTES NFR BLD AUTO: 8 % (ref 4–12)
NEUTROPHILS # BLD AUTO: 7.8 THOUSANDS/ΜL (ref 1.85–7.62)
NEUTS SEG NFR BLD AUTO: 72 % (ref 43–75)
NRBC BLD AUTO-RTO: 0 /100 WBCS
PLATELET # BLD AUTO: 407 THOUSANDS/UL (ref 149–390)
PMV BLD AUTO: 10.7 FL (ref 8.9–12.7)
POTASSIUM SERPL-SCNC: 3.8 MMOL/L (ref 3.5–5.3)
RBC # BLD AUTO: 5.25 MILLION/UL (ref 3.81–5.12)
SODIUM SERPL-SCNC: 145 MMOL/L (ref 136–145)
WBC # BLD AUTO: 10.87 THOUSAND/UL (ref 4.31–10.16)

## 2019-10-18 PROCEDURE — 83540 ASSAY OF IRON: CPT

## 2019-10-18 PROCEDURE — 36415 COLL VENOUS BLD VENIPUNCTURE: CPT

## 2019-10-18 PROCEDURE — 80048 BASIC METABOLIC PNL TOTAL CA: CPT

## 2019-10-18 PROCEDURE — 85025 COMPLETE CBC W/AUTO DIFF WBC: CPT

## 2019-10-18 PROCEDURE — 99215 OFFICE O/P EST HI 40 MIN: CPT | Performed by: FAMILY MEDICINE

## 2019-10-18 NOTE — PROGRESS NOTES
Assessment/Plan:  Return Visit with next scheduled appointment next month  40 minutes was spent evaluating this patient's problem and reviewing her case with her daughter  Diagnoses and all orders for this visit:    Severe sepsis ([address] )    Hepatic lesion  -     CT abdomen w wo contrast; Future    Toxic metabolic encephalopathy    Acute renal failure, unspecified acute renal failure type ([address] )  -     Basic metabolic panel; Future    Anemia, unspecified type  -     CBC and differential; Future  -     Iron; Future    Dementia without behavioral disturbance, unspecified dementia type (HCC)    Partial symptomatic epilepsy with complex partial seizures, not intractable, without status epilepticus ([address] )    Essential hypertension    Hyperlipidemia, unspecified hyperlipidemia type        Dementia without behavioral disturbance (HCC)  Continue Aricept and Namenda    Essential hypertension  Continue metoprolol tartrate 25 mg b i d  Hyperlipidemia  Continue lovastatin 20 mg daily    Partial symptomatic epilepsy with complex partial seizures, not intractable, without status epilepticus ([facility] )  Continue Keppra and gabapentin        Subjective:      Patient ID: Barrie Cockayne is a 81 y o  female  Patient comes in for hospital follow-up  She had severe urosepsis  She had mental status change, acute renal failure and worsening anemia  She also was found to have a lesion on her liver while in the hospital   Daughter says she is now back to baseline  The following portions of the patient's history were reviewed and updated as appropriate:   She has a past medical history of Acute renal failure (ARF) ([address] ) (10/3/2019), Dementia ([address] ), Epilepsy ([facility] ), Essential hypertension (5/17/2019), Hypercholesterolemia, and Stroke ([facility] )  ,  does not have any pertinent problems on file  ,   has a past surgical history that includes Hysterectomy; Knee surgery; and Cataract extraction, bilateral ,  family history includes Alcohol abuse in her mother; Cancer in her father; Cirrhosis in her mother; No Known Problems in her brother and daughter  ,   reports that she has never smoked  She has never used smokeless tobacco  She reports that she drank alcohol  She reports that she does not use drugs  ,  has No Known Allergies     Current Outpatient Medications   Medication Sig Dispense Refill    Ascorbic Acid (VITAMIN C) 1000 MG tablet Take 1,000 mg by mouth daily      aspirin 81 MG tablet Take 1 tablet (81 mg total) by mouth daily 30 tablet 5    B Complex-C (B-COMPLEX WITH VITAMIN C) tablet Take 1 tablet by mouth daily      Calcium-Magnesium-Zinc 333-133-5 MG TABS Take 1 tablet by mouth daily      Cholecalciferol (VITAMIN D3) 5000 units CAPS Take by mouth daily      donepezil (ARICEPT) 10 mg tablet Take 1 tablet (10 mg total) by mouth daily at bedtime 30 tablet 1    gabapentin (NEURONTIN) 400 mg capsule Take 1 capsule (400 mg total) by mouth 2 (two) times a day 180 capsule 2    levETIRAcetam (KEPPRA) 750 mg tablet Two PO b i d  120 tablet 5    lovastatin (MEVACOR) 20 mg tablet Take 1 tablet (20 mg total) by mouth daily at bedtime 90 tablet 3    memantine (NAMENDA) 10 mg tablet Take 1 tablet (10 mg total) by mouth 2 (two) times a day 60 tablet 3    metoprolol tartrate (LOPRESSOR) 25 mg tablet Take 1 tablet (25 mg total) by mouth every 12 (twelve) hours Please dispense generic 180 tablet 1    multivitamin (THERAGRAN) TABS Take 1 tablet by mouth daily       No current facility-administered medications for this visit  Review of Systems   Constitutional: Positive for fatigue  HENT: Negative  Respiratory: Negative  Cardiovascular: Negative  Gastrointestinal: Negative  Objective:  Vitals:    10/18/19 0942   BP: 116/72   Pulse: (!) 49   SpO2: 98%   Weight: 76 2 kg (168 lb)   Height: 5' 6" (1 676 m)     Body mass index is 27 12 kg/m²  Physical Exam   Constitutional: She appears well-developed     HENT:   Head: Normocephalic and atraumatic  Right Ear: Tympanic membrane and external ear normal    Left Ear: Tympanic membrane and external ear normal    Mouth/Throat: Oropharynx is clear and moist    Eyes: Pupils are equal, round, and reactive to light  EOM are normal    Neck: Neck supple  Cardiovascular: Normal rate, regular rhythm and normal heart sounds  Pulmonary/Chest: Effort normal and breath sounds normal    Abdominal: Soft  Musculoskeletal: Normal range of motion  Ambulates with a walker   Lymphadenopathy:     She has no cervical adenopathy  Neurological: She is alert  Responds appropriately to commands essentially aphasic   Skin: Skin is warm and dry  Psychiatric: She has a normal mood and affect

## 2019-10-29 ENCOUNTER — OFFICE VISIT (OUTPATIENT)
Dept: URGENT CARE | Facility: HOSPITAL | Age: 84
End: 2019-10-29
Payer: MEDICARE

## 2019-10-29 ENCOUNTER — HOSPITAL ENCOUNTER (EMERGENCY)
Facility: HOSPITAL | Age: 84
Discharge: HOME | End: 2019-10-29
Attending: EMERGENCY MEDICINE | Admitting: EMERGENCY MEDICINE
Payer: MEDICARE

## 2019-10-29 VITALS
DIASTOLIC BLOOD PRESSURE: 75 MMHG | BODY MASS INDEX: 27.96 KG/M2 | TEMPERATURE: 98.3 F | SYSTOLIC BLOOD PRESSURE: 188 MMHG | WEIGHT: 168 LBS | HEART RATE: 63 BPM | RESPIRATION RATE: 18 BRPM | OXYGEN SATURATION: 96 %

## 2019-10-29 VITALS
WEIGHT: 168 LBS | RESPIRATION RATE: 18 BRPM | HEIGHT: 65 IN | TEMPERATURE: 98.2 F | DIASTOLIC BLOOD PRESSURE: 66 MMHG | BODY MASS INDEX: 27.99 KG/M2 | OXYGEN SATURATION: 96 % | HEART RATE: 61 BPM | SYSTOLIC BLOOD PRESSURE: 172 MMHG

## 2019-10-29 DIAGNOSIS — R32 URINARY INCONTINENCE, UNSPECIFIED TYPE: Primary | ICD-10-CM

## 2019-10-29 DIAGNOSIS — N39.0 UTI (URINARY TRACT INFECTION): Primary | ICD-10-CM

## 2019-10-29 DIAGNOSIS — R53.83 FATIGUE, UNSPECIFIED TYPE: ICD-10-CM

## 2019-10-29 DIAGNOSIS — G40.209 PARTIAL SYMPTOMATIC EPILEPSY WITH COMPLEX PARTIAL SEIZURES, NOT INTRACTABLE, WITHOUT STATUS EPILEPTICUS (HCC): Primary | ICD-10-CM

## 2019-10-29 LAB
ALBUMIN SERPL BCP-MCNC: 3.8 G/DL (ref 3.5–5)
ALP SERPL-CCNC: 136 U/L (ref 46–116)
ALT SERPL W P-5'-P-CCNC: 30 U/L (ref 12–78)
ANION GAP SERPL CALCULATED.3IONS-SCNC: 6 MMOL/L (ref 4–13)
AST SERPL W P-5'-P-CCNC: 27 U/L (ref 5–45)
BACTERIA UR QL AUTO: ABNORMAL /HPF
BASOPHILS # BLD AUTO: 0.09 THOUSANDS/ΜL (ref 0–0.1)
BASOPHILS NFR BLD AUTO: 1 % (ref 0–1)
BILIRUB SERPL-MCNC: 0.5 MG/DL (ref 0.2–1)
BILIRUB UR QL STRIP: NEGATIVE
BUN SERPL-MCNC: 23 MG/DL (ref 5–25)
CALCIUM SERPL-MCNC: 9.6 MG/DL (ref 8.3–10.1)
CHLORIDE SERPL-SCNC: 105 MMOL/L (ref 100–108)
CLARITY UR: ABNORMAL
CO2 SERPL-SCNC: 31 MMOL/L (ref 21–32)
COLOR UR: YELLOW
CREAT SERPL-MCNC: 0.96 MG/DL (ref 0.6–1.3)
EOSINOPHIL # BLD AUTO: 0 THOUSAND/ΜL (ref 0–0.61)
EOSINOPHIL NFR BLD AUTO: 0 % (ref 0–6)
ERYTHROCYTE [DISTWIDTH] IN BLOOD BY AUTOMATED COUNT: 15.6 % (ref 11.6–15.1)
GFR SERPL CREATININE-BSD FRML MDRD: 55 ML/MIN/1.73SQ M
GLUCOSE SERPL-MCNC: 104 MG/DL (ref 65–140)
GLUCOSE UR STRIP-MCNC: NEGATIVE MG/DL
HCT VFR BLD AUTO: 39.5 % (ref 34.8–46.1)
HGB BLD-MCNC: 11.7 G/DL (ref 11.5–15.4)
HGB UR QL STRIP.AUTO: NEGATIVE
IMM GRANULOCYTES # BLD AUTO: 0.03 THOUSAND/UL (ref 0–0.2)
IMM GRANULOCYTES NFR BLD AUTO: 0 % (ref 0–2)
KETONES UR STRIP-MCNC: ABNORMAL MG/DL
LEUKOCYTE ESTERASE UR QL STRIP: ABNORMAL
LYMPHOCYTES # BLD AUTO: 2.6 THOUSANDS/ΜL (ref 0.6–4.47)
LYMPHOCYTES NFR BLD AUTO: 22 % (ref 14–44)
MCH RBC QN AUTO: 21.7 PG (ref 26.8–34.3)
MCHC RBC AUTO-ENTMCNC: 29.6 G/DL (ref 31.4–37.4)
MCV RBC AUTO: 73 FL (ref 82–98)
MONOCYTES # BLD AUTO: 0.93 THOUSAND/ΜL (ref 0.17–1.22)
MONOCYTES NFR BLD AUTO: 8 % (ref 4–12)
NEUTROPHILS # BLD AUTO: 8.01 THOUSANDS/ΜL (ref 1.85–7.62)
NEUTS SEG NFR BLD AUTO: 69 % (ref 43–75)
NITRITE UR QL STRIP: NEGATIVE
NON-SQ EPI CELLS URNS QL MICRO: ABNORMAL /HPF
NRBC BLD AUTO-RTO: 0 /100 WBCS
PH UR STRIP.AUTO: 6 [PH]
PLATELET # BLD AUTO: 407 THOUSANDS/UL (ref 149–390)
PMV BLD AUTO: 9.8 FL (ref 8.9–12.7)
POTASSIUM SERPL-SCNC: 4.6 MMOL/L (ref 3.5–5.3)
PROT SERPL-MCNC: 7.8 G/DL (ref 6.4–8.2)
PROT UR STRIP-MCNC: ABNORMAL MG/DL
RBC # BLD AUTO: 5.4 MILLION/UL (ref 3.81–5.12)
RBC #/AREA URNS AUTO: ABNORMAL /HPF
SL AMB  POCT GLUCOSE, UA: NEGATIVE
SL AMB LEUKOCYTE ESTERASE,UA: ABNORMAL
SL AMB POCT BILIRUBIN,UA: NEGATIVE
SL AMB POCT BLOOD,UA: NEGATIVE
SL AMB POCT CLARITY,UA: ABNORMAL
SL AMB POCT COLOR,UA: ABNORMAL
SL AMB POCT KETONES,UA: NEGATIVE
SL AMB POCT NITRITE,UA: NEGATIVE
SL AMB POCT PH,UA: 6
SL AMB POCT SPECIFIC GRAVITY,UA: 1.02
SL AMB POCT URINE PROTEIN: 30
SL AMB POCT UROBILINOGEN: 0.2
SODIUM SERPL-SCNC: 142 MMOL/L (ref 136–145)
SP GR UR STRIP.AUTO: 1.02 (ref 1–1.03)
UROBILINOGEN UR QL STRIP.AUTO: 0.2 E.U./DL
WBC # BLD AUTO: 11.66 THOUSAND/UL (ref 4.31–10.16)
WBC #/AREA URNS AUTO: ABNORMAL /HPF

## 2019-10-29 PROCEDURE — 81001 URINALYSIS AUTO W/SCOPE: CPT | Performed by: PHYSICIAN ASSISTANT

## 2019-10-29 PROCEDURE — 85025 COMPLETE CBC W/AUTO DIFF WBC: CPT | Performed by: PHYSICIAN ASSISTANT

## 2019-10-29 PROCEDURE — 87077 CULTURE AEROBIC IDENTIFY: CPT | Performed by: NURSE PRACTITIONER

## 2019-10-29 PROCEDURE — 96365 THER/PROPH/DIAG IV INF INIT: CPT

## 2019-10-29 PROCEDURE — 99283 EMERGENCY DEPT VISIT LOW MDM: CPT

## 2019-10-29 PROCEDURE — 99284 EMERGENCY DEPT VISIT MOD MDM: CPT | Performed by: PHYSICIAN ASSISTANT

## 2019-10-29 PROCEDURE — 87186 SC STD MICRODIL/AGAR DIL: CPT | Performed by: NURSE PRACTITIONER

## 2019-10-29 PROCEDURE — 36415 COLL VENOUS BLD VENIPUNCTURE: CPT | Performed by: PHYSICIAN ASSISTANT

## 2019-10-29 PROCEDURE — 87086 URINE CULTURE/COLONY COUNT: CPT | Performed by: NURSE PRACTITIONER

## 2019-10-29 PROCEDURE — 80053 COMPREHEN METABOLIC PANEL: CPT | Performed by: PHYSICIAN ASSISTANT

## 2019-10-29 PROCEDURE — 81002 URINALYSIS NONAUTO W/O SCOPE: CPT | Performed by: NURSE PRACTITIONER

## 2019-10-29 RX ORDER — DIVALPROEX SODIUM 250 MG/1
250 TABLET, DELAYED RELEASE ORAL EVERY 12 HOURS SCHEDULED
Qty: 60 TABLET | Refills: 1 | Status: SHIPPED | OUTPATIENT
Start: 2019-10-29 | End: 2019-11-08 | Stop reason: SDUPTHER

## 2019-10-29 RX ORDER — CEPHALEXIN 500 MG/1
500 CAPSULE ORAL EVERY 6 HOURS SCHEDULED
Qty: 40 CAPSULE | Refills: 0 | Status: SHIPPED | OUTPATIENT
Start: 2019-10-29 | End: 2019-11-08

## 2019-10-29 RX ADMIN — CEFTRIAXONE SODIUM 1000 MG: 10 INJECTION, POWDER, FOR SOLUTION INTRAVENOUS at 22:13

## 2019-10-29 RX ADMIN — SODIUM CHLORIDE 1000 ML: 0.9 INJECTION, SOLUTION INTRAVENOUS at 22:10

## 2019-10-29 NOTE — PROGRESS NOTES
[facility]        NAME: Veronique Wilcox is a 81 y o  female  : 1935    MRN: [id number]  DATE: 2019  TIME: 6:18 PM    Assessment and Plan   Urinary incontinence, unspecified type [R32]  1  Urinary incontinence, unspecified type  POCT urine dip    Urine culture    Transfer to other facility   2  Fatigue, unspecified type  Transfer to other facility         Patient Instructions     Proceed to  ER for further evaluation due to recent history of urosepsis, ARF, and encephalopathy  Patient and daughter agreeable to plan of care  Patient's daughter to drive patient via private vehicle to ER  Chief Complaint     Chief Complaint   Patient presents with    Fatigue     Pt had a UTI was in the hospital Oct  3rd  Daughter afraid her mom might have it again  History of Present Illness       Patient is an 21-year-old female who presents with her daughter to the clinic today  Patient has a history of Alzheimer's and patient's daughter supplied history  Patient's daughter states that she noted the patient has been very tired over the past 3 days  Also states she has been more quiet than usual   Patient's daughter states that patient had an episode of incontinence which differs from her baseline  Denies any fever  Patient's daughter states the patient offers no other complaints  Patient's daughter states that approximately 1 month ago patient was hospitalized due to urosepsis and also presented with fatigue  Review of Systems   Review of Systems   Unable to perform ROS: Dementia (patient's daughter gave history)   Constitutional: Positive for fatigue  Negative for chills, diaphoresis and fever  Respiratory: Negative for cough, chest tightness and shortness of breath  Cardiovascular: Negative for chest pain  Gastrointestinal: Negative for abdominal pain, constipation, diarrhea, nausea and vomiting     Genitourinary: Negative for decreased urine volume, difficulty urinating, dysuria, flank pain, frequency, hematuria, pelvic pain, urgency, vaginal bleeding and vaginal discharge  Musculoskeletal: Negative for back pain, joint swelling, myalgias, neck pain and neck stiffness  Skin: Negative for rash  Neurological: Negative for dizziness, weakness, numbness and headaches           Current Medications       Current Outpatient Medications:     Ascorbic Acid (VITAMIN C) 1000 MG tablet, Take 1,000 mg by mouth daily, Disp: , Rfl:     aspirin 81 MG tablet, Take 1 tablet (81 mg total) by mouth daily, Disp: 30 tablet, Rfl: 5    B Complex-C (B-COMPLEX WITH VITAMIN C) tablet, Take 1 tablet by mouth daily, Disp: , Rfl:     Calcium-Magnesium-Zinc 333-133-5 MG TABS, Take 1 tablet by mouth daily, Disp: , Rfl:     Cholecalciferol (VITAMIN D3) 5000 units CAPS, Take by mouth daily, Disp: , Rfl:     divalproex sodium (DEPAKOTE) 250 mg EC tablet, Take 1 tablet (250 mg total) by mouth every 12 (twelve) hours, Disp: 60 tablet, Rfl: 1    donepezil (ARICEPT) 10 mg tablet, Take 1 tablet (10 mg total) by mouth daily at bedtime, Disp: 30 tablet, Rfl: 1    gabapentin (NEURONTIN) 400 mg capsule, Take 1 capsule (400 mg total) by mouth 2 (two) times a day, Disp: 180 capsule, Rfl: 2    levETIRAcetam (KEPPRA) 750 mg tablet, Two PO b i d , Disp: 120 tablet, Rfl: 5    lovastatin (MEVACOR) 20 mg tablet, Take 1 tablet (20 mg total) by mouth daily at bedtime, Disp: 90 tablet, Rfl: 3    memantine (NAMENDA) 10 mg tablet, Take 1 tablet (10 mg total) by mouth 2 (two) times a day, Disp: 60 tablet, Rfl: 3    metoprolol tartrate (LOPRESSOR) 25 mg tablet, Take 1 tablet (25 mg total) by mouth every 12 (twelve) hours Please dispense generic, Disp: 180 tablet, Rfl: 1    multivitamin (THERAGRAN) TABS, Take 1 tablet by mouth daily, Disp: , Rfl:     Current Allergies     Allergies as of 10/29/2019    (No Known Allergies)            The following portions of the patient's history were reviewed and updated as appropriate: allergies, current medications, past family history, past medical history, past social history, past surgical history and problem list      Past Medical History:   Diagnosis Date    Acute renal failure (ARF) ([facility] ) 10/3/2019    Dementia ([facility] )     17 years ago     Epilepsy ([facility] )     Essential hypertension 5/17/2019    Hypercholesterolemia     Stroke [facility])        Past Surgical History:   Procedure Laterality Date    CATARACT EXTRACTION, BILATERAL      3-4 years ago 8/2/2019    HYSTERECTOMY      KNEE SURGERY         Family History   Problem Relation Age of Onset    Cirrhosis Mother     Alcohol abuse Mother     Cancer Father     No Known Problems Daughter     No Known Problems Brother          Medications have been verified  Objective   BP (!) 172/66   Pulse 61   Temp 98 2 °F (36 8 °C)   Resp 18   Ht 5' 5" (1 651 m)   Wt 76 2 kg (168 lb)   SpO2 96%   BMI 27 96 kg/m²        Physical Exam     Physical Exam   Constitutional: She appears well-developed and well-nourished  No distress  HENT:   Head: Normocephalic and atraumatic  Cardiovascular: Normal rate, regular rhythm, S1 normal, S2 normal, normal heart sounds, intact distal pulses and normal pulses  Pulmonary/Chest: Effort normal and breath sounds normal    Abdominal: Soft  Normal appearance and bowel sounds are normal  She exhibits no distension and no mass  There is no tenderness  There is no rigidity, no rebound, no guarding, no CVA tenderness, no tenderness at McBurney's point and negative Murillo's sign  Neurological: She is alert  She is disoriented (oriented to person only at baseline  )  Skin: Skin is warm and dry  Capillary refill takes less than 2 seconds  She is not diaphoretic

## 2019-10-30 NOTE — ED PROVIDER NOTES
History  Chief Complaint   Patient presents with    Possible UTI     Patient brought in family states patient was dx with UTI today at [facility]  Daughter states patient has been tired and quiet x a few days  Patient had uti 10/3/19 and had same symptoms  patient denies pain  Patient is an 42-year-old female with history of dementia that presents emergency department accompanied by her daughter  Patient's daughter states that the patient has been more quiet for the last several days  She states that this is typically how she behaves when she has urinary tract infection  She was seen in a local urgent care and found to have evidence of a urinary tract infection and a urinalysis  Patient has been afebrile  She has not been nauseated or vomiting  Prior to Admission Medications   Prescriptions Last Dose Informant Patient Reported? Taking? Ascorbic Acid (VITAMIN C) 1000 MG tablet   Yes Yes   Sig: Take 1,000 mg by mouth daily   B Complex-C (B-COMPLEX WITH VITAMIN C) tablet  Care Giver Yes Yes   Sig: Take 1 tablet by mouth daily   Calcium-Magnesium-Zinc 333-133-5 MG TABS  Care Giver Yes Yes   Sig: Take 1 tablet by mouth daily   Cholecalciferol (VITAMIN D3) 5000 units CAPS  Self Yes Yes   Sig: Take by mouth daily   aspirin 81 MG tablet   No Yes   Sig: Take 1 tablet (81 mg total) by mouth daily   divalproex sodium (DEPAKOTE) 250 mg EC tablet   No Yes   Sig: Take 1 tablet (250 mg total) by mouth every 12 (twelve) hours   donepezil (ARICEPT) 10 mg tablet   No Yes   Sig: Take 1 tablet (10 mg total) by mouth daily at bedtime   gabapentin (NEURONTIN) 400 mg capsule   No Yes   Sig: Take 1 capsule (400 mg total) by mouth 2 (two) times a day   levETIRAcetam (KEPPRA) 750 mg tablet   No Yes   Sig: Two PO b i d  Patient taking differently:  Total of 2500 mg daily   lovastatin (MEVACOR) 20 mg tablet   No Yes   Sig: Take 1 tablet (20 mg total) by mouth daily at bedtime   memantine (NAMENDA) 10 mg tablet No Yes   Sig: Take 1 tablet (10 mg total) by mouth 2 (two) times a day   metoprolol tartrate (LOPRESSOR) 25 mg tablet   No Yes   Sig: Take 1 tablet (25 mg total) by mouth every 12 (twelve) hours Please dispense generic   multivitamin (THERAGRAN) TABS   Yes Yes   Sig: Take 1 tablet by mouth daily      Facility-Administered Medications: None       Past Medical History:   Diagnosis Date    Acute renal failure (ARF) ([place] [address] ) 10/3/2019    Dementia ([place] [address] )     17 years ago     Epilepsy ([place] [address] )     Essential hypertension 5/17/2019    Hypercholesterolemia     Stroke ([place] [address] )     UTI (urinary tract infection) 10/29/2019       Past Surgical History:   Procedure Laterality Date    CATARACT EXTRACTION, BILATERAL      3-4 years ago 8/2/2019    HYSTERECTOMY      KNEE SURGERY         Family History   Problem Relation Age of Onset    Cirrhosis Mother     Alcohol abuse Mother     Cancer Father     No Known Problems Daughter     No Known Problems Brother      I have reviewed and agree with the history as documented  Social History     Tobacco Use    Smoking status: Never Smoker    Smokeless tobacco: Never Used   Substance Use Topics    Alcohol use: Not Currently     Frequency: Never    Drug use: Never        Review of Systems   Unable to perform ROS: Dementia       Physical Exam  Physical Exam   Constitutional: She appears well-developed and well-nourished  HENT:   Head: Normocephalic and atraumatic  Right Ear: External ear normal    Left Ear: External ear normal    Nose: Nose normal    Mouth/Throat: Oropharynx is clear and moist    Eyes: Pupils are equal, round, and reactive to light  Conjunctivae and EOM are normal    Neck: Normal range of motion  Cardiovascular: Normal rate, regular rhythm and normal heart sounds  Pulmonary/Chest: Effort normal and breath sounds normal    Abdominal: Soft  Bowel sounds are normal  There is no CVA tenderness  Neurological: She is alert  Skin: Skin is warm   Capillary refill takes less than 2 seconds  Vitals reviewed        Vital Signs  ED Triage Vitals [10/29/19 1917]   Temperature Pulse Respirations Blood Pressure SpO2   98 3 °F (36 8 °C) 63 18 (!) 188/75 96 %      Temp Source Heart Rate Source Patient Position - Orthostatic VS BP Location FiO2 (%)   Oral Monitor Sitting Left arm --      Pain Score       --           Vitals:    10/29/19 1917   BP: (!) 188/75   Pulse: 63   Patient Position - Orthostatic VS: Sitting         Visual Acuity      ED Medications  Medications   sodium chloride 0 9 % bolus 1,000 mL (0 mL Intravenous Stopped 10/29/19 2339)   ceftriaxone (ROCEPHIN) 1 g/50 mL in dextrose IVPB (0 mg Intravenous Stopped 10/29/19 2339)       Diagnostic Studies  Results Reviewed     Procedure Component Value Units Date/Time    Urine Microscopic [[id number]]  (Abnormal) Collected:  10/29/19 2054    Lab Status:  Final result Specimen:  Urine, Other Updated:  10/29/19 2118     RBC, UA None Seen /hpf      WBC, UA 4-10 /hpf      Epithelial Cells Occasional /hpf      Bacteria, UA Innumerable /hpf     Comprehensive metabolic panel [[id number]]  (Abnormal) Collected:  10/29/19 2053    Lab Status:  Final result Specimen:  Blood from Arm, Right Updated:  10/29/19 2116     Sodium 142 mmol/L      Potassium 4 6 mmol/L      Chloride 105 mmol/L      CO2 31 mmol/L      ANION GAP 6 mmol/L      BUN 23 mg/dL      Creatinine 0 96 mg/dL      Glucose 104 mg/dL      Calcium 9 6 mg/dL      AST 27 U/L      ALT 30 U/L      Alkaline Phosphatase 136 U/L      Total Protein 7 8 g/dL      Albumin 3 8 g/dL      Total Bilirubin 0 50 mg/dL      eGFR 55 ml/min/1 73sq m     Narrative:       Michelle guidelines for Chronic Kidney Disease (CKD):     Stage 1 with normal or high GFR (GFR > 90 mL/min/1 73 square meters)    Stage 2 Mild CKD (GFR = 60-89 mL/min/1 73 square meters)    Stage 3A Moderate CKD (GFR = 45-59 mL/min/1 73 square meters)    Stage 3B Moderate CKD (GFR = 30-44 mL/min/1 73 square meters)    Stage 4 Severe CKD (GFR = 15-29 mL/min/1 73 square meters)    Stage 5 End Stage CKD (GFR <15 mL/min/1 73 square meters)  Note: GFR calculation is accurate only with a steady state creatinine    CBC and differential [[id number]]  (Abnormal) Collected:  10/29/19 2053    Lab Status:  Final result Specimen:  Blood from Arm, Right Updated:  10/29/19 2101     WBC 11 66 Thousand/uL      RBC 5 40 Million/uL      Hemoglobin 11 7 g/dL      Hematocrit 39 5 %      MCV 73 fL      MCH 21 7 pg      MCHC 29 6 g/dL      RDW 15 6 %      MPV 9 8 fL      Platelets 697 Thousands/uL      nRBC 0 /100 WBCs      Neutrophils Relative 69 %      Immat GRANS % 0 %      Lymphocytes Relative 22 %      Monocytes Relative 8 %      Eosinophils Relative 0 %      Basophils Relative 1 %      Neutrophils Absolute 8 01 Thousands/µL      Immature Grans Absolute 0 03 Thousand/uL      Lymphocytes Absolute 2 60 Thousands/µL      Monocytes Absolute 0 93 Thousand/µL      Eosinophils Absolute 0 00 Thousand/µL      Basophils Absolute 0 09 Thousands/µL     UA w Reflex to Microscopic w Reflex to Culture [[id number]]  (Abnormal) Collected:  10/29/19 2054    Lab Status:  Final result Specimen:  Urine, Other Updated:  10/29/19 2100     Color, UA Yellow     Clarity, UA Cloudy     Specific Gravity, UA 1 025     pH, UA 6 0     Leukocytes, UA Small     Nitrite, UA Negative     Protein, UA Trace mg/dl      Glucose, UA Negative mg/dl      Ketones, UA Trace mg/dl      Urobilinogen, UA 0 2 E U /dl      Bilirubin, UA Negative     Blood, UA Negative                 No orders to display              Procedures  Procedures       ED Course                               MDM  Number of Diagnoses or Management Options  UTI (urinary tract infection):   Diagnosis management comments: Patient is an 30-year-old female presents emergency department with symptoms of urinary tract infection  Urinalysis does demonstrate evidence of urinary tract infection    Lab evaluation does not show any evidence of sepsis  Patient received IV fluids, IV Rocephin  She was transition to oral Keflex  Patient is follow up with her family physician  She is continue the Keflex  Return parameters were discussed  Patient stable for discharge  Amount and/or Complexity of Data Reviewed  Clinical lab tests: ordered and reviewed  Obtain history from someone other than the patient: yes  Review and summarize past medical records: yes    Risk of Complications, Morbidity, and/or Mortality  Presenting problems: moderate  Diagnostic procedures: low  Management options: moderate    Patient Progress  Patient progress: stable      Disposition  Final diagnoses:   UTI (urinary tract infection)     Time reflects when diagnosis was documented in both MDM as applicable and the Disposition within this note     Time User Action Codes Description Comment    10/29/2019 10:05 PM Nohemy Shows Add [N39 0] UTI (urinary tract infection)       ED Disposition     ED Disposition Condition Date/Time Comment    Discharge Good Tue Oct 29, 2019 10:05 PM Faye Perry discharge to home/self care              Follow-up Information     Follow up With Specialties Details Why Contact Info Additional Information    Vicki Champagne MD Family Medicine Schedule an appointment as soon as possible for a visit   [address] [place] [address] 218 [address] [place] [address] For Urology [facility] Urology Schedule an appointment as soon as possible for a visit   [address]  [address] [facility] [address]  [address] [place] [address] For Urology [address]  [address] [place], [place], [address]          Discharge Medication List as of 10/29/2019 10:12 PM      START taking these medications    Details   cephalexin (KEFLEX) 500 mg capsule Take 1 capsule (500 mg total) by mouth every 6 (six) hours for 10 days, Starting Tue 10/29/2019, Until Fri 11/8/2019, Print CONTINUE these medications which have NOT CHANGED    Details   Ascorbic Acid (VITAMIN C) 1000 MG tablet Take 1,000 mg by mouth daily, Historical Med      aspirin 81 MG tablet Take 1 tablet (81 mg total) by mouth daily, Starting Fri 8/2/2019, No Print      B Complex-C (B-COMPLEX WITH VITAMIN C) tablet Take 1 tablet by mouth daily, Historical Med      Calcium-Magnesium-Zinc 333-133-5 MG TABS Take 1 tablet by mouth daily, Historical Med      Cholecalciferol (VITAMIN D3) 5000 units CAPS Take by mouth daily, Historical Med      divalproex sodium (DEPAKOTE) 250 mg EC tablet Take 1 tablet (250 mg total) by mouth every 12 (twelve) hours, Starting Tue 10/29/2019, Normal      donepezil (ARICEPT) 10 mg tablet Take 1 tablet (10 mg total) by mouth daily at bedtime, Starting Mon 10/14/2019, Normal      gabapentin (NEURONTIN) 400 mg capsule Take 1 capsule (400 mg total) by mouth 2 (two) times a day, Starting Mon 7/22/2019, Normal      levETIRAcetam (KEPPRA) 750 mg tablet Two PO b i d , Normal      lovastatin (MEVACOR) 20 mg tablet Take 1 tablet (20 mg total) by mouth daily at bedtime, Starting Mon 7/29/2019, Normal      memantine (NAMENDA) 10 mg tablet Take 1 tablet (10 mg total) by mouth 2 (two) times a day, Starting Mon 10/14/2019, Normal      metoprolol tartrate (LOPRESSOR) 25 mg tablet Take 1 tablet (25 mg total) by mouth every 12 (twelve) hours Please dispense generic, Starting Tue 7/23/2019, Normal      multivitamin (THERAGRAN) TABS Take 1 tablet by mouth daily, Historical Med           No discharge procedures on file      ED Provider  Electronically Signed by           Elida Thomas PA-C  10/30/19 6434

## 2019-11-01 ENCOUNTER — HOSPITAL ENCOUNTER (OUTPATIENT)
Dept: CT IMAGING | Facility: HOSPITAL | Age: 84
Discharge: HOME | End: 2019-11-01
Attending: FAMILY MEDICINE
Payer: MEDICARE

## 2019-11-01 DIAGNOSIS — K76.9 HEPATIC LESION: ICD-10-CM

## 2019-11-01 PROCEDURE — 74160 CT ABDOMEN W/CONTRAST: CPT

## 2019-11-01 RX ADMIN — IOHEXOL 100 ML: 350 INJECTION, SOLUTION INTRAVENOUS at 11:33

## 2019-11-01 RX ADMIN — IOHEXOL 50 ML: 240 INJECTION, SOLUTION INTRATHECAL; INTRAVASCULAR; INTRAVENOUS; ORAL at 11:33

## 2019-11-03 LAB
BACTERIA UR CULT: ABNORMAL

## 2019-11-04 ENCOUNTER — TELEPHONE (OUTPATIENT)
Dept: FAMILY MEDICINE CLINIC | Facility: CLINIC | Age: 84
End: 2019-11-04

## 2019-11-04 DIAGNOSIS — K76.9 LIVER LESION: Primary | ICD-10-CM

## 2019-11-05 ENCOUNTER — TELEPHONE (OUTPATIENT)
Dept: UROLOGY | Facility: MEDICAL CENTER | Age: 84
End: 2019-11-05

## 2019-11-05 NOTE — TELEPHONE ENCOUNTER
Hospital follow up please triage  Daughter called for appointment due to mother having alzheimer's  Patient has recurrent uti  Offered appointment for 12/03/2019 please advise if acceptable

## 2019-11-08 DIAGNOSIS — G40.209 PARTIAL SYMPTOMATIC EPILEPSY WITH COMPLEX PARTIAL SEIZURES, NOT INTRACTABLE, WITHOUT STATUS EPILEPTICUS (HCC): ICD-10-CM

## 2019-11-08 RX ORDER — DIVALPROEX SODIUM 250 MG/1
250 TABLET, DELAYED RELEASE ORAL EVERY 12 HOURS SCHEDULED
Qty: 60 TABLET | Refills: 1 | Status: SHIPPED | OUTPATIENT
Start: 2019-11-08 | End: 2019-11-22

## 2019-11-14 DIAGNOSIS — F02.80 ALZHEIMER'S DEMENTIA WITHOUT BEHAVIORAL DISTURBANCE, UNSPECIFIED TIMING OF DEMENTIA ONSET (HCC): ICD-10-CM

## 2019-11-14 DIAGNOSIS — G30.9 ALZHEIMER'S DEMENTIA WITHOUT BEHAVIORAL DISTURBANCE, UNSPECIFIED TIMING OF DEMENTIA ONSET (HCC): ICD-10-CM

## 2019-11-14 RX ORDER — MEMANTINE HYDROCHLORIDE 10 MG/1
10 TABLET ORAL 2 TIMES DAILY
Qty: 180 TABLET | Refills: 3 | Status: SHIPPED | OUTPATIENT
Start: 2019-11-14 | End: 2020-07-10

## 2019-11-14 NOTE — TELEPHONE ENCOUNTER
Patient's daughter requesting the script for her mother's Memantine (NAMENDA) 10 mg tablet be redirected from the [facility] to [facility]       Thank you

## 2019-11-18 ENCOUNTER — PATIENT OUTREACH (OUTPATIENT)
Dept: FAMILY MEDICINE CLINIC | Facility: CLINIC | Age: 84
End: 2019-11-18

## 2019-11-18 DIAGNOSIS — I10 ESSENTIAL HYPERTENSION: ICD-10-CM

## 2019-11-19 ENCOUNTER — PATIENT OUTREACH (OUTPATIENT)
Dept: FAMILY MEDICINE CLINIC | Facility: CLINIC | Age: 84
End: 2019-11-19

## 2019-11-19 ENCOUNTER — APPOINTMENT (EMERGENCY)
Dept: RADIOLOGY | Facility: HOSPITAL | Age: 84
End: 2019-11-19
Payer: MEDICARE

## 2019-11-19 ENCOUNTER — APPOINTMENT (EMERGENCY)
Dept: CT IMAGING | Facility: HOSPITAL | Age: 84
End: 2019-11-19
Payer: MEDICARE

## 2019-11-19 ENCOUNTER — HOSPITAL ENCOUNTER (EMERGENCY)
Facility: HOSPITAL | Age: 84
Discharge: HOME | End: 2019-11-19
Attending: EMERGENCY MEDICINE
Payer: MEDICARE

## 2019-11-19 VITALS
TEMPERATURE: 98.1 F | HEART RATE: 52 BPM | DIASTOLIC BLOOD PRESSURE: 63 MMHG | SYSTOLIC BLOOD PRESSURE: 135 MMHG | OXYGEN SATURATION: 90 % | RESPIRATION RATE: 16 BRPM

## 2019-11-19 DIAGNOSIS — R93.5 ABNORMAL CT OF THE ABDOMEN: ICD-10-CM

## 2019-11-19 DIAGNOSIS — N28.1 RENAL CYST: ICD-10-CM

## 2019-11-19 DIAGNOSIS — R53.83 FATIGUE: ICD-10-CM

## 2019-11-19 DIAGNOSIS — N94.9 ADNEXAL CYST: ICD-10-CM

## 2019-11-19 DIAGNOSIS — K59.00 CONSTIPATION: Primary | ICD-10-CM

## 2019-11-19 LAB
ALBUMIN SERPL BCP-MCNC: 3.5 G/DL (ref 3.5–5)
ALP SERPL-CCNC: 135 U/L (ref 46–116)
ALT SERPL W P-5'-P-CCNC: 24 U/L (ref 12–78)
ANION GAP SERPL CALCULATED.3IONS-SCNC: 7 MMOL/L (ref 4–13)
APTT PPP: 34 SECONDS (ref 23–37)
AST SERPL W P-5'-P-CCNC: 15 U/L (ref 5–45)
BACTERIA UR QL AUTO: ABNORMAL /HPF
BASOPHILS # BLD AUTO: 0.07 THOUSANDS/ΜL (ref 0–0.1)
BASOPHILS NFR BLD AUTO: 1 % (ref 0–1)
BILIRUB SERPL-MCNC: 0.5 MG/DL (ref 0.2–1)
BILIRUB UR QL STRIP: NEGATIVE
BUN SERPL-MCNC: 25 MG/DL (ref 5–25)
CALCIUM SERPL-MCNC: 9.5 MG/DL (ref 8.3–10.1)
CHLORIDE SERPL-SCNC: 108 MMOL/L (ref 100–108)
CLARITY UR: CLEAR
CO2 SERPL-SCNC: 32 MMOL/L (ref 21–32)
COLOR UR: YELLOW
CREAT SERPL-MCNC: 1.06 MG/DL (ref 0.6–1.3)
EOSINOPHIL # BLD AUTO: 0 THOUSAND/ΜL (ref 0–0.61)
EOSINOPHIL NFR BLD AUTO: 0 % (ref 0–6)
ERYTHROCYTE [DISTWIDTH] IN BLOOD BY AUTOMATED COUNT: 15.9 % (ref 11.6–15.1)
GFR SERPL CREATININE-BSD FRML MDRD: 49 ML/MIN/1.73SQ M
GLUCOSE SERPL-MCNC: 99 MG/DL (ref 65–140)
GLUCOSE UR STRIP-MCNC: NEGATIVE MG/DL
HCT VFR BLD AUTO: 39.2 % (ref 34.8–46.1)
HGB BLD-MCNC: 11.6 G/DL (ref 11.5–15.4)
HGB UR QL STRIP.AUTO: NEGATIVE
IMM GRANULOCYTES # BLD AUTO: 0.05 THOUSAND/UL (ref 0–0.2)
IMM GRANULOCYTES NFR BLD AUTO: 0 % (ref 0–2)
INR PPP: 1.13 (ref 0.84–1.19)
KETONES UR STRIP-MCNC: ABNORMAL MG/DL
LACTATE SERPL-SCNC: 1.7 MMOL/L (ref 0.5–2)
LEUKOCYTE ESTERASE UR QL STRIP: NEGATIVE
LYMPHOCYTES # BLD AUTO: 2.14 THOUSANDS/ΜL (ref 0.6–4.47)
LYMPHOCYTES NFR BLD AUTO: 19 % (ref 14–44)
MCH RBC QN AUTO: 21.4 PG (ref 26.8–34.3)
MCHC RBC AUTO-ENTMCNC: 29.6 G/DL (ref 31.4–37.4)
MCV RBC AUTO: 73 FL (ref 82–98)
MONOCYTES # BLD AUTO: 1.17 THOUSAND/ΜL (ref 0.17–1.22)
MONOCYTES NFR BLD AUTO: 10 % (ref 4–12)
NEUTROPHILS # BLD AUTO: 7.89 THOUSANDS/ΜL (ref 1.85–7.62)
NEUTS SEG NFR BLD AUTO: 70 % (ref 43–75)
NITRITE UR QL STRIP: NEGATIVE
NON-SQ EPI CELLS URNS QL MICRO: ABNORMAL /HPF
NRBC BLD AUTO-RTO: 0 /100 WBCS
PH UR STRIP.AUTO: 5 [PH]
PLATELET # BLD AUTO: 319 THOUSANDS/UL (ref 149–390)
PMV BLD AUTO: 10.8 FL (ref 8.9–12.7)
POTASSIUM SERPL-SCNC: 3.9 MMOL/L (ref 3.5–5.3)
PROT SERPL-MCNC: 7.3 G/DL (ref 6.4–8.2)
PROT UR STRIP-MCNC: ABNORMAL MG/DL
PROTHROMBIN TIME: 14.5 SECONDS (ref 11.6–14.5)
RBC # BLD AUTO: 5.41 MILLION/UL (ref 3.81–5.12)
RBC #/AREA URNS AUTO: ABNORMAL /HPF
SODIUM SERPL-SCNC: 147 MMOL/L (ref 136–145)
SP GR UR STRIP.AUTO: 1.02 (ref 1–1.03)
TROPONIN I SERPL-MCNC: <0.02 NG/ML
TSH SERPL DL<=0.05 MIU/L-ACNC: 2.1 UIU/ML (ref 0.36–3.74)
UROBILINOGEN UR QL STRIP.AUTO: 0.2 E.U./DL
WBC # BLD AUTO: 11.32 THOUSAND/UL (ref 4.31–10.16)
WBC #/AREA URNS AUTO: ABNORMAL /HPF

## 2019-11-19 PROCEDURE — 83605 ASSAY OF LACTIC ACID: CPT | Performed by: PHYSICIAN ASSISTANT

## 2019-11-19 PROCEDURE — 84443 ASSAY THYROID STIM HORMONE: CPT | Performed by: PHYSICIAN ASSISTANT

## 2019-11-19 PROCEDURE — 81001 URINALYSIS AUTO W/SCOPE: CPT | Performed by: PHYSICIAN ASSISTANT

## 2019-11-19 PROCEDURE — 36415 COLL VENOUS BLD VENIPUNCTURE: CPT | Performed by: PHYSICIAN ASSISTANT

## 2019-11-19 PROCEDURE — 70450 CT HEAD/BRAIN W/O DYE: CPT

## 2019-11-19 PROCEDURE — 85730 THROMBOPLASTIN TIME PARTIAL: CPT | Performed by: PHYSICIAN ASSISTANT

## 2019-11-19 PROCEDURE — 85025 COMPLETE CBC W/AUTO DIFF WBC: CPT | Performed by: PHYSICIAN ASSISTANT

## 2019-11-19 PROCEDURE — 80053 COMPREHEN METABOLIC PANEL: CPT | Performed by: PHYSICIAN ASSISTANT

## 2019-11-19 PROCEDURE — 93005 ELECTROCARDIOGRAM TRACING: CPT

## 2019-11-19 PROCEDURE — 96360 HYDRATION IV INFUSION INIT: CPT

## 2019-11-19 PROCEDURE — 74177 CT ABD & PELVIS W/CONTRAST: CPT

## 2019-11-19 PROCEDURE — 85610 PROTHROMBIN TIME: CPT | Performed by: PHYSICIAN ASSISTANT

## 2019-11-19 PROCEDURE — 99285 EMERGENCY DEPT VISIT HI MDM: CPT

## 2019-11-19 PROCEDURE — 99284 EMERGENCY DEPT VISIT MOD MDM: CPT | Performed by: PHYSICIAN ASSISTANT

## 2019-11-19 PROCEDURE — 71046 X-RAY EXAM CHEST 2 VIEWS: CPT

## 2019-11-19 PROCEDURE — 84484 ASSAY OF TROPONIN QUANT: CPT | Performed by: PHYSICIAN ASSISTANT

## 2019-11-19 RX ORDER — POLYETHYLENE GLYCOL 3350 17 G/17G
17 POWDER, FOR SOLUTION ORAL DAILY
Qty: 14 EACH | Refills: 0 | Status: SHIPPED | OUTPATIENT
Start: 2019-11-19 | End: 2020-06-03 | Stop reason: ALTCHOICE

## 2019-11-19 RX ORDER — DOCUSATE SODIUM 100 MG/1
100 CAPSULE, LIQUID FILLED ORAL EVERY 12 HOURS
Qty: 28 CAPSULE | Refills: 0 | Status: SHIPPED | OUTPATIENT
Start: 2019-11-19 | End: 2020-02-21

## 2019-11-19 RX ORDER — LACTULOSE 20 G/30ML
20 SOLUTION ORAL ONCE
Status: COMPLETED | OUTPATIENT
Start: 2019-11-19 | End: 2019-11-19

## 2019-11-19 RX ADMIN — LACTULOSE 20 G: 10 SOLUTION ORAL at 15:13

## 2019-11-19 RX ADMIN — SODIUM CHLORIDE 500 ML: 0.9 INJECTION, SOLUTION INTRAVENOUS at 12:38

## 2019-11-19 RX ADMIN — IOHEXOL 100 ML: 350 INJECTION, SOLUTION INTRAVENOUS at 13:30

## 2019-11-19 NOTE — ED NOTES
Patient's family reports increased aggression, decreased urine production, and poor oral intake  Family reports that patient is quieter/less interactive than usual  Patient complains of being tired       Berny Pérez RN  11/19/19 6950

## 2019-11-19 NOTE — DISCHARGE INSTRUCTIONS
Take stool softeners as prescribed  Stay hydrated and drink plenty of fluids      Follow-up with your family doctor in 2-3 days    Return to ER with abdominal pain and fevers

## 2019-11-19 NOTE — ED NOTES
500 ml out from straight cath due to void [address] [facility], [address] [facility]  11/19/19 1787

## 2019-11-19 NOTE — ED PROVIDER NOTES
History  Chief Complaint   Patient presents with    Altered Mental Status     c/o being "extremely tired all the time, weak, personality changes x1 week" pts caregiver reports pt became aggeressive starting the past two days with little to no urine output x2 days  80yo female with a PMH of dementia, epilepsy, and previous CVA presenting for evaluation of altered mental status  History is provided mainly by patient's daughter and caregiver at bedside due to patient's dementia  Patient has had a steady decline in her mental status over the past few months  Her Keppra was decreased due to these symptoms without improvement  Patient has been less interactive and more lethargic the past few days  Patient also had an episode of agitation and attempted to hit her caregiver which is unusual for her  Her daughter states that she has required more assistance with her activities of daily living throughout this time  Patient also has had decreased urine output since yesterday and has only urinated a small amount this morning  Daughter states these at the same symptoms she had when she had her UTI last month  Patient required admission for severe sepsis secondary to UTI last month at [facility]  Patient's only current complaint is feeling tired  Her caregiver thinks her abdomen is more distended than usual        History provided by:  Patient, relative and caregiver  Altered Mental Status   Presenting symptoms: behavior changes, lethargy and memory loss (Chronic)    Presenting symptoms: no unresponsiveness    Severity:  Moderate  Most recent episode:   Today  Episode history:  Continuous  Timing:  Constant  Progression:  Worsening  Chronicity:  Recurrent  Context: dementia and recent infection    Context: not head injury, taking medications as prescribed and not nursing home resident    Associated symptoms: agitation and weakness    Associated symptoms: no abdominal pain, no difficulty breathing, no fever, no headaches, no seizures, no slurred speech and no vomiting        Prior to Admission Medications   Prescriptions Last Dose Informant Patient Reported? Taking? B Complex-C (B-COMPLEX WITH VITAMIN C) tablet  Care Giver Yes Yes   Sig: Take 1 tablet by mouth daily   Calcium-Magnesium-Zinc 333-133-5 MG TABS  Care Giver Yes Yes   Sig: Take 1 tablet by mouth daily   Cholecalciferol (VITAMIN D3) 5000 units CAPS  Self Yes Yes   Sig: Take by mouth daily   aspirin 81 MG tablet 11/19/2019 at Unknown time  No Yes   Sig: Take 1 tablet (81 mg total) by mouth daily   divalproex sodium (DEPAKOTE) 250 mg EC tablet Unknown at Unknown time  No No   Sig: Take 1 tablet (250 mg total) by mouth every 12 (twelve) hours   donepezil (ARICEPT) 10 mg tablet   No Yes   Sig: Take 1 tablet (10 mg total) by mouth daily at bedtime   gabapentin (NEURONTIN) 400 mg capsule   No Yes   Sig: Take 1 capsule (400 mg total) by mouth 2 (two) times a day   levETIRAcetam (KEPPRA) 750 mg tablet   No Yes   Sig: Two PO b i d  Patient taking differently:  Total of 2500 mg daily   lovastatin (MEVACOR) 20 mg tablet   No Yes   Sig: Take 1 tablet (20 mg total) by mouth daily at bedtime   memantine (NAMENDA) 10 mg tablet   No Yes   Sig: Take 1 tablet (10 mg total) by mouth 2 (two) times a day   metoprolol tartrate (LOPRESSOR) 25 mg tablet   No Yes   Sig: TAKE 1 TABLET BY MOUTH  EVERY 12 HOURS   multivitamin (THERAGRAN) TABS   Yes Yes   Sig: Take 1 tablet by mouth daily      Facility-Administered Medications: None       Past Medical History:   Diagnosis Date    Acute renal failure (ARF) ([facility] [address] ) 10/3/2019    Dementia ([facility] [address] )     17 years ago     Epilepsy ([facility] [address] )     Essential hypertension 5/17/2019    Hypercholesterolemia     [facility]roke [facility])     UTI (urinary tract infection) 10/29/2019       Past Surgical History:   Procedure Laterality Date    CATARACT EXTRACTION, BILATERAL      3-4 years ago 8/2/2019    HYSTERECTOMY      KNEE SURGERY         Family History Problem Relation Age of Onset    Cirrhosis Mother     Alcohol abuse Mother     Cancer Father     No Known Problems Daughter     No Known Problems Brother      I have reviewed and agree with the history as documented  Social History     Tobacco Use    Smoking status: Never Smoker    Smokeless tobacco: Never Used   Substance Use Topics    Alcohol use: Not Currently     Frequency: Never    Drug use: Never        Review of Systems   Unable to perform ROS: Dementia   Constitutional: Negative for fever  Cardiovascular: Negative for chest pain  Gastrointestinal: Negative for abdominal pain and vomiting  Genitourinary: Positive for decreased urine volume  Neurological: Positive for weakness  Negative for seizures and headaches  Psychiatric/Behavioral: Positive for agitation and memory loss (Chronic)  Physical Exam  Physical Exam   Constitutional: She appears well-developed and well-nourished  No distress  Elderly female in no acute distress  Appears fatigued   HENT:   Head: Normocephalic and atraumatic  Right Ear: External ear normal    Left Ear: External ear normal    Nose: Nose normal    Mouth/Throat: Oropharynx is clear and moist    Eyes: Pupils are equal, round, and reactive to light  Conjunctivae and EOM are normal  Right eye exhibits no discharge  Left eye exhibits no discharge  No scleral icterus  Neck: Normal range of motion  Neck supple  Cardiovascular: Normal rate, regular rhythm and normal heart sounds  No murmur heard  Pulmonary/Chest: Effort normal and breath sounds normal  No stridor  No respiratory distress  She has no wheezes  She has no rales  Abdominal: Soft  Bowel sounds are normal  She exhibits distension  There is no tenderness  There is no guarding  Abdomen mildly distended without tenderness   Musculoskeletal: Normal range of motion  She exhibits no edema or deformity  Lymphadenopathy:     She has no cervical adenopathy  Neurological: She is alert   She has normal strength  She is not disoriented  No sensory deficit  GCS eye subscore is 4  GCS verbal subscore is 4  GCS motor subscore is 6  GCS 14 for confusion which is patient's baseline  Equal strength and gross sensation intact throughout  Skin: Skin is warm and dry  She is not diaphoretic  Psychiatric: She has a normal mood and affect  Her behavior is normal    Nursing note and vitals reviewed  Vital Signs  ED Triage Vitals [11/19/19 1118]   Temperature Pulse Respirations Blood Pressure SpO2   98 1 °F (36 7 °C) 55 16 135/63 99 %      Temp Source Heart Rate Source Patient Position - Orthostatic VS BP Location FiO2 (%)   Oral Monitor Lying Right arm --      Pain Score       --           Vitals:    11/19/19 1118 11/19/19 1345   BP: 135/63    Pulse: 55 (!) 52   Patient Position - Orthostatic VS: Lying          Visual Acuity      ED Medications  Medications   sodium chloride 0 9 % bolus 500 mL (0 mL Intravenous Stopped 11/19/19 1348)   iohexol (OMNIPAQUE) 350 MG/ML injection (MULTI-DOSE) 100 mL (100 mL Intravenous Given 11/19/19 1330)   lactulose 20 g/30 mL oral solution 20 g (20 g Oral Given 11/19/19 1513)       Diagnostic Studies  Results Reviewed     Procedure Component Value Units Date/Time    TSH [[id number]]  (Normal) Collected:  11/19/19 1232    Lab Status:  Final result Specimen:  Blood from Arm, Left Updated:  11/19/19 1312     TSH 3RD GENERATON 2 105 uIU/mL     Narrative:       Patients undergoing fluorescein dye angiography may retain small amounts of fluorescein in the body for 48-72 hours post procedure  Samples containing fluorescein can produce falsely depressed TSH values  If the patient had this procedure,a specimen should be resubmitted post fluorescein clearance        Lactic acid, plasma [[id number]]  (Normal) Collected:  11/19/19 1232    Lab Status:  Final result Specimen:  Blood from Arm, Left Updated:  11/19/19 1309     LACTIC ACID 1 7 mmol/L     Narrative:       Result may be elevated if tourniquet was used during collection      Urine Microscopic [[id number]]  (Abnormal) Collected:  11/19/19 1247    Lab Status:  Final result Specimen:  Urine, Straight Cath Updated:  11/19/19 1304     RBC, UA 0-1 /hpf      WBC, UA None Seen /hpf      Epithelial Cells Occasional /hpf      Bacteria, UA Occasional /hpf     Troponin I [[id number]]  (Normal) Collected:  11/19/19 1232    Lab Status:  Final result Specimen:  Blood from Arm, Left Updated:  11/19/19 1300     Troponin I <0 02 ng/mL     Comprehensive metabolic panel [[id number]]  (Abnormal) Collected:  11/19/19 1232    Lab Status:  Final result Specimen:  Blood from Arm, Left Updated:  11/19/19 1258     Sodium 147 mmol/L      Potassium 3 9 mmol/L      Chloride 108 mmol/L      CO2 32 mmol/L      ANION GAP 7 mmol/L      BUN 25 mg/dL      Creatinine 1 06 mg/dL      Glucose 99 mg/dL      Calcium 9 5 mg/dL      AST 15 U/L      ALT 24 U/L      Alkaline Phosphatase 135 U/L      Total Protein 7 3 g/dL      Albumin 3 5 g/dL      Total Bilirubin 0 50 mg/dL      eGFR 49 ml/min/1 73sq m     Narrative:       [place] guidelines for Chronic Kidney Disease (CKD):     Stage 1 with normal or high GFR (GFR > 90 mL/min/1 73 square meters)    Stage 2 Mild CKD (GFR = 60-89 mL/min/1 73 square meters)    Stage 3A Moderate CKD (GFR = 45-59 mL/min/1 73 square meters)    Stage 3B Moderate CKD (GFR = 30-44 mL/min/1 73 square meters)    Stage 4 Severe CKD (GFR = 15-29 mL/min/1 73 square meters)    Stage 5 End Stage CKD (GFR <15 mL/min/1 73 square meters)  Note: GFR calculation is accurate only with a steady state creatinine    UA w Reflex to Microscopic w Reflex to Culture [[id number]]  (Abnormal) Collected:  11/19/19 1247    Lab Status:  Final result Specimen:  Urine, Straight Cath Updated:  11/19/19 1255     Color, UA Yellow     Clarity, UA Clear     Specific Gravity, UA 1 025     pH, UA 5 0     Leukocytes, UA Negative     Nitrite, UA Negative     Protein, UA Trace mg/dl      Glucose, UA Negative mg/dl      Ketones, UA Trace mg/dl      Urobilinogen, UA 0 2 E U /dl      Bilirubin, UA Negative     Blood, UA Negative    Protime-INR [[id number]]  (Normal) Collected:  11/19/19 1232    Lab Status:  Final result Specimen:  Blood from Arm, Left Updated:  11/19/19 1251     Protime 14 5 seconds      INR 1 13    APTT [[id number]]  (Normal) Collected:  11/19/19 1232    Lab Status:  Final result Specimen:  Blood from Arm, Left Updated:  11/19/19 1251     PTT 34 seconds     CBC and differential [[id number]]  (Abnormal) Collected:  11/19/19 1232    Lab Status:  Final result Specimen:  Blood from Arm, Left Updated:  11/19/19 1244     WBC 11 32 Thousand/uL      RBC 5 41 Million/uL      Hemoglobin 11 6 g/dL      Hematocrit 39 2 %      MCV 73 fL      MCH 21 4 pg      MCHC 29 6 g/dL      RDW 15 9 %      MPV 10 8 fL      Platelets 257 Thousands/uL      nRBC 0 /100 WBCs      Neutrophils Relative 70 %      Immat GRANS % 0 %      Lymphocytes Relative 19 %      Monocytes Relative 10 %      Eosinophils Relative 0 %      Basophils Relative 1 %      Neutrophils Absolute 7 89 Thousands/µL      Immature Grans Absolute 0 05 Thousand/uL      Lymphocytes Absolute 2 14 Thousands/µL      Monocytes Absolute 1 17 Thousand/µL      Eosinophils Absolute 0 00 Thousand/µL      Basophils Absolute 0 07 Thousands/µL                  XR chest 2 views   Final Result by Barry Canales MD (11/19 4338)   Stable exam as above  No acute cardiopulmonary disease  Workstation performed: [id number]         CT head without contrast   Final Result by Kristy Gregorio MD (11/19 4517)      Evidence of chronic infarct of the left hemisphere  No evidence of acute infarct or intracranial hemorrhage  Workstation performed: [id number]         CT abdomen pelvis with contrast   Final Result by Kristy Gregorio MD (11/19 4751)      Marked fecal stasis        Diffuse colon diverticulosis with probable subtle diverticulitis involving the descending colon  There is no evidence of collection or free air  Large cyst in the right hemipelvis most likely is ovarian in nature  Elective outpatient ultrasound is suggested in this postmenopausal female  Renal cyst with one being indeterminate in density measuring up to 9 mm in size  Interval follow-up with a pre and postcontrast study is suggested to assess for possibility of hemorrhagic cyst in 3 to 6 months time  Complex liver lesion is not significantly changed since the study of November 1  MRI was advised on the prior exam and could also further assess renal lesions  Immediate notification was marked on the Epic system            Workstation performed: [id number]                    Procedures  ECG 12 Lead Documentation Only  Date/Time: 11/19/2019 1:55 PM  Performed by: Angela Thomas PA-C  Authorized by: Angela Thomas PA-C     Indications / Diagnosis:  AMS  ECG reviewed by me, the ED Provider: yes    Patient location:  ED  Previous ECG:     Previous ECG:  Compared to current    Comparison ECG info:  10/3/19    Similarity:  No change    Comparison to cardiac monitor: Yes    Interpretation:     Interpretation: abnormal    Rate:     ECG rate:  51    ECG rate assessment: bradycardic    Rhythm:     Rhythm: sinus rhythm    Ectopy:     Ectopy: none    QRS:     QRS axis:  Normal    QRS intervals:  Normal  Conduction:     Conduction: normal    ST segments:     ST segments:  Normal  T waves:     T waves: flattening      Flattening:  III and aVF           ED Course  ED Course as of Nov 19 1834   Tue Nov 19, 2019   1449 Had a long discussion with patient's daughter about CT scan results  Possible subtle diverticulitis seen on CT but patient has no symptoms to support this  WBC count is at her baseline and patient is afebrile  I suspect her abdominal distention is related to the constipation   Patient's daughter elects to hold on antibiotics at this time and treat her for constipation to monitor for improvement  Good ED return precautions discussed including fevers and abdominal pain  Patient has a scheduled follow-up with her PCP on Friday  Identification of Seniors at Risk      Most Recent Value   (ISAR) Identification of Seniors at Risk   Before the illness or injury that brought you to the Emergency, did you need someone to help you on a regular basis? 1 Filed at: 11/19/2019 1120   In the last 24 hours, have you needed more help than usual?  1 Filed at: 11/19/2019 1120   Have you been hospitalized for one or more nights during the past 6 months? 1 Filed at: 11/19/2019 1120   In general, do you see well?  0 Filed at: 11/19/2019 1120   In general, do you have serious problems with your memory? 1 Filed at: 11/19/2019 1120   Do you take more than three different medications every day? 1 Filed at: 11/19/2019 1120   ISAR Score  5 Filed at: 11/19/2019 1120                          MDM  Number of Diagnoses or Management Options  Abnormal CT of the abdomen: new and requires workup  Adnexal cyst: new and requires workup  Constipation: new and requires workup  Fatigue: new and requires workup  Renal cyst: new and requires workup  Diagnosis management comments: 82yo female with a history of dementia presenting for evaluation of altered mental status  No focal neuro deficits on exam  Her only complaint is feeling tired  Differential diagnosis includes but is not limited to: UTI, dehydration, REYNALDO, electrolyte abnormality, intracranial bleed, CVA, intra-abdominal infection, constipation, obstruction, worsening dementia    Initial ED plan: Labs including CBC, CMP, lactate, TSH, UA  Will check CT head and CT abdomen given abdominal distention  IV fluid bolus    Final assessments: Labs reveal mild leukocytosis which has been stable from her previous lab draws  Creatinine WNL  Mild elevation in sodium consistent with dehydration   Lactate normal  UA not suggestive of infection  CT reveals evidence of extensive constipation  There is also subtle evidence of mild diverticulitis  Several incidental findings including renal cyst, liver lesion, and adnexal cyst  None of the incidental findings require emergent workup  Daughter informed of incidental findings and copy of CT scan result given  Had a long discussion with daughter regarding questionable diverticulitis  Patient does not complain of abdominal pain and she is afebrile  Clinically she does not appear to have diverticulitis  Discussed with daughter that the risks vs benefits of antibiotic therapy  Offered antibiotic therapy vs holding on treatment and monitoring patient's symptoms  Daughter elected to hold on antibiotics  Will treat for constipation as I suspect this is the main cause of her abdominal distention  Will avoid enemas given questionable diverticulitis  Patient given dose of lactulose in ED and scripts for Colace and Miralax given  Advised close PCP follow-up for repeat abdominal exam  ED return precautions discussed at length including fevers  Daughter expressed understanding and is agreeable to plan  Patient discharged in stable condition          Amount and/or Complexity of Data Reviewed  Clinical lab tests: ordered and reviewed  Tests in the radiology section of CPT®: ordered and reviewed  Obtain history from someone other than the patient: yes (Daughter, caregiver)  Review and summarize past medical records: yes  Independent visualization of images, tracings, or specimens: yes    Risk of Complications, Morbidity, and/or Mortality  Presenting problems: high  Diagnostic procedures: high  Management options: high    Patient Progress  Patient progress: stable      Disposition  Final diagnoses:   Constipation   Fatigue   Renal cyst   Adnexal cyst   Abnormal CT of the abdomen     Time reflects when diagnosis was documented in both MDM as applicable and the Disposition within this note     Time User Action Codes Description Comment    11/19/2019  3:01 PM Katelin Ortiz Add [K59 00] Constipation     11/19/2019  3:01 PM Katelin Gross Add [R53 83] Fatigue     11/19/2019  3:01 PM Anshu Friend [N28 1] Renal cyst     11/19/2019  3:02 PM Anshu Friend [N94 9] Adnexal cyst     11/19/2019  3:03 PM Eileen Friend Add [R93 5] Abnormal CT of the abdomen       ED Disposition     ED Disposition Condition Date/Time Comment    Discharge Stable Tue Nov 19, 2019  3:01 PM Tiffany Hawkins discharge to home/self care              Follow-up Information     Follow up With Specialties Details Why Contact Info Additional Information    Monet Shea MD Family Medicine Schedule an appointment as soon as possible for a visit   [address] Emergency Department Emergency Medicine  If symptoms worsen [address] [facility] [address]  [phone] [place] ED, [address] [facility], [address] [place] [address], [id number]          Discharge Medication List as of 11/19/2019  3:04 PM      START taking these medications    Details   docusate sodium (COLACE) 100 mg capsule Take 1 capsule (100 mg total) by mouth every 12 (twelve) hours for 14 days, Starting Tue 11/19/2019, Until Tue 12/3/2019, Print      polyethylene glycol (MIRALAX) 17 g packet Take 17 g by mouth daily, Starting Tue 11/19/2019, Print         CONTINUE these medications which have NOT CHANGED    Details   aspirin 81 MG tablet Take 1 tablet (81 mg total) by mouth daily, Starting Fri 8/2/2019, No Print      B Complex-C (B-COMPLEX WITH VITAMIN C) tablet Take 1 tablet by mouth daily, Historical Med      Calcium-Magnesium-Zinc 333-133-5 MG TABS Take 1 tablet by mouth daily, Historical Med      Cholecalciferol (VITAMIN D3) 5000 units CAPS Take by mouth daily, Historical Med      divalproex sodium (DEPAKOTE) 250 mg EC tablet Take 1 tablet (250 mg total) by mouth every 12 (twelve) hours, Starting Fri 11/8/2019, Normal      donepezil (ARICEPT) 10 mg tablet Take 1 tablet (10 mg total) by mouth daily at bedtime, Starting Mon 10/14/2019, Normal      gabapentin (NEURONTIN) 400 mg capsule Take 1 capsule (400 mg total) by mouth 2 (two) times a day, Starting Mon 7/22/2019, Normal      levETIRAcetam (KEPPRA) 750 mg tablet Two PO b i d , Normal      lovastatin (MEVACOR) 20 mg tablet Take 1 tablet (20 mg total) by mouth daily at bedtime, Starting Mon 7/29/2019, Normal      memantine (NAMENDA) 10 mg tablet Take 1 tablet (10 mg total) by mouth 2 (two) times a day, Starting Thu 11/14/2019, Normal      metoprolol tartrate (LOPRESSOR) 25 mg tablet TAKE 1 TABLET BY MOUTH  EVERY 12 HOURS, Normal      multivitamin (THERAGRAN) TABS Take 1 tablet by mouth daily, Historical Med           No discharge procedures on file      ED Provider  Electronically Signed by           Mayra Ramirez PA-C  11/19/19 9368

## 2019-11-21 LAB
ATRIAL RATE: 51 BPM
P AXIS: 27 DEGREES
PR INTERVAL: 178 MS
QRS AXIS: 28 DEGREES
QRSD INTERVAL: 86 MS
QT INTERVAL: 422 MS
QTC INTERVAL: 388 MS
T WAVE AXIS: 27 DEGREES
VENTRICULAR RATE: 51 BPM

## 2019-11-21 PROCEDURE — 93010 ELECTROCARDIOGRAM REPORT: CPT | Performed by: INTERNAL MEDICINE

## 2019-11-22 ENCOUNTER — OFFICE VISIT (OUTPATIENT)
Dept: FAMILY MEDICINE CLINIC | Facility: CLINIC | Age: 84
End: 2019-11-22
Payer: MEDICARE

## 2019-11-22 ENCOUNTER — APPOINTMENT (OUTPATIENT)
Dept: LAB | Facility: HOSPITAL | Age: 84
End: 2019-11-22
Attending: FAMILY MEDICINE
Payer: MEDICARE

## 2019-11-22 ENCOUNTER — DOCUMENTATION (OUTPATIENT)
Dept: NEUROLOGY | Facility: CLINIC | Age: 84
End: 2019-11-22

## 2019-11-22 VITALS
TEMPERATURE: 98.5 F | HEART RATE: 52 BPM | HEIGHT: 65 IN | WEIGHT: 163.2 LBS | OXYGEN SATURATION: 98 % | SYSTOLIC BLOOD PRESSURE: 128 MMHG | DIASTOLIC BLOOD PRESSURE: 70 MMHG | BODY MASS INDEX: 27.19 KG/M2

## 2019-11-22 DIAGNOSIS — E78.5 HYPERLIPIDEMIA, UNSPECIFIED HYPERLIPIDEMIA TYPE: ICD-10-CM

## 2019-11-22 DIAGNOSIS — K76.9 HEPATIC LESION: ICD-10-CM

## 2019-11-22 DIAGNOSIS — N28.1 RENAL CYST: ICD-10-CM

## 2019-11-22 DIAGNOSIS — G40.209 PARTIAL SYMPTOMATIC EPILEPSY WITH COMPLEX PARTIAL SEIZURES, NOT INTRACTABLE, WITHOUT STATUS EPILEPTICUS (HCC): ICD-10-CM

## 2019-11-22 DIAGNOSIS — R53.83 FATIGUE, UNSPECIFIED TYPE: ICD-10-CM

## 2019-11-22 DIAGNOSIS — N83.209 CYST OF OVARY, UNSPECIFIED LATERALITY: Primary | ICD-10-CM

## 2019-11-22 DIAGNOSIS — F03.90 DEMENTIA WITHOUT BEHAVIORAL DISTURBANCE, UNSPECIFIED DEMENTIA TYPE (HCC): ICD-10-CM

## 2019-11-22 DIAGNOSIS — I10 ESSENTIAL HYPERTENSION: ICD-10-CM

## 2019-11-22 DIAGNOSIS — Z00.00 MEDICARE ANNUAL WELLNESS VISIT, SUBSEQUENT: ICD-10-CM

## 2019-11-22 PROBLEM — A41.9 SEVERE SEPSIS (HCC): Status: RESOLVED | Noted: 2019-10-03 | Resolved: 2019-11-22

## 2019-11-22 PROBLEM — G92.8 TOXIC METABOLIC ENCEPHALOPATHY: Status: RESOLVED | Noted: 2019-10-03 | Resolved: 2019-11-22

## 2019-11-22 PROBLEM — N39.0 UTI (URINARY TRACT INFECTION): Status: RESOLVED | Noted: 2019-10-29 | Resolved: 2019-11-22

## 2019-11-22 PROBLEM — R65.20 SEVERE SEPSIS (HCC): Status: RESOLVED | Noted: 2019-10-03 | Resolved: 2019-11-22

## 2019-11-22 PROBLEM — R77.8 ELEVATED TROPONIN I LEVEL: Status: RESOLVED | Noted: 2019-10-04 | Resolved: 2019-11-22

## 2019-11-22 LAB — VALPROATE SERPL-MCNC: 39 UG/ML (ref 50–100)

## 2019-11-22 PROCEDURE — 80164 ASSAY DIPROPYLACETIC ACD TOT: CPT

## 2019-11-22 PROCEDURE — 99215 OFFICE O/P EST HI 40 MIN: CPT | Performed by: FAMILY MEDICINE

## 2019-11-22 PROCEDURE — 36415 COLL VENOUS BLD VENIPUNCTURE: CPT

## 2019-11-22 PROCEDURE — G0439 PPPS, SUBSEQ VISIT: HCPCS | Performed by: FAMILY MEDICINE

## 2019-11-22 PROCEDURE — 80177 DRUG SCRN QUAN LEVETIRACETAM: CPT

## 2019-11-22 RX ORDER — DIVALPROEX SODIUM 250 MG/1
250 TABLET, DELAYED RELEASE ORAL EVERY 12 HOURS SCHEDULED
Qty: 60 TABLET | Refills: 1
Start: 2019-11-22 | End: 2019-11-25

## 2019-11-22 NOTE — PROGRESS NOTES
Assessment/Plan:  Return visit in 3 months  40 minutes was spent evaluating this patient's problems in formulating a treatment plan     Diagnoses and all orders for this visit:    Cyst of ovary, unspecified laterality  -     US pelvis complete w transvaginal; Future    Medicare annual wellness visit, subsequent    Fatigue, unspecified type    Partial symptomatic epilepsy with complex partial seizures, not intractable, without status epilepticus (HCC)  -     Levetiracetam level; Future  -     divalproex sodium (DEPAKOTE) 250 mg EC tablet; Take 1 tablet (250 mg total) by mouth every 12 (twelve) hours  -     Valproic acid level, total; Future    Hepatic lesion    Dementia without behavioral disturbance, unspecified dementia type ([facility] )    Renal cyst    Essential hypertension    Hyperlipidemia, unspecified hyperlipidemia type        Essential hypertension  Continue Lopressor 25 mg b i d  Dementia without behavioral disturbance (HCC)  Continue Aricept and Namenda    Hyperlipidemia  Continue lovastatin 20 mg daily    Hepatic lesion  We will re-evaluate this in 3 months  Renal cyst  We will re-evaluate this in 3 months  Subjective:      Patient ID: Lincoln Frost is a 81 y o  female  Patient is brought by her daughter with her caregiver  Since she was here last she was hospitalized with severe sepsis  She had 2 other ER visits for UTIs  She has become more fatigued, less ambulatory and less communicative  She was having seizures and her Keppra was increased  While in the hospital she was noted to have a liver lesion, renal cyst and ovarian cyst on scanning  All these were recommended to have follow-up        The following portions of the patient's history were reviewed and updated as appropriate:   She has a past medical history of Acute renal failure (ARF) ([facility] ) (10/3/2019), Dementia ([facility] ), Epilepsy ([facility] ), Essential hypertension (5/17/2019), Hypercholesterolemia, Stroke [facility]), and UTI (urinary tract infection) (10/29/2019)  ,  does not have any pertinent problems on file  ,   has a past surgical history that includes Hysterectomy; Knee surgery; and Cataract extraction, bilateral ,  family history includes Alcohol abuse in her mother; Cancer in her father; Cirrhosis in her mother; No Known Problems in her brother and daughter  ,   reports that she has never smoked  She has never used smokeless tobacco  She reports that she drank alcohol  She reports that she does not use drugs  ,  has No Known Allergies     Current Outpatient Medications   Medication Sig Dispense Refill    aspirin 81 MG tablet Take 1 tablet (81 mg total) by mouth daily 30 tablet 5    B Complex-C (B-COMPLEX WITH VITAMIN C) tablet Take 1 tablet by mouth daily      Calcium-Magnesium-Zinc 333-133-5 MG TABS Take 1 tablet by mouth daily      Cholecalciferol (VITAMIN D3) 5000 units CAPS Take by mouth daily      divalproex sodium (DEPAKOTE) 250 mg EC tablet Take 1 tablet (250 mg total) by mouth every 12 (twelve) hours 60 tablet 1    docusate sodium (COLACE) 100 mg capsule Take 1 capsule (100 mg total) by mouth every 12 (twelve) hours for 14 days 28 capsule 0    donepezil (ARICEPT) 10 mg tablet Take 1 tablet (10 mg total) by mouth daily at bedtime 30 tablet 1    gabapentin (NEURONTIN) 400 mg capsule Take 1 capsule (400 mg total) by mouth 2 (two) times a day 180 capsule 2    levETIRAcetam (KEPPRA) 750 mg tablet Two PO b i d  (Patient taking differently:  Total of 2500 mg daily) 120 tablet 5    lovastatin (MEVACOR) 20 mg tablet Take 1 tablet (20 mg total) by mouth daily at bedtime 90 tablet 3    memantine (NAMENDA) 10 mg tablet Take 1 tablet (10 mg total) by mouth 2 (two) times a day 180 tablet 3    metoprolol tartrate (LOPRESSOR) 25 mg tablet TAKE 1 TABLET BY MOUTH  EVERY 12 HOURS 180 tablet 1    multivitamin (THERAGRAN) TABS Take 1 tablet by mouth daily      polyethylene glycol (MIRALAX) 17 g packet Take 17 g by mouth daily 14 each 0     No current facility-administered medications for this visit  Review of Systems   Constitutional: Positive for fatigue  Negative for fever  HENT: Negative  Respiratory: Negative  Cardiovascular: Negative  Gastrointestinal: Negative  Endocrine: Negative  Genitourinary: Positive for decreased urine volume  Musculoskeletal: Positive for gait problem  Allergic/Immunologic: Negative  Neurological: Negative for seizures  Psychiatric/Behavioral: Positive for confusion  Objective:  Vitals:    11/22/19 1040   BP: 128/70   Pulse: (!) 52   Temp: 98 5 °F (36 9 °C)   TempSrc: Tympanic   SpO2: 98%   Weight: 74 kg (163 lb 3 2 oz)   Height: 5' 5" (1 651 m)     Body mass index is 27 16 kg/m²  Physical Exam   Constitutional: She appears well-developed and well-nourished  HENT:   Head: Normocephalic and atraumatic  Right Ear: External ear normal    Left Ear: External ear normal    Eyes: Pupils are equal, round, and reactive to light  EOM are normal    Neck: Neck supple  Cardiovascular: Normal rate, regular rhythm and normal heart sounds  Pulmonary/Chest: Effort normal and breath sounds normal    Abdominal: Soft  Bowel sounds are normal  There is no tenderness  Musculoskeletal: Normal range of motion  Neurological: She is alert  Psychiatric:   Flat affect and drowsy     BMI Counseling: Body mass index is 27 16 kg/m²  The BMI is above normal  Nutrition recommendations include decreasing overall calorie intake

## 2019-11-22 NOTE — PROGRESS NOTES
Assessment and Plan:     Problem List Items Addressed This Visit     None      Visit Diagnoses     Medicare annual wellness visit, subsequent    -  Primary           Preventive health issues were discussed with patient, and age appropriate screening tests were ordered as noted in patient's After Visit Summary  Personalized health advice and appropriate referrals for health education or preventive services given if needed, as noted in patient's After Visit Summary  History of Present Illness:     Patient presents for Medicare Annual Wellness visit    Patient Care Team:  Regino Payne MD as PCP - General (Family Medicine)  Irene Vergara RN as Lead OP Care Mgr (Care Coordination)     Problem List:     Patient Active Problem List   Diagnosis    Seizure disorder [facility])    Essential hypertension    Hyperlipidemia    Dementia without behavioral disturbance ([facility]    Partial symptomatic epilepsy with complex partial seizures, not intractable, without status epilepticus ([facility]    Severe sepsis ([facility]    Toxic metabolic encephalopathy    Elevated troponin I level    Hepatic lesion    UTI (urinary tract infection)      Past Medical and Surgical History:     Past Medical History:   Diagnosis Date    Acute renal failure (ARF) ([facility] ) 10/3/2019    Dementia ([facility] )     17 years ago     Epilepsy ([facility] )     Essential hypertension 5/17/2019    Hypercholesterolemia     Stroke ([facility] )     UTI (urinary tract infection) 10/29/2019     Past Surgical History:   Procedure Laterality Date    CATARACT EXTRACTION, BILATERAL      3-4 years ago 8/2/2019    HYSTERECTOMY      KNEE SURGERY        Family History:     Family History   Problem Relation Age of Onset    Cirrhosis Mother     Alcohol abuse Mother     Cancer Father     No Known Problems Daughter     No Known Problems Brother       Social History:     Social History     Socioeconomic History    Marital status:       Spouse name: None    Number of children: None  Years of education: None    Highest education level: None   Occupational History    None   Social Needs    Financial resource strain: None    Food insecurity:     Worry: None     Inability: None    Transportation needs:     Medical: None     Non-medical: None   Tobacco Use    Smoking status: Never Smoker    Smokeless tobacco: Never Used   Substance and Sexual Activity    Alcohol use: Not Currently     Frequency: Never    Drug use: Never    Sexual activity: Not Currently     Comment: recently    Lifestyle    Physical activity:     Days per week: None     Minutes per session: None    Stress: None   Relationships    Social connections:     Talks on phone: None     Gets together: None     Attends Taoist service: None     Active member of club or organization: None     Attends meetings of clubs or organizations: None     Relationship status: None    Intimate partner violence:     Fear of current or ex partner: None     Emotionally abused: None     Physically abused: None     Forced sexual activity: None   Other Topics Concern    None   Social History Narrative    None       Medications and Allergies:     Current Outpatient Medications   Medication Sig Dispense Refill    aspirin 81 MG tablet Take 1 tablet (81 mg total) by mouth daily 30 tablet 5    B Complex-C (B-COMPLEX WITH VITAMIN C) tablet Take 1 tablet by mouth daily      Calcium-Magnesium-Zinc 333-133-5 MG TABS Take 1 tablet by mouth daily      Cholecalciferol (VITAMIN D3) 5000 units CAPS Take by mouth daily      divalproex sodium (DEPAKOTE) 250 mg EC tablet Take 1 tablet (250 mg total) by mouth every 12 (twelve) hours 60 tablet 1    docusate sodium (COLACE) 100 mg capsule Take 1 capsule (100 mg total) by mouth every 12 (twelve) hours for 14 days 28 capsule 0    donepezil (ARICEPT) 10 mg tablet Take 1 tablet (10 mg total) by mouth daily at bedtime 30 tablet 1    gabapentin (NEURONTIN) 400 mg capsule Take 1 capsule (400 mg total) by mouth 2 (two) times a day 180 capsule 2    levETIRAcetam (KEPPRA) 750 mg tablet Two PO b i d  (Patient taking differently: Total of 2500 mg daily) 120 tablet 5    lovastatin (MEVACOR) 20 mg tablet Take 1 tablet (20 mg total) by mouth daily at bedtime 90 tablet 3    memantine (NAMENDA) 10 mg tablet Take 1 tablet (10 mg total) by mouth 2 (two) times a day 180 tablet 3    metoprolol tartrate (LOPRESSOR) 25 mg tablet TAKE 1 TABLET BY MOUTH  EVERY 12 HOURS 180 tablet 1    multivitamin (THERAGRAN) TABS Take 1 tablet by mouth daily      polyethylene glycol (MIRALAX) 17 g packet Take 17 g by mouth daily 14 each 0     No current facility-administered medications for this visit  No Known Allergies   Immunizations:     Immunization History   Administered Date(s) Administered    Influenza, high dose seasonal 0 5 mL 10/03/2019    Tuberculin Skin Test-PPD Intradermal 08/09/2019      Health Maintenance: There are no preventive care reminders to display for this patient  Topic Date Due    Pneumococcal Vaccine: 65+ Years (1 of 2 - PCV13) 03/22/2001      Medicare Health Risk Assessment:     /70   Pulse (!) 52   Temp 98 5 °F (36 9 °C) (Tympanic)   Ht 5' 5" (1 651 m)   Wt 74 kg (163 lb 3 2 oz)   SpO2 98%   BMI 27 16 kg/m²      Yulissa is here for her Subsequent Wellness visit  Last Medicare Wellness visit information reviewed, patient interviewed and updates made to the record today  Health Risk Assessment:   Patient rates overall health as good  Patient feels that their physical health rating is slightly worse  Eyesight was rated as same  Hearing was rated as same  Patient feels that their emotional and mental health rating is same  Pain experienced in the last 7 days has been none  Patient states that she has experienced no weight loss or gain in last 6 months  Depression Screening:   PHQ-2 Score: 3  PHQ-9 Score: 14      Fall Risk Screening:    In the past year, patient has experienced: no history of falling in past year      Urinary Incontinence Screening:   Patient has leaked urine accidently in the last six months  Home Safety:  Patient has trouble with stairs inside or outside of their home  Patient has working smoke alarms and has working carbon monoxide detector  Home safety hazards include: none  Nutrition:   Current diet is Regular  Medications:   Patient is currently taking over-the-counter supplements  OTC medications include: see medication list  Patient is not able to manage medications  Activities of Daily Living (ADLs)/Instrumental Activities of Daily Living (IADLs):   Walk and transfer into and out of bed and chair?: No  Dress and groom yourself?: No    Bathe or shower yourself?: No    Feed yourself? No  Do your laundry/housekeeping?: No  Manage your money, pay your bills and track your expenses?: No  Make your own meals?: No    Do your own shopping?: No    Previous Hospitalizations:   Any hospitalizations or ED visits within the last 12 months?: Yes    How many hospitalizations have you had in the last year?: 1-2    Advance Care Planning:   Living will: Yes    Durable POA for healthcare:  Yes    Advanced directive: Yes    Advanced directive counseling given: Yes    End of Life Decisions reviewed with patient: Yes    Provider agrees with end of life decisions: Yes      PREVENTIVE SCREENINGS      Cardiovascular Screening:    General: Screening Not Indicated and History Lipid Disorder      Diabetes Screening:     General: Screening Current      Colorectal Cancer Screening:     General: Screening Not Indicated      Breast Cancer Screening:     General: Risks and Benefits Discussed      Cervical Cancer Screening:    General: Screening Not Indicated      Osteoporosis Screening:    General: Risks and Benefits Discussed      Abdominal Aortic Aneurysm (AAA) Screening:      Due for: Screening AAA Ultrasound      Lung Cancer Screening:     General: Screening Current      Juan Diego Lurlene Aase, MD

## 2019-11-22 NOTE — PATIENT INSTRUCTIONS
Medicare Preventive Visit Patient Instructions  Thank you for completing your Welcome to Medicare Visit or Medicare Annual Wellness Visit today  Your next wellness visit will be due in one year (11/22/2020)  The screening/preventive services that you may require over the next 5-10 years are detailed below  Some tests may not apply to you based off risk factors and/or age  Screening tests ordered at today's visit but not completed yet may show as past due  Also, please note that scanned in results may not display below  Preventive Screenings:  Service Recommendations Previous Testing/Comments   Colorectal Cancer Screening  * Colonoscopy    * Fecal Occult Blood Test (FOBT)/Fecal Immunochemical Test (FIT)  * Fecal DNA/Cologuard Test  * Flexible Sigmoidoscopy Age: 54-65 years old   Colonoscopy: every 10 years (may be performed more frequently if at higher risk)  OR  FOBT/FIT: every 1 year  OR  Cologuard: every 3 years  OR  Sigmoidoscopy: every 5 years  Screening may be recommended earlier than age 48 if at higher risk for colorectal cancer  Also, an individualized decision between you and your healthcare provider will decide whether screening between the ages of 74-80 would be appropriate  Colonoscopy: Not on file  FOBT/FIT: Not on file  Cologuard: Not on file  Sigmoidoscopy: Not on file         Breast Cancer Screening Age: 36 years old  Frequency: every 1-2 years  Not required if history of left and right mastectomy Mammogram: Not on file       Cervical Cancer Screening Between the ages of 21-29, pap smear recommended once every 3 years  Between the ages of 33-67, can perform pap smear with HPV co-testing every 5 years     Recommendations may differ for women with a history of total hysterectomy, cervical cancer, or abnormal pap smears in past  Pap Smear: Not on file    Screening Not Indicated   Hepatitis C Screening Once for adults born between [facility]  More frequently in patients at high risk for Hepatitis C Hep C Antibody: Not on file       Diabetes Screening 1-2 times per year if you're at risk for diabetes or have pre-diabetes Fasting glucose: 79 mg/dL   A1C: No results in last 5 years    Screening Current   Cholesterol Screening Once every 5 years if you don't have a lipid disorder  May order more often based on risk factors  Lipid panel: Not on file    Screening Not Indicated  History Lipid Disorder     Other Preventive Screenings Covered by Medicare:  1  Abdominal Aortic Aneurysm (AAA) Screening: covered once if your at risk  You're considered to be at risk if you have a family history of AAA  2  Lung Cancer Screening: covers low dose CT scan once per year if you meet all of the following conditions: (1) Age 50-69; (2) No signs or symptoms of lung cancer; (3) Current smoker or have quit smoking within the last 15 years; (4) You have a tobacco smoking history of at least 30 pack years (packs per day multiplied by number of years you smoked); (5) You get a written order from a healthcare provider  3  Glaucoma Screening: covered annually if you're considered high risk: (1) You have diabetes OR (2) Family history of glaucoma OR (3)  aged 48 and older OR (3)  American aged 72 and older  3  Osteoporosis Screening: covered every 2 years if you meet one of the following conditions: (1) You're estrogen deficient and at risk for osteoporosis based off medical history and other findings; (2) Have a vertebral abnormality; (3) On glucocorticoid therapy for more than 3 months; (4) Have primary hyperparathyroidism; (5) On osteoporosis medications and need to assess response to drug therapy  · Last bone density test (DXA Scan): Not on file  5  HIV Screening: covered annually if you're between the age of 12-76  Also covered annually if you are younger than 13 and older than 72 with risk factors for HIV infection   For pregnant patients, it is covered up to 3 times per pregnancy  Immunizations:  Immunization Recommendations   Influenza Vaccine Annual influenza vaccination during flu season is recommended for all persons aged >= 6 months who do not have contraindications   Pneumococcal Vaccine (Prevnar and Pneumovax)  * Prevnar = PCV13  * Pneumovax = PPSV23   Adults 25-60 years old: 1-3 doses may be recommended based on certain risk factors  Adults 72 years old: Prevnar (PCV13) vaccine recommended followed by Pneumovax (PPSV23) vaccine  If already received PPSV23 since turning 65, then PCV13 recommended at least one year after PPSV23 dose  Hepatitis B Vaccine 3 dose series if at intermediate or high risk (ex: diabetes, end stage renal disease, liver disease)   Tetanus (Td) Vaccine - COST NOT COVERED BY MEDICARE PART B Following completion of primary series, a booster dose should be given every 10 years to maintain immunity against tetanus  Td may also be given as tetanus wound prophylaxis  Tdap Vaccine - COST NOT COVERED BY MEDICARE PART B Recommended at least once for all adults  For pregnant patients, recommended with each pregnancy  Shingles Vaccine (Shingrix) - COST NOT COVERED BY MEDICARE PART B  2 shot series recommended in those aged 48 and above     Health Maintenance Due:  There are no preventive care reminders to display for this patient  Immunizations Due:      Topic Date Due    Pneumococcal Vaccine: 65+ Years (1 of 2 - PCV13) 03/22/2001     Advance Directives   What are advance directives? Advance directives are legal documents that state your wishes and plans for medical care  These plans are made ahead of time in case you lose your ability to make decisions for yourself  Advance directives can apply to any medical decision, such as the treatments you want, and if you want to donate organs  What are the types of advance directives? There are many types of advance directives, and each state has rules about how to use them   You may choose a combination of any of the following:  · Living will: This is a written record of the treatment you want  You can also choose which treatments you do not want, which to limit, and which to stop at a certain time  This includes surgery, medicine, IV fluid, and tube feedings  · Durable power of  for healthcare [facility]): This is a written record that states who you want to make healthcare choices for you when you are unable to make them for yourself  This person, called a proxy, is usually a family member or a friend  You may choose more than 1 proxy  · Do not resuscitate (DNR) order:  A DNR order is used in case your heart stops beating or you stop breathing  It is a request not to have certain forms of treatment, such as CPR  A DNR order may be included in other types of advance directives  · Medical directive: This covers the care that you want if you are in a coma, near death, or unable to make decisions for yourself  You can list the treatments you want for each condition  Treatment may include pain medicine, surgery, blood transfusions, dialysis, IV or tube feedings, and a ventilator (breathing machine)  · Values history: This document has questions about your views, beliefs, and how you feel and think about life  This information can help others choose the care that you would choose  Why are advance directives important? An advance directive helps you control your care  Although spoken wishes may be used, it is better to have your wishes written down  Spoken wishes can be misunderstood, or not followed  Treatments may be given even if you do not want them  An advance directive may make it easier for your family to make difficult choices about your care  Depression   Depression  is a medical condition that causes feelings of sadness or hopelessness that do not go away  Depression may cause you to lose interest in things you used to enjoy  These feelings may interfere with your daily life    Call your local emergency number (911 in the [address]) if:   · You think about harming yourself or someone else  · You have done something on purpose to hurt yourself  The following resources are available at any time to help you, if needed:   · [address]: 0-122.866.6831 (8-715-728-IVPU)   · Suicide Hotline: 0-496.336.4967 (9-836-JLGCPOA)   · For a list of international numbers: https://save org/find-help/international-resources/  Treatment for depression may include medicine to relieve depression  Medicine is often used together with therapy  Therapy is a way for you to talk about your feelings and anything that may be causing depression  Therapy can be done alone or in a group  It may also be done with family members or a significant other  · Get regular physical activity  · Create a regular sleep schedule  · Eat a variety of healthy foods  · Do not drink alcohol or use drugs  Urinary Incontinence   Urinary incontinence (UI)  is when you lose control of your bladder  UI develops because your bladder cannot store or empty urine properly  The 3 most common types of UI are stress incontinence, urge incontinence, or both  Medicines:   · May be given to help strengthen your bladder control  Report any side effects of medication to your healthcare provider  Do pelvic muscle exercises often:  Your pelvic muscles help you stop urinating  Squeeze these muscles tight for 5 seconds, then relax for 5 seconds  Gradually work up to squeezing for 10 seconds  Do 3 sets of 15 repetitions a day, or as directed  This will help strengthen your pelvic muscles and improve bladder control  Train your bladder:  Go to the bathroom at set times, such as every 2 hours, even if you do not feel the urge to go  You can also try to hold your urine when you feel the urge to go  For example, hold your urine for 5 minutes when you feel the urge to go  As that becomes easier, hold your urine for 10 minutes     Self-care: · Keep a UI record  Write down how often you leak urine and how much you leak  Make a note of what you were doing when you leaked urine  · Drink liquids as directed  You may need to limit the amount of liquid you drink to help control your urine leakage  Do not drink any liquid right before you go to bed  Limit or do not have drinks that contain caffeine or alcohol  · Prevent constipation  Eat a variety of high-fiber foods  Good examples are high-fiber cereals, beans, vegetables, and whole-grain breads  Walking is the best way to trigger your intestines to have a bowel movement  · Exercise regularly and maintain a healthy weight  Weight loss and exercise will decrease pressure on your bladder and help you control your leakage  · Use a catheter as directed  to help empty your bladder  A catheter is a tiny, plastic tube that is put into your bladder to drain your urine  · Go to behavior therapy as directed  Behavior therapy may be used to help you learn to control your urge to urinate  Weight Management   Why it is important to manage your weight:  Being overweight increases your risk of health conditions such as heart disease, high blood pressure, type 2 diabetes, and certain types of cancer  It can also increase your risk for osteoarthritis, sleep apnea, and other respiratory problems  Aim for a slow, steady weight loss  Even a small amount of weight loss can lower your risk of health problems  How to lose weight safely:  A safe and healthy way to lose weight is to eat fewer calories and get regular exercise  You can lose up about 1 pound a week by decreasing the number of calories you eat by 500 calories each day  Healthy meal plan for weight management:  A healthy meal plan includes a variety of foods, contains fewer calories, and helps you stay healthy  A healthy meal plan includes the following:  · Eat whole-grain foods more often  A healthy meal plan should contain fiber   Fiber is the part of grains, fruits, and vegetables that is not broken down by your body  Whole-grain foods are healthy and provide extra fiber in your diet  Some examples of whole-grain foods are whole-wheat breads and pastas, oatmeal, brown rice, and bulgur  · Eat a variety of vegetables every day  Include dark, leafy greens such as spinach, kale, erin greens, and mustard greens  Eat yellow and orange vegetables such as carrots, sweet potatoes, and winter squash  · Eat a variety of fruits every day  Choose fresh or canned fruit (canned in its own juice or light syrup) instead of juice  Fruit juice has very little or no fiber  · Eat low-fat dairy foods  Drink fat-free (skim) milk or 1% milk  Eat fat-free yogurt and low-fat cottage cheese  Try low-fat cheeses such as mozzarella and other reduced-fat cheeses  · Choose meat and other protein foods that are low in fat  Choose beans or other legumes such as split peas or lentils  Choose fish, skinless poultry (chicken or turkey), or lean cuts of red meat (beef or pork)  Before you cook meat or poultry, cut off any visible fat  · Use less fat and oil  Try baking foods instead of frying them  Add less fat, such as margarine, sour cream, regular salad dressing and mayonnaise to foods  Eat fewer high-fat foods  Some examples of high-fat foods include french fries, doughnuts, ice cream, and cakes  · Eat fewer sweets  Limit foods and drinks that are high in sugar  This includes candy, cookies, regular soda, and sweetened drinks  Exercise:  Exercise at least 30 minutes per day on most days of the week  Some examples of exercise include walking, biking, dancing, and swimming  You can also fit in more physical activity by taking the stairs instead of the elevator or parking farther away from stores  Ask your healthcare provider about the best exercise plan for you        © Copyright [employer] 2018 Information is for End User's use only and may not be sold, redistributed or otherwise used for commercial purposes  All illustrations and images included in CareNotes® are the copyrighted property of [email or web address]  or [facility] Preventive Visit Patient Instructions  Thank you for completing your Welcome to Medicare Visit or Medicare Annual Wellness Visit today  Your next wellness visit will be due in one year (11/22/2020)  The screening/preventive services that you may require over the next 5-10 years are detailed below  Some tests may not apply to you based off risk factors and/or age  Screening tests ordered at today's visit but not completed yet may show as past due  Also, please note that scanned in results may not display below  Preventive Screenings:  Service Recommendations Previous Testing/Comments   Colorectal Cancer Screening  * Colonoscopy    * Fecal Occult Blood Test (FOBT)/Fecal Immunochemical Test (FIT)  * Fecal DNA/Cologuard Test  * Flexible Sigmoidoscopy Age: 54-65 years old   Colonoscopy: every 10 years (may be performed more frequently if at higher risk)  OR  FOBT/FIT: every 1 year  OR  Cologuard: every 3 years  OR  Sigmoidoscopy: every 5 years  Screening may be recommended earlier than age 48 if at higher risk for colorectal cancer  Also, an individualized decision between you and your healthcare provider will decide whether screening between the ages of 74-80 would be appropriate  Colonoscopy: Not on file  FOBT/FIT: Not on file  Cologuard: Not on file  Sigmoidoscopy: Not on file         Breast Cancer Screening Age: 36 years old  Frequency: every 1-2 years  Not required if history of left and right mastectomy Mammogram: Not on file       Cervical Cancer Screening Between the ages of 21-29, pap smear recommended once every 3 years  Between the ages of 33-67, can perform pap smear with HPV co-testing every 5 years     Recommendations may differ for women with a history of total hysterectomy, cervical cancer, or abnormal pap smears in past  Pap Smear: Not on file    Screening Not Indicated   Hepatitis C Screening Once for adults born between 1945 and 1965  More frequently in patients at high risk for Hepatitis C Hep C Antibody: Not on file       Diabetes Screening 1-2 times per year if you're at risk for diabetes or have pre-diabetes Fasting glucose: 79 mg/dL   A1C: No results in last 5 years    Screening Current   Cholesterol Screening Once every 5 years if you don't have a lipid disorder  May order more often based on risk factors  Lipid panel: Not on file    Screening Not Indicated  History Lipid Disorder     Other Preventive Screenings Covered by Medicare:  6  Abdominal Aortic Aneurysm (AAA) Screening: covered once if your at risk  You're considered to be at risk if you have a family history of AAA  7  Lung Cancer Screening: covers low dose CT scan once per year if you meet all of the following conditions: (1) Age 50-69; (2) No signs or symptoms of lung cancer; (3) Current smoker or have quit smoking within the last 15 years; (4) You have a tobacco smoking history of at least 30 pack years (packs per day multiplied by number of years you smoked); (5) You get a written order from a healthcare provider  8  Glaucoma Screening: covered annually if you're considered high risk: (1) You have diabetes OR (2) Family history of glaucoma OR (3)  aged 48 and older OR (3)  American aged 72 and older  5  Osteoporosis Screening: covered every 2 years if you meet one of the following conditions: (1) You're estrogen deficient and at risk for osteoporosis based off medical history and other findings; (2) Have a vertebral abnormality; (3) On glucocorticoid therapy for more than 3 months; (4) Have primary hyperparathyroidism; (5) On osteoporosis medications and need to assess response to drug therapy  · Last bone density test (DXA Scan): Not on file  10  HIV Screening: covered annually if you're between the age of 12-76   Also covered annually if you are younger than 13 and older than 72 with risk factors for HIV infection  For pregnant patients, it is covered up to 3 times per pregnancy  Immunizations:  Immunization Recommendations   Influenza Vaccine Annual influenza vaccination during flu season is recommended for all persons aged >= 6 months who do not have contraindications   Pneumococcal Vaccine (Prevnar and Pneumovax)  * Prevnar = PCV13  * Pneumovax = PPSV23   Adults 25-60 years old: 1-3 doses may be recommended based on certain risk factors  Adults 72 years old: Prevnar (PCV13) vaccine recommended followed by Pneumovax (PPSV23) vaccine  If already received PPSV23 since turning 65, then PCV13 recommended at least one year after PPSV23 dose  Hepatitis B Vaccine 3 dose series if at intermediate or high risk (ex: diabetes, end stage renal disease, liver disease)   Tetanus (Td) Vaccine - COST NOT COVERED BY MEDICARE PART B Following completion of primary series, a booster dose should be given every 10 years to maintain immunity against tetanus  Td may also be given as tetanus wound prophylaxis  Tdap Vaccine - COST NOT COVERED BY MEDICARE PART B Recommended at least once for all adults  For pregnant patients, recommended with each pregnancy  Shingles Vaccine (Shingrix) - COST NOT COVERED BY MEDICARE PART B  2 shot series recommended in those aged 48 and above     Health Maintenance Due:  There are no preventive care reminders to display for this patient  Immunizations Due:      Topic Date Due    Pneumococcal Vaccine: 65+ Years (1 of 2 - PCV13) 03/22/2001     Advance Directives   What are advance directives? Advance directives are legal documents that state your wishes and plans for medical care  These plans are made ahead of time in case you lose your ability to make decisions for yourself  Advance directives can apply to any medical decision, such as the treatments you want, and if you want to donate organs     What are the types of advance directives? There are many types of advance directives, and each state has rules about how to use them  You may choose a combination of any of the following:  · Living will: This is a written record of the treatment you want  You can also choose which treatments you do not want, which to limit, and which to stop at a certain time  This includes surgery, medicine, IV fluid, and tube feedings  · Durable power of  for healthcare [facility]): This is a written record that states who you want to make healthcare choices for you when you are unable to make them for yourself  This person, called a proxy, is usually a family member or a friend  You may choose more than 1 proxy  · Do not resuscitate (DNR) order:  A DNR order is used in case your heart stops beating or you stop breathing  It is a request not to have certain forms of treatment, such as CPR  A DNR order may be included in other types of advance directives  · Medical directive: This covers the care that you want if you are in a coma, near death, or unable to make decisions for yourself  You can list the treatments you want for each condition  Treatment may include pain medicine, surgery, blood transfusions, dialysis, IV or tube feedings, and a ventilator (breathing machine)  · Values history: This document has questions about your views, beliefs, and how you feel and think about life  This information can help others choose the care that you would choose  Why are advance directives important? An advance directive helps you control your care  Although spoken wishes may be used, it is better to have your wishes written down  Spoken wishes can be misunderstood, or not followed  Treatments may be given even if you do not want them  An advance directive may make it easier for your family to make difficult choices about your care  Depression   Depression  is a medical condition that causes feelings of sadness or hopelessness that do not go away  Depression may cause you to lose interest in things you used to enjoy  These feelings may interfere with your daily life  Call your local emergency number (911 in the [address]) if:   · You think about harming yourself or someone else  · You have done something on purpose to hurt yourself  The following resources are available at any time to help you, if needed:   · [address]: 9-507.688.7617 (0-106-106-CXJX)   · Suicide Hotline: 8-919.505.5650 (5-965-FSJVSGA)   · For a list of international numbers: https://save org/find-help/international-resources/  Treatment for depression may include medicine to relieve depression  Medicine is often used together with therapy  Therapy is a way for you to talk about your feelings and anything that may be causing depression  Therapy can be done alone or in a group  It may also be done with family members or a significant other  · Get regular physical activity  · Create a regular sleep schedule  · Eat a variety of healthy foods  · Do not drink alcohol or use drugs  Urinary Incontinence   Urinary incontinence (UI)  is when you lose control of your bladder  UI develops because your bladder cannot store or empty urine properly  The 3 most common types of UI are stress incontinence, urge incontinence, or both  Medicines:   · May be given to help strengthen your bladder control  Report any side effects of medication to your healthcare provider  Do pelvic muscle exercises often:  Your pelvic muscles help you stop urinating  Squeeze these muscles tight for 5 seconds, then relax for 5 seconds  Gradually work up to squeezing for 10 seconds  Do 3 sets of 15 repetitions a day, or as directed  This will help strengthen your pelvic muscles and improve bladder control  Train your bladder:  Go to the bathroom at set times, such as every 2 hours, even if you do not feel the urge to go  You can also try to hold your urine when you feel the urge to go   For example, hold your urine for 5 minutes when you feel the urge to go  As that becomes easier, hold your urine for 10 minutes  Self-care:   · Keep a UI record  Write down how often you leak urine and how much you leak  Make a note of what you were doing when you leaked urine  · Drink liquids as directed  You may need to limit the amount of liquid you drink to help control your urine leakage  Do not drink any liquid right before you go to bed  Limit or do not have drinks that contain caffeine or alcohol  · Prevent constipation  Eat a variety of high-fiber foods  Good examples are high-fiber cereals, beans, vegetables, and whole-grain breads  Walking is the best way to trigger your intestines to have a bowel movement  · Exercise regularly and maintain a healthy weight  Weight loss and exercise will decrease pressure on your bladder and help you control your leakage  · Use a catheter as directed  to help empty your bladder  A catheter is a tiny, plastic tube that is put into your bladder to drain your urine  · Go to behavior therapy as directed  Behavior therapy may be used to help you learn to control your urge to urinate  Weight Management   Why it is important to manage your weight:  Being overweight increases your risk of health conditions such as heart disease, high blood pressure, type 2 diabetes, and certain types of cancer  It can also increase your risk for osteoarthritis, sleep apnea, and other respiratory problems  Aim for a slow, steady weight loss  Even a small amount of weight loss can lower your risk of health problems  How to lose weight safely:  A safe and healthy way to lose weight is to eat fewer calories and get regular exercise  You can lose up about 1 pound a week by decreasing the number of calories you eat by 500 calories each day  Healthy meal plan for weight management:  A healthy meal plan includes a variety of foods, contains fewer calories, and helps you stay healthy   A healthy meal plan includes the following:  · Eat whole-grain foods more often  A healthy meal plan should contain fiber  Fiber is the part of grains, fruits, and vegetables that is not broken down by your body  Whole-grain foods are healthy and provide extra fiber in your diet  Some examples of whole-grain foods are whole-wheat breads and pastas, oatmeal, brown rice, and bulgur  · Eat a variety of vegetables every day  Include dark, leafy greens such as spinach, kale, erin greens, and mustard greens  Eat yellow and orange vegetables such as carrots, sweet potatoes, and winter squash  · Eat a variety of fruits every day  Choose fresh or canned fruit (canned in its own juice or light syrup) instead of juice  Fruit juice has very little or no fiber  · Eat low-fat dairy foods  Drink fat-free (skim) milk or 1% milk  Eat fat-free yogurt and low-fat cottage cheese  Try low-fat cheeses such as mozzarella and other reduced-fat cheeses  · Choose meat and other protein foods that are low in fat  Choose beans or other legumes such as split peas or lentils  Choose fish, skinless poultry (chicken or turkey), or lean cuts of red meat (beef or pork)  Before you cook meat or poultry, cut off any visible fat  · Use less fat and oil  Try baking foods instead of frying them  Add less fat, such as margarine, sour cream, regular salad dressing and mayonnaise to foods  Eat fewer high-fat foods  Some examples of high-fat foods include french fries, doughnuts, ice cream, and cakes  · Eat fewer sweets  Limit foods and drinks that are high in sugar  This includes candy, cookies, regular soda, and sweetened drinks  Exercise:  Exercise at least 30 minutes per day on most days of the week  Some examples of exercise include walking, biking, dancing, and swimming  You can also fit in more physical activity by taking the stairs instead of the elevator or parking farther away from stores   Ask your healthcare provider about the best exercise plan for you  © Copyright [employer] 2018 Information is for End User's use only and may not be sold, redistributed or otherwise used for commercial purposes   All illustrations and images included in CareNotes® are the copyrighted property of A D A M , Inc  or [address] [place], [place] [address]olman isabel

## 2019-11-24 LAB — LEVETIRACETAM SERPL-MCNC: 62 UG/ML (ref 10–40)

## 2019-11-25 ENCOUNTER — TELEPHONE (OUTPATIENT)
Dept: NEUROLOGY | Facility: CLINIC | Age: 84
End: 2019-11-25

## 2019-11-25 DIAGNOSIS — G40.209 PARTIAL SYMPTOMATIC EPILEPSY WITH COMPLEX PARTIAL SEIZURES, NOT INTRACTABLE, WITHOUT STATUS EPILEPTICUS (HCC): Primary | ICD-10-CM

## 2019-11-25 RX ORDER — DIVALPROEX SODIUM 500 MG/1
500 TABLET, EXTENDED RELEASE ORAL DAILY
Qty: 30 TABLET | Refills: 5 | Status: SHIPPED | OUTPATIENT
Start: 2019-11-25 | End: 2020-01-27 | Stop reason: SDUPTHER

## 2019-11-25 NOTE — TELEPHONE ENCOUNTER
Called and spoke with Thomas Landry  Have recommended decreasing Keppra to a 1000 mg twice daily and change Depakote to the extended release taking 500 mg at bedtime  It is unclear whether her lethargy is related to medication effect  Seemed to begin after urinary tract infection and a week or 2 after her Keppra dose was increased to 1500 mg twice daily  Will make these modifications in hopes to determine if the medication is contributing to her lethargy

## 2019-11-25 NOTE — TELEPHONE ENCOUNTER
Patient's daughter Mary Beth Score (states she would like included that she is Darwin Primrose Defino's wife) calling regarding patient's recent labs  Please see valproic acid and levetiracetem level in chart  She is concerned about Keppra level being high  She reports the patient is extremely tired and can barely keep her eyes open  Also reports her mobility is worse  She reports the patient's last seizure was the beginning of November  States patient has a staring episode lasting a few seconds  This can occur about 2 times per month   States the seizures are "very mild "     Keppra 1,250 BID  Depakote 250mg BID    [phone]

## 2019-12-01 DIAGNOSIS — G40.209 PARTIAL SYMPTOMATIC EPILEPSY WITH COMPLEX PARTIAL SEIZURES, NOT INTRACTABLE, WITHOUT STATUS EPILEPTICUS (HCC): ICD-10-CM

## 2019-12-02 ENCOUNTER — PATIENT OUTREACH (OUTPATIENT)
Dept: FAMILY MEDICINE CLINIC | Facility: CLINIC | Age: 84
End: 2019-12-02

## 2019-12-02 RX ORDER — DIVALPROEX SODIUM 250 MG/1
TABLET, DELAYED RELEASE ORAL
Qty: 120 TABLET | Refills: 6 | Status: SHIPPED | OUTPATIENT
Start: 2019-12-02 | End: 2020-01-27
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
## 2019-12-02 NOTE — PROGRESS NOTES
Spoke to patient's daughter and [facility])  Franklin Pérez stated that Saud Vaughn is well  Vitals have been stable  Did attend f/u appointment with PCP  Asked her if she was able to follow up with urologist per PCP orders due to her frequent UTIs  She stated that she did make an appointment for urologist for tomorrow  She stated that she has gained a little more strength after  Dr Thu Ayala adjusted her keppra has noticed a better difference in her  She has been tolerating her prescribed medications  Did educate on importance of medication adherence and good medication management  She verbalized understanding  Also discussed s/s of sepsis and importance of symptom monitoring and importance of attending appointments  She verbalized understanding  Continues to have contact information if need be  Will follow up

## 2019-12-03 ENCOUNTER — OFFICE VISIT (OUTPATIENT)
Dept: UROLOGY | Facility: CLINIC | Age: 84
End: 2019-12-03
Payer: MEDICARE

## 2019-12-03 ENCOUNTER — APPOINTMENT (OUTPATIENT)
Dept: LAB | Facility: HOSPITAL | Age: 84
End: 2019-12-03
Payer: MEDICARE

## 2019-12-03 VITALS
HEIGHT: 66 IN | BODY MASS INDEX: 25.88 KG/M2 | DIASTOLIC BLOOD PRESSURE: 58 MMHG | HEART RATE: 57 BPM | WEIGHT: 161 LBS | SYSTOLIC BLOOD PRESSURE: 110 MMHG

## 2019-12-03 DIAGNOSIS — N39.0 RECURRENT UTI: Primary | ICD-10-CM

## 2019-12-03 LAB — POST-VOID RESIDUAL VOLUME, ML POC: 36 ML

## 2019-12-03 PROCEDURE — 87186 SC STD MICRODIL/AGAR DIL: CPT | Performed by: PHYSICIAN ASSISTANT

## 2019-12-03 PROCEDURE — 99203 OFFICE O/P NEW LOW 30 MIN: CPT | Performed by: PHYSICIAN ASSISTANT

## 2019-12-03 PROCEDURE — 51798 US URINE CAPACITY MEASURE: CPT | Performed by: PHYSICIAN ASSISTANT

## 2019-12-03 PROCEDURE — 87086 URINE CULTURE/COLONY COUNT: CPT | Performed by: PHYSICIAN ASSISTANT

## 2019-12-03 PROCEDURE — 87077 CULTURE AEROBIC IDENTIFY: CPT | Performed by: PHYSICIAN ASSISTANT

## 2019-12-03 RX ORDER — LEVETIRACETAM 500 MG/1
1000 TABLET ORAL EVERY 12 HOURS SCHEDULED
COMMUNITY
Start: 2019-11-25 | End: 2020-02-16

## 2019-12-05 ENCOUNTER — TELEPHONE (OUTPATIENT)
Dept: UROLOGY | Facility: CLINIC | Age: 84
End: 2019-12-05

## 2019-12-05 DIAGNOSIS — N39.0 RECURRENT UTI: Primary | ICD-10-CM

## 2019-12-05 LAB — BACTERIA UR CULT: ABNORMAL

## 2019-12-05 RX ORDER — NITROFURANTOIN 25; 75 MG/1; MG/1
100 CAPSULE ORAL 2 TIMES DAILY
Qty: 10 CAPSULE | Refills: 0 | Status: SHIPPED | OUTPATIENT
Start: 2019-12-05 | End: 2019-12-10

## 2019-12-05 RX ORDER — NITROFURANTOIN MACROCRYSTALS 50 MG/1
50 CAPSULE ORAL DAILY
Qty: 90 CAPSULE | Refills: 3 | Status: SHIPPED | OUTPATIENT
Start: 2019-12-05 | End: 2019-12-09 | Stop reason: SDUPTHER

## 2019-12-05 NOTE — TELEPHONE ENCOUNTER
Called and spoke to patients daughter Allyson Clark  Made her aware that patient does have a urinary tract infection and that a prescription of nitrofurantoin 100 mg twice daily to be taken for 5 days was sent in  Made her aware that they have additionally sent a prescription of nitrofurantoin 50 mg to be taken once daily indefinitely for prophylaxis which should be initiated upon completion of the 5 day course  Allyson Clark verbalized understanding and denies any other questions or concerns

## 2019-12-05 NOTE — TELEPHONE ENCOUNTER
----- Message from Teodoro Soto PA-C sent at 12/5/2019 12:46 PM EST -----  Patient does have a urinary tract infection  I have sent a prescription of nitrofurantoin 100 mg twice daily to be taken for 5 days  I have additionally sent a prescription of nitrofurantoin 50 mg to be taken once daily indefinitely for prophylaxis   which should be initiated upon completion of the 5 day course

## 2019-12-06 ENCOUNTER — HOSPITAL ENCOUNTER (OUTPATIENT)
Dept: ULTRASOUND IMAGING | Facility: HOSPITAL | Age: 84
Discharge: HOME | End: 2019-12-06
Attending: FAMILY MEDICINE
Payer: MEDICARE

## 2019-12-06 DIAGNOSIS — N83.209 CYST OF OVARY, UNSPECIFIED LATERALITY: ICD-10-CM

## 2019-12-06 PROCEDURE — 76856 US EXAM PELVIC COMPLETE: CPT

## 2019-12-09 ENCOUNTER — TELEPHONE (OUTPATIENT)
Dept: UROLOGY | Facility: AMBULATORY SURGERY CENTER | Age: 84
End: 2019-12-09

## 2019-12-09 ENCOUNTER — TELEPHONE (OUTPATIENT)
Dept: UROLOGY | Facility: CLINIC | Age: 84
End: 2019-12-09

## 2019-12-09 DIAGNOSIS — N39.0 RECURRENT UTI: ICD-10-CM

## 2019-12-09 RX ORDER — NITROFURANTOIN MACROCRYSTALS 50 MG/1
50 CAPSULE ORAL DAILY
Qty: 90 CAPSULE | Refills: 3 | Status: SHIPPED | OUTPATIENT
Start: 2019-12-09 | End: 2020-03-19 | Stop reason: ALTCHOICE

## 2019-12-09 NOTE — TELEPHONE ENCOUNTER
Sent family member back a message in [employer] with the below information as this is how they contacted us and prefer to be contacted

## 2019-12-09 NOTE — TELEPHONE ENCOUNTER
----- Message from Josafat Roche sent at 12/9/2019  9:21 AM EST -----  Regarding: RE: Visit Follow-Up Question  Contact: [phone]  Can you please call this prescription into [facility], which is the mail away pharmacy we use  I have the information on my mom's My Chart  Once, it is called in by your office, I will contact [facility] for pricing  Thank you  ----- Message -----  From: Nurse Sabrina Fajardo  Sent: 12/9/2019  9:14 AM EST  To: Josafat Roche  Subject: RE: Visit Follow-Up Question  Good morning  Per our physician assistant Lena Bolivar PA-C she would recommend continuing prophylactic nitrofurantoin as some other medications have increased risk of yeast infections associated with them  Patient you could reach out to her prescription plan to see what the cost would be at a mail away pharmacy, she could also try good RX as well          ----- Message -----     From: Josafat Roche     Sent: 12/6/2019  6:08 PM EST       To: Teodoro Soto PA-C  Subject: RE: Visit Follow-Up Question    picked up the prescription for mom for the higher dose of antibiotics, which she started today  but the lower dose, which she will be taking on a daily basis after finishing the higher dose, was $135    Unfortunately, this is not affordable for my mom   is there a genetic of this prescription? If not, any suggestions as to what we can do to have something more affordable  After the initial prescription being filled at [address], [place], I will be using the mail order pharmacy, which is listed on my mom's chart  I don't know if it would be cheaper with the [employer] in company? ?/  ----- Message -----  From: Nurse Veronica Amesdy: 12/6/2019  9:08 AM EST  To: Josafat Roche  Subject: RE: Visit Follow-Up Question  She should follow up in 3 months  You can call the office at [phone] to schedule this       The daily does of antibiotics is prophylactic to help prevent future infections but there is a possibility that she could get an infection even with taking this      ----- Message -----     From: Chris Haile     Sent: 12/6/2019  8:59 AM EST       To: Nelly Son PA-C  Subject: Visit Follow-Up Question    Good morning Gordon, We did receive the results that mom does have another URI  She is starting the antibiotics today  I did not schedule a follow up with your office  When would you like to see my mom again? and could you make the appointment for me  I understand that by mom taking the daily dose of antibiotics is no guarantee of not getting another UTI, is that true? Thank you for your help and guidance        Donovan Santiago

## 2019-12-09 NOTE — TELEPHONE ENCOUNTER
Please see following message from the patient's [facility] for her prescription  Review and advise

## 2019-12-09 NOTE — TELEPHONE ENCOUNTER
I would recommend continuing prophylactic nitrofurantoin as some other medications have increased risk of yeast infections associated with them  Please provide patient with information about good Rx as it appears a 90 day supply from [facility] would be approximately 50 dollars per research  Patient should also reach out to her prescription plan to see with a cost at a male away pharmacy would be  Thank you         ----- Message from Colt Ramirez RN sent at 12/9/2019  7:57 AM EST -----  Regarding: FW: Visit Follow-Up Question  Contact: [phone]      ----- Message -----  From: Chris Haile  Sent: 12/6/2019   6:08 PM EST  To: [facility]  Subject: RE: Visit Follow-Up Question                     picked up the prescription for mom for the higher dose of antibiotics, which she started today  but the lower dose, which she will be taking on a daily basis after finishing the higher dose, was $135    Unfortunately, this is not affordable for my mom   is there a genetic of this prescription? If not, any suggestions as to what we can do to have something more affordable  After the initial prescription being filled at [address] [place] [address], I will be using the mail order pharmacy, which is listed on my mom's chart  I don't know if it would be cheaper with the [employer] in company? ?/  ----- Message -----  From: Nurse Sindy Tomasment: 12/6/2019  9:08 AM EST  To: Chris Haile  Subject: RE: Visit Follow-Up Question  She should follow up in 3 months  You can call the office at [phone] to schedule this       The daily does of antibiotics is prophylactic to help prevent future infections but there is a possibility that she could get an infection even with taking this      ----- Message -----     From: Chris Haile     Sent: 12/6/2019  8:59 AM EST       To: Nelly Son PA-C  Subject: Visit Follow-Up Question    Good morning Damaris Close, We did receive the results that mom does have another URI   She is starting the antibiotics today  I did not schedule a follow up with your office  When would you like to see my mom again? and could you make the appointment for me  I understand that by mom taking the daily dose of antibiotics is no guarantee of not getting another UTI, is that true? Thank you for your help and guidance        Jaylan Osorio

## 2019-12-11 ENCOUNTER — TELEPHONE (OUTPATIENT)
Dept: FAMILY MEDICINE CLINIC | Facility: CLINIC | Age: 84
End: 2019-12-11

## 2019-12-16 DIAGNOSIS — F03.90 DEMENTIA WITHOUT BEHAVIORAL DISTURBANCE, UNSPECIFIED DEMENTIA TYPE (HCC): Primary | ICD-10-CM

## 2020-01-02 ENCOUNTER — PATIENT OUTREACH (OUTPATIENT)
Dept: FAMILY MEDICINE CLINIC | Facility: CLINIC | Age: 85
End: 2020-01-02

## 2020-01-02 ENCOUNTER — OFFICE VISIT (OUTPATIENT)
Dept: PALLIATIVE MEDICINE | Facility: CLINIC | Age: 85
End: 2020-01-02
Payer: MEDICARE

## 2020-01-02 VITALS
RESPIRATION RATE: 20 BRPM | TEMPERATURE: 97.5 F | SYSTOLIC BLOOD PRESSURE: 144 MMHG | HEART RATE: 60 BPM | DIASTOLIC BLOOD PRESSURE: 70 MMHG | OXYGEN SATURATION: 99 %

## 2020-01-02 DIAGNOSIS — Z71.89 GOALS OF CARE, COUNSELING/DISCUSSION: ICD-10-CM

## 2020-01-02 DIAGNOSIS — F03.90 DEMENTIA WITHOUT BEHAVIORAL DISTURBANCE, UNSPECIFIED DEMENTIA TYPE (HCC): Primary | ICD-10-CM

## 2020-01-02 PROCEDURE — 99214 OFFICE O/P EST MOD 30 MIN: CPT | Performed by: INTERNAL MEDICINE

## 2020-01-02 NOTE — PROGRESS NOTES
Spoke to Ibeth Bee (Vaibhav Breeding RP and adult daughter)  She stated that Augustus Alvarez followed up with urology and has been feeling a lot better  Was put on a low dose daily antibiotic as prophylaxis for her recurrent UTIs  Stated she has been more talkative and her mobility is better  Her dementia is progressive however, so she has an appointment this afternoon with palative care and support group  Vitals stable  Her weight has been stable  Stated that she has been eating well  Tolerating her prescribed medications  Continues having  live in aide to assist with her care  Daughter has been caring for her when the aide is off  Currently has no questions or concerns  Continues to have contact information to outpatient care management if need be  Will follow up

## 2020-01-02 NOTE — PROGRESS NOTES
Palliative and Supportive Care   Veronique Wilcox 81 y o  female [id number]    Assessment/Plan:  1  Dementia without behavioral disturbance, unspecified dementia type ([facility] [address] )    2  Goals of care, counseling/discussion      Extensive goals of care planning as below  No planned follow up, but can follow up as needed for questions or concerns  Requested Prescriptions      No prescriptions requested or ordered in this encounter     There are no discontinued medications  Representatives have queried the patient's controlled substance dispensing history in the Prescription Drug Monitoring Program in compliance with regulations before I have prescribed any controlled substances  The prescription history is consistent with prescribed therapy and our practice policies  50 minutes (13:10-14:00) were spent face to face with Veronique Wilcox and her daughter with greater than 50% of the time spent in counseling or coordination of care including discussions of instructions for disease self management, treatment instructions and follow up requirements   All of the patient's questions were answered during this discussion  No follow-ups on file  Subjective:   Chief Complaint  New consultation for:  goal of care assessment and decisional support  HPI     Veronique Wilcox is a 81 y o  female with history of vascular dementia and recent imaging revealing a renal cyst and an ovarian cyst   She follows with Dr Lurlene Aase in [facility] and Dr David Glass in Neurology for her dementia and has had previous life-long seizure activity, controlled on meds  Patient also had 2 admissions for UTIs in the past several months which caused quite a bit of reported delirium  Fortunately her daughter states that over the past few weeks patient has rebounded significantly  She still requires assistance with all ADLs, but is able to move about with a walker at home    She has a 24 hour caregiver who is currently on vacation, and she has been staying with daughter  Daughter endorses moving from [place] for economic reasons and that patient is living in a 1 story home normally with her   She does not show aggression except when she has a UTI  She does continue to have intermittent seizure activity with some lip and tongue smacking followed by episodes of delirium  She is chronically fatigued but has been getting better  The patient herself answers limited questions y/n but is unable to provide any history  The following portions of the medical history were reviewed: past medical history, problem list, medication list, and social history  Current Outpatient Medications:     aspirin 81 MG tablet, Take 1 tablet (81 mg total) by mouth daily, Disp: 30 tablet, Rfl: 5    B Complex-C (B-COMPLEX WITH VITAMIN C) tablet, Take 1 tablet by mouth daily, Disp: , Rfl:     Calcium-Magnesium-Zinc 333-133-5 MG TABS, Take 1 tablet by mouth daily, Disp: , Rfl:     Cholecalciferol (VITAMIN D3) 5000 units CAPS, Take by mouth daily, Disp: , Rfl:     divalproex sodium (DEPAKOTE ER) 500 mg 24 hr tablet, Take 1 tablet (500 mg total) by mouth daily At HS, Disp: 30 tablet, Rfl: 5    divalproex sodium (DEPAKOTE) 250 mg EC tablet, TAKE 1 TABLET BY MOUTH  EVERY 12 HOURS, Disp: 120 tablet, Rfl: 6    docusate sodium (COLACE) 100 mg capsule, Take 1 capsule (100 mg total) by mouth every 12 (twelve) hours for 14 days, Disp: 28 capsule, Rfl: 0    donepezil (ARICEPT) 10 mg tablet, Take 1 tablet (10 mg total) by mouth daily at bedtime, Disp: 30 tablet, Rfl: 1    gabapentin (NEURONTIN) 400 mg capsule, Take 1 capsule (400 mg total) by mouth 2 (two) times a day, Disp: 180 capsule, Rfl: 2    levETIRAcetam (KEPPRA) 500 mg tablet, Take 1,000 mg by mouth every 12 (twelve) hours , Disp: , Rfl:     levETIRAcetam (KEPPRA) 750 mg tablet, Two PO b i d  (Patient taking differently:  Total of 2500 mg daily), Disp: 120 tablet, Rfl: 5    lovastatin (MEVACOR) 20 mg tablet, Take 1 tablet (20 mg total) by mouth daily at bedtime, Disp: 90 tablet, Rfl: 3    memantine (NAMENDA) 10 mg tablet, Take 1 tablet (10 mg total) by mouth 2 (two) times a day, Disp: 180 tablet, Rfl: 3    metoprolol tartrate (LOPRESSOR) 25 mg tablet, TAKE 1 TABLET BY MOUTH  EVERY 12 HOURS, Disp: 180 tablet, Rfl: 1    multivitamin (THERAGRAN) TABS, Take 1 tablet by mouth daily, Disp: , Rfl:     nitrofurantoin (MACRODANTIN) 50 mg capsule, Take 1 capsule (50 mg total) by mouth daily, Disp: 90 capsule, Rfl: 3    polyethylene glycol (MIRALAX) 17 g packet, Take 17 g by mouth daily, Disp: 14 each, Rfl: 0  Review of Systems   Unable to perform ROS: Dementia       All other systems negative    Objective:  Vital Signs  /70 (BP Location: Left arm, Cuff Size: Standard)   Pulse 60   Temp 97 5 °F (36 4 °C) (Oral)   Resp 20   SpO2 99%    Physical Exam    Constitutional: Appears thin, but well-developed and well-nourished  In no acute distress  Head: Normocephalic and atraumatic  Eyes: EOM are normal  Right eye exhibits no discharge  Left eye exhibits no discharge  No scleral icterus  Pulmonary/Chest: Effort normal  No stridor  No respiratory distress  Abdominal: No distension  Musculoskeletal: No edema  Neurological: Alert, oriented and appropriately conversant  Skin: Skin is dry, not diaphoretic  Psychiatric: Displays a normal mood and affect  Behavior, judgement and thought content appear normal    Vitals reviewed

## 2020-01-07 ENCOUNTER — PATIENT OUTREACH (OUTPATIENT)
Dept: FAMILY MEDICINE CLINIC | Facility: CLINIC | Age: 85
End: 2020-01-07

## 2020-01-07 NOTE — PROGRESS NOTES
Patient will now be closed from [facility] program due to episode end date ending on 1/3/20

## 2020-01-27 ENCOUNTER — TELEPHONE (OUTPATIENT)
Dept: NEUROLOGY | Facility: CLINIC | Age: 85
End: 2020-01-27

## 2020-01-27 DIAGNOSIS — G40.209 PARTIAL SYMPTOMATIC EPILEPSY WITH COMPLEX PARTIAL SEIZURES, NOT INTRACTABLE, WITHOUT STATUS EPILEPTICUS (HCC): ICD-10-CM

## 2020-01-27 RX ORDER — DIVALPROEX SODIUM 500 MG/1
500 TABLET, EXTENDED RELEASE ORAL DAILY
Qty: 90 TABLET | Refills: 3 | Status: SHIPPED | OUTPATIENT
Start: 2020-01-27

## 2020-01-27 NOTE — TELEPHONE ENCOUNTER
Called and spoke with Latanya Mcnamara  The 250 mg are not extended release  The 500 mg pill was extended release in [facility]  Have discontinued the 250s and sent the 500 extended-release pills to her mail order pharmacy  In the interim she will  the [facility]    ----- Message from Antoine Mi MA sent at 1/27/2020  8:17 AM EST -----  Regarding: FW: Prescription Question  Contact: [phone]      ----- Message -----  From: Brianna Flores  Sent: 1/24/2020   8:27 PM EST  To: Neurology BEHAVIORAL MEDICINE AT [facility]  Subject: Prescription Question                            Please see my email from 1/22   Please send me an answer in reference to my prescription question for my mom  Her current prescription runs out on Monday       Thank you  Celine Hull

## 2020-01-27 NOTE — TELEPHONE ENCOUNTER
Received call from pt's daughter who states that [facility] called stated that it is coming up "med too soon to fill"  Questioning why if this is now extended release  She also states she is out of medication today so she needs to get this from [facility]  I called Alex who states they have a script there for the 500 mg ER, but it does state too soon can not fill until 4/4/20  States that it must have been filled somewhere  I called insurance at [phone] ID [id number] and spoke with Danielle Espino who states that there was a paid claim today at [facility] and advised that I call [phone]  I called this number and was transferred to [facility] who confirmed there is a paid claim through [facility] for $15, they will receive before 2/4  They stated they will try to reach out to the pharmacy help desk to initiate an override since pt is out of medication  This was initiated for a 30 day override and they can contact their [facility] pharmacy  Naila Nieves made aware         [phone] Naila Nieves

## 2020-01-28 NOTE — TELEPHONE ENCOUNTER
Received a fax from [facility] needing clarification  Questioning if pt is taking divalproex dr 250mg ot depakote er 500mg    I called [facility] and Reviewed below that pt is to be taking depakote er 500mg

## 2020-02-11 DIAGNOSIS — F02.80 ALZHEIMER'S DEMENTIA WITHOUT BEHAVIORAL DISTURBANCE, UNSPECIFIED TIMING OF DEMENTIA ONSET (HCC): ICD-10-CM

## 2020-02-11 DIAGNOSIS — G30.9 ALZHEIMER'S DEMENTIA WITHOUT BEHAVIORAL DISTURBANCE, UNSPECIFIED TIMING OF DEMENTIA ONSET (HCC): ICD-10-CM

## 2020-02-12 ENCOUNTER — APPOINTMENT (OUTPATIENT)
Dept: LAB | Facility: HOSPITAL | Age: 85
End: 2020-02-12
Payer: MEDICARE

## 2020-02-12 ENCOUNTER — TELEPHONE (OUTPATIENT)
Dept: UROLOGY | Facility: MEDICAL CENTER | Age: 85
End: 2020-02-12

## 2020-02-12 DIAGNOSIS — N39.0 RECURRENT UTI: Primary | ICD-10-CM

## 2020-02-12 DIAGNOSIS — N39.0 RECURRENT UTI: ICD-10-CM

## 2020-02-12 LAB
BACTERIA UR QL AUTO: ABNORMAL /HPF
BILIRUB UR QL STRIP: NEGATIVE
CLARITY UR: CLEAR
COLOR UR: YELLOW
GLUCOSE UR STRIP-MCNC: NEGATIVE MG/DL
HGB UR QL STRIP.AUTO: NEGATIVE
KETONES UR STRIP-MCNC: NEGATIVE MG/DL
LEUKOCYTE ESTERASE UR QL STRIP: NEGATIVE
NITRITE UR QL STRIP: POSITIVE
NON-SQ EPI CELLS URNS QL MICRO: ABNORMAL /HPF
PH UR STRIP.AUTO: 6 [PH]
PROT UR STRIP-MCNC: NEGATIVE MG/DL
RBC #/AREA URNS AUTO: ABNORMAL /HPF
SP GR UR STRIP.AUTO: 1.02 (ref 1–1.03)
UROBILINOGEN UR QL STRIP.AUTO: 0.2 E.U./DL
WBC #/AREA URNS AUTO: ABNORMAL /HPF

## 2020-02-12 PROCEDURE — 81001 URINALYSIS AUTO W/SCOPE: CPT

## 2020-02-12 PROCEDURE — 87186 SC STD MICRODIL/AGAR DIL: CPT

## 2020-02-12 PROCEDURE — 87077 CULTURE AEROBIC IDENTIFY: CPT

## 2020-02-12 PROCEDURE — 87086 URINE CULTURE/COLONY COUNT: CPT

## 2020-02-12 RX ORDER — CEPHALEXIN 250 MG/1
250 CAPSULE ORAL EVERY 6 HOURS SCHEDULED
Qty: 20 CAPSULE | Refills: 0 | Status: SHIPPED | OUTPATIENT
Start: 2020-02-12 | End: 2020-02-17

## 2020-02-12 NOTE — TELEPHONE ENCOUNTER
Patient managed by Dr Quenton Severin, last seen 12/03/19 at [facility]  Patient's daughter Brain Band called stating she believes patient may possibly have a UTI  Please call her at [phone]

## 2020-02-12 NOTE — TELEPHONE ENCOUNTER
Patient's urine is concerning for infection  Keflex sent electronically to the pharmacy  Will need to continue monitor culture results  Thank you

## 2020-02-14 ENCOUNTER — TELEPHONE (OUTPATIENT)
Dept: UROLOGY | Facility: CLINIC | Age: 85
End: 2020-02-14

## 2020-02-14 LAB — BACTERIA UR CULT: ABNORMAL

## 2020-02-14 RX ORDER — DONEPEZIL HYDROCHLORIDE 10 MG/1
TABLET, FILM COATED ORAL
Qty: 90 TABLET | Refills: 2 | Status: SHIPPED | OUTPATIENT
Start: 2020-02-14

## 2020-02-14 NOTE — TELEPHONE ENCOUNTER
----- Message from Marla Dejesus PA-C sent at 2/14/2020  9:04 AM EST -----  Patient should complete Keflex as prescribed  Thank you

## 2020-02-14 NOTE — TELEPHONE ENCOUNTER
Called and spoke to patients daughter Karie Holley  Made her aware that patients urine culture is susceptible to Keflex and she should complete this as prescribed  Karie Holley verbalized understanding and denies any other questions or concerns

## 2020-02-16 DIAGNOSIS — G40.909 SEIZURE DISORDER (HCC): ICD-10-CM

## 2020-02-16 RX ORDER — GABAPENTIN 400 MG/1
CAPSULE ORAL
Qty: 180 CAPSULE | Refills: 2 | Status: SHIPPED | OUTPATIENT
Start: 2020-02-16

## 2020-02-16 RX ORDER — LEVETIRACETAM 500 MG/1
TABLET ORAL
Qty: 360 TABLET | Refills: 5 | Status: SHIPPED | OUTPATIENT
Start: 2020-02-16

## 2020-02-19 ENCOUNTER — TELEPHONE (OUTPATIENT)
Dept: NEUROLOGY | Facility: CLINIC | Age: 85
End: 2020-02-19

## 2020-02-19 ENCOUNTER — APPOINTMENT (OUTPATIENT)
Dept: LAB | Facility: CLINIC | Age: 85
End: 2020-02-19
Payer: MEDICARE

## 2020-02-19 DIAGNOSIS — R56.9 SEIZURES (HCC): ICD-10-CM

## 2020-02-19 DIAGNOSIS — R56.9 SEIZURES (HCC): Primary | ICD-10-CM

## 2020-02-19 LAB — VALPROATE SERPL-MCNC: 37 UG/ML (ref 50–100)

## 2020-02-19 PROCEDURE — 36415 COLL VENOUS BLD VENIPUNCTURE: CPT

## 2020-02-19 PROCEDURE — 80164 ASSAY DIPROPYLACETIC ACD TOT: CPT

## 2020-02-19 PROCEDURE — 80177 DRUG SCRN QUAN LEVETIRACETAM: CPT

## 2020-02-19 NOTE — TELEPHONE ENCOUNTER
Patient's daughter calling to say that patient has rescheduled her appointment, but it is not until May 27  Would like for patient to check Keppra and valproic acid levels to make sure her med are ok  Patient daughter requesting a callback  Please put in order if agreeable  [phone]: Oj Riley to leave a detailed message

## 2020-02-20 ENCOUNTER — TELEPHONE (OUTPATIENT)
Dept: NEUROLOGY | Facility: CLINIC | Age: 85
End: 2020-02-20

## 2020-02-20 NOTE — TELEPHONE ENCOUNTER
----- Message from Rhonda Angeles MD sent at 2/20/2020  8:45 AM EST -----  Please call the patient regarding her abnormal result    Please have the patient follow up with family physician regarding increased sed rate

## 2020-02-21 ENCOUNTER — OFFICE VISIT (OUTPATIENT)
Dept: NEUROLOGY | Facility: CLINIC | Age: 85
End: 2020-02-21
Payer: MEDICARE

## 2020-02-21 VITALS
SYSTOLIC BLOOD PRESSURE: 132 MMHG | BODY MASS INDEX: 27.26 KG/M2 | DIASTOLIC BLOOD PRESSURE: 88 MMHG | HEIGHT: 65 IN | WEIGHT: 163.6 LBS | HEART RATE: 55 BPM

## 2020-02-21 DIAGNOSIS — G30.9 ALZHEIMER'S DEMENTIA WITHOUT BEHAVIORAL DISTURBANCE, UNSPECIFIED TIMING OF DEMENTIA ONSET (HCC): Primary | ICD-10-CM

## 2020-02-21 DIAGNOSIS — F02.80 ALZHEIMER'S DEMENTIA WITHOUT BEHAVIORAL DISTURBANCE, UNSPECIFIED TIMING OF DEMENTIA ONSET (HCC): Primary | ICD-10-CM

## 2020-02-21 DIAGNOSIS — Z86.73 HISTORY OF CVA (CEREBROVASCULAR ACCIDENT): ICD-10-CM

## 2020-02-21 DIAGNOSIS — G40.209 PARTIAL SYMPTOMATIC EPILEPSY WITH COMPLEX PARTIAL SEIZURES, NOT INTRACTABLE, WITHOUT STATUS EPILEPTICUS (HCC): ICD-10-CM

## 2020-02-21 LAB — LEVETIRACETAM SERPL-MCNC: 40.2 UG/ML (ref 10–40)

## 2020-02-21 PROCEDURE — 1160F RVW MEDS BY RX/DR IN RCRD: CPT | Performed by: PSYCHIATRY & NEUROLOGY

## 2020-02-21 PROCEDURE — 3008F BODY MASS INDEX DOCD: CPT | Performed by: PSYCHIATRY & NEUROLOGY

## 2020-02-21 PROCEDURE — 3075F SYST BP GE 130 - 139MM HG: CPT | Performed by: PSYCHIATRY & NEUROLOGY

## 2020-02-21 PROCEDURE — 3079F DIAST BP 80-89 MM HG: CPT | Performed by: PSYCHIATRY & NEUROLOGY

## 2020-02-21 PROCEDURE — 1036F TOBACCO NON-USER: CPT | Performed by: PSYCHIATRY & NEUROLOGY

## 2020-02-21 PROCEDURE — 99214 OFFICE O/P EST MOD 30 MIN: CPT | Performed by: PSYCHIATRY & NEUROLOGY

## 2020-02-21 NOTE — PROGRESS NOTES
Chris Go is a 81 y o  female returns in follow-up today with history of CVA, dementia and epilepsy    Assessment:  1  Alzheimer's dementia without behavioral disturbance, unspecified timing of dementia onset ([facility] [address] )    2  Partial symptomatic epilepsy with complex partial seizures, not intractable, without status epilepticus ([facility] [address] )    3  History of CVA (cerebrovascular accident)        Plan:  Continue current Keppra and Depakote doses  Continue Aricept and Namenda  Continue aspirin  Follow-up 3 months    Discussion:  Gideon Essex has been relatively stable from the standpoint of her epilepsy and that her family has not noticed any seizures  She seems to be tolerating the current dosages of Keppra and Depakote  The Depakote level was on the low side but as her seizures are stable will continue to monitor this  She was recently diagnosed with another urinary tract infection  Her daughter reports that she seems to be very lethargic all the time and gets wiped out easily  Cognitively she remains fairly stable and has no new some findings consistent with a new stroke  She will continue with current management and I will see her back in a few months      Subjective:    HPI  Gideon Essex returns in follow-up today accompanied by her daughter and caregiver  Her daughter reports that she seems to be lethargic all the time  She does ambulate as well as she used to  Her caretaker reports that she gets very fatigued very easily, even taking a shower seems to wipe her out  She remains on Aricept and Namenda and tolerates this well  She was recently diagnosed with urinary tract infection and is again on antibiotics  She has not had any seizures in the last month or 2 on Keppra a 1000 mg twice daily and Depakote  mg at bedtime  Her Depakote level was tested earlier this week was on the low side at 37  There is a Keppra level pending        Past Medical History:   Diagnosis Date    Acute renal failure (ARF) ([facility] [address] ) 10/3/2019    Dementia ([facility] [address] )     17 years ago     Epilepsy ([facility] [address] )     Essential hypertension 5/17/2019    Hypercholesterolemia     Stroke [facility])     UTI (urinary tract infection) 10/29/2019       Family History:  Family History   Problem Relation Age of Onset    Cirrhosis Mother     Alcohol abuse Mother     Cancer Father     No Known Problems Daughter     No Known Problems Brother        Past Surgical History:  Past Surgical History:   Procedure Laterality Date    CATARACT EXTRACTION, BILATERAL      3-4 years ago 8/2/2019    HYSTERECTOMY      KNEE SURGERY         Social History:   reports that she has never smoked  She has never used smokeless tobacco  She reports that she drank alcohol  She reports that she does not use drugs  Allergies:  Patient has no known allergies        Current Outpatient Medications:     aspirin 81 MG tablet, Take 1 tablet (81 mg total) by mouth daily, Disp: 30 tablet, Rfl: 5    B Complex-C (B-COMPLEX WITH VITAMIN C) tablet, Take 1 tablet by mouth daily, Disp: , Rfl:     Calcium-Magnesium-Zinc 333-133-5 MG TABS, Take 1 tablet by mouth daily, Disp: , Rfl:     Cholecalciferol (VITAMIN D3) 5000 units CAPS, Take by mouth daily, Disp: , Rfl:     divalproex sodium (DEPAKOTE ER) 500 mg 24 hr tablet, Take 1 tablet (500 mg total) by mouth daily At HS, Disp: 90 tablet, Rfl: 3    docusate sodium (COLACE) 100 mg capsule, Take 1 capsule (100 mg total) by mouth every 12 (twelve) hours for 14 days, Disp: 28 capsule, Rfl: 0    donepezil (ARICEPT) 10 mg tablet, TAKE 1 TABLET BY MOUTH  DAILY AT BEDTIME, Disp: 90 tablet, Rfl: 2    gabapentin (NEURONTIN) 400 mg capsule, TAKE 1 CAPSULE BY MOUTH TWO TIMES DAILY, Disp: 180 capsule, Rfl: 2    levETIRAcetam (KEPPRA) 500 mg tablet, TAKE 2 TABLETS BY MOUTH TWO TIMES DAILY, Disp: 360 tablet, Rfl: 5    lovastatin (MEVACOR) 20 mg tablet, Take 1 tablet (20 mg total) by mouth daily at bedtime, Disp: 90 tablet, Rfl: 3    memantine (NAMENDA) 10 mg tablet, Take 1 tablet (10 mg total) by mouth 2 (two) times a day, Disp: 180 tablet, Rfl: 3    metoprolol tartrate (LOPRESSOR) 25 mg tablet, TAKE 1 TABLET BY MOUTH  EVERY 12 HOURS, Disp: 180 tablet, Rfl: 1    multivitamin (THERAGRAN) TABS, Take 1 tablet by mouth daily, Disp: , Rfl:     nitrofurantoin (MACRODANTIN) 50 mg capsule, Take 1 capsule (50 mg total) by mouth daily, Disp: 90 capsule, Rfl: 3    levETIRAcetam (KEPPRA) 750 mg tablet, Two PO b i d  (Patient taking differently: Total of 2500 mg daily), Disp: 120 tablet, Rfl: 5    polyethylene glycol (MIRALAX) 17 g packet, Take 17 g by mouth daily, Disp: 14 each, Rfl: 0    I have reviewed the past medical, social and family history, current medications, allergies, vitals, review of systems and updated this information as appropriate today     Objective:    Vitals:  Blood pressure 132/88, pulse 55, height 5' 5" (1 651 m), weight 74 2 kg (163 lb 9 6 oz)  Physical Exam    Neurological Exam  GENERAL:  Well-developed well-nourished woman in no acute distress  HEENT/NECK: Head is atraumatic normocephalic, neck is supple  CARDIOVASCULAR:  No Carotid bruit  NEUROLOGIC:  Mental Status:  Patient is lethargic  Will answer simple questions such as are you feeling well or are you in pain  She tends to sit with her eyes closed and less addressed  Cranial Nerves: Extraocular movements are full  Face demonstrates a central right weakness  Motor:  A right upper extremity drift is noted on arm extension  Coordination:  Finger-to-nose testing is performed accurate  Presently in a wheelchair            ROS:    Review of Systems   Unable to perform ROS: Dementia (answers given by caretaker and daughter  )   Constitutional: Positive for activity change (lack of energy)  Negative for appetite change and fever  HENT: Negative  Negative for hearing loss, tinnitus, trouble swallowing and voice change  Eyes: Positive for itching  Negative for photophobia and pain     Respiratory: Positive for shortness of breath  Cardiovascular: Negative  Negative for palpitations  Gastrointestinal: Negative  Negative for nausea and vomiting  Endocrine: Positive for cold intolerance  Negative for heat intolerance  Genitourinary: Negative  Negative for dysuria, frequency and urgency  Musculoskeletal: Positive for gait problem  Negative for myalgias and neck pain  Skin: Negative  Negative for rash ("lumps" on the left temple)  Neurological: Positive for dizziness and light-headedness  Negative for tremors, seizures, syncope, facial asymmetry, speech difficulty, weakness, numbness and headaches  Hematological: Negative  Does not bruise/bleed easily  Psychiatric/Behavioral: Positive for agitation (loses her temper often) and confusion  Negative for hallucinations and sleep disturbance  The patient is nervous/anxious (repeats "she's scared")

## 2020-02-24 NOTE — TELEPHONE ENCOUNTER
Called daughter Lety Covarrubias regarding the sedimentation rate  Patient has already followed with PCP for UTI  Advised to follow up to make sure there is no other concerns regarding this  Provided f/u regarding keppra and depakote levels, however patient OV with dr Chandrika Israel has already covered this  Patient daughter verbalized understanding

## 2020-02-25 ENCOUNTER — TELEPHONE (OUTPATIENT)
Dept: UROLOGY | Facility: CLINIC | Age: 85
End: 2020-02-25

## 2020-02-25 DIAGNOSIS — N39.0 RECURRENT UTI: Primary | ICD-10-CM

## 2020-02-25 NOTE — TELEPHONE ENCOUNTER
----- Message from Juan Francisco Simpson PA-C sent at 2/25/2020  8:29 AM EST -----  Regarding: FW: Test Results Question  Contact: [phone]  Worsening dementia could be worsening dementia or UTI  Is she on the prophylactic nitrofurantoin? Could consider changing to keflex  What makes her doctor think she has "some type of infection"? We can certainly get another set of urine testing     ----- Message -----  From: Ibrahima Dean RN  Sent: 2/25/2020   7:18 AM EST  To: Juan Francisco Simpson PA-C  Subject: FW: Test Results Question                        Should she go for repeat urine testing to confirm that infection is gone?      ----- Message -----  From: Car Dennis  Sent: 2/24/2020   7:04 PM EST  To: [facility]  Subject: Test Results Question                            Carson Fletcher,    My mom tested positive for another UTI, as you can see from her test results  We had some blood work done this past Wednesday, Feb 19 which was ordered by Dr Chin Charles, her neurologist    Dr Beach Man office called me today to let me know that the test results were showing some type of infection in her system  I did let them know that a week before, she tested positive for another UTI  I am concerned that perhaps mom still has the infection? would that be possible?       Thank you ,  Demario Robledo

## 2020-02-25 NOTE — TELEPHONE ENCOUNTER
Called and spoke to patients daughter Mai Miquel Lafleur reports that patient is taking the daily nitrofurantoin  They discontinued this when she was taking the keflex and restarted when that we finished  Mai Lafleur denies patient having any symptoms of UTI  She does seem tired and Mai Lafluer reports that this has become consistent now  Mai Lafleur reports that patient had blood work done last Wednesday and they are concerned about her having a possible infection somewhere because her Furmentation level was high  Patient was finished with keflex antibiotics at the time this was done  Made tariq aware that we can send patient for repeat urine testing as she requested to confirm that she does not have a possible UTI  Urine testing was ordered  Mai Lafleur will  urine cup today and collect sample and take to [facility] tomorrow

## 2020-02-26 ENCOUNTER — APPOINTMENT (OUTPATIENT)
Dept: LAB | Facility: HOSPITAL | Age: 85
End: 2020-02-26
Payer: MEDICARE

## 2020-02-26 ENCOUNTER — TELEPHONE (OUTPATIENT)
Dept: UROLOGY | Facility: CLINIC | Age: 85
End: 2020-02-26

## 2020-02-26 DIAGNOSIS — N39.0 RECURRENT UTI: Primary | ICD-10-CM

## 2020-02-26 DIAGNOSIS — N39.0 RECURRENT UTI: ICD-10-CM

## 2020-02-26 LAB
BACTERIA UR QL AUTO: ABNORMAL /HPF
BILIRUB UR QL STRIP: NEGATIVE
CLARITY UR: ABNORMAL
COLOR UR: YELLOW
GLUCOSE UR STRIP-MCNC: NEGATIVE MG/DL
HGB UR QL STRIP.AUTO: NEGATIVE
KETONES UR STRIP-MCNC: NEGATIVE MG/DL
LEUKOCYTE ESTERASE UR QL STRIP: ABNORMAL
NITRITE UR QL STRIP: POSITIVE
NON-SQ EPI CELLS URNS QL MICRO: ABNORMAL /HPF
PH UR STRIP.AUTO: 6 [PH]
PROT UR STRIP-MCNC: ABNORMAL MG/DL
RBC #/AREA URNS AUTO: ABNORMAL /HPF
SP GR UR STRIP.AUTO: 1.01 (ref 1–1.03)
UROBILINOGEN UR QL STRIP.AUTO: 0.2 E.U./DL
WBC #/AREA URNS AUTO: ABNORMAL /HPF

## 2020-02-26 PROCEDURE — 87077 CULTURE AEROBIC IDENTIFY: CPT

## 2020-02-26 PROCEDURE — 87186 SC STD MICRODIL/AGAR DIL: CPT

## 2020-02-26 PROCEDURE — 87086 URINE CULTURE/COLONY COUNT: CPT

## 2020-02-26 PROCEDURE — 81001 URINALYSIS AUTO W/SCOPE: CPT

## 2020-02-26 RX ORDER — CIPROFLOXACIN 500 MG/1
500 TABLET, FILM COATED ORAL EVERY 12 HOURS SCHEDULED
Qty: 14 TABLET | Refills: 0 | Status: SHIPPED | OUTPATIENT
Start: 2020-02-26 | End: 2020-03-04

## 2020-02-26 NOTE — TELEPHONE ENCOUNTER
Spoke to her daughter and caregiver Ibeth Bee to tell her per Laurie Magaña PA-C that cipro has been called into her [facility]  I explained that the antibiotic may need to change depending on what the final urine culture results reveal  She gave verbal understanding of this and we also discussed proper hydration  We will watch for final urine culture results

## 2020-02-26 NOTE — TELEPHONE ENCOUNTER
----- Message from Amadou Levi PA-C sent at 2/26/2020  3:25 PM EST -----  Urine continues to be nitrite positive  I have sent a 7 day prescription of ciprofloxacin to the patient's preferred pharmacy  I will continue to monitor for culture and sensitivities

## 2020-02-28 ENCOUNTER — TELEPHONE (OUTPATIENT)
Dept: UROLOGY | Facility: CLINIC | Age: 85
End: 2020-02-28

## 2020-02-28 DIAGNOSIS — N39.0 RECURRENT UTI: ICD-10-CM

## 2020-02-28 LAB — BACTERIA UR CULT: ABNORMAL

## 2020-02-28 RX ORDER — NITROFURANTOIN MACROCRYSTALS 50 MG/1
50 CAPSULE ORAL DAILY
Qty: 90 CAPSULE | Refills: 0 | OUTPATIENT
Start: 2020-02-28

## 2020-02-28 NOTE — TELEPHONE ENCOUNTER
Called and spoke with patients daughter Paul Mclain  Informed Paul Mclain that patient will need an appointment for the office to fill her maintenance antibiotic  Patient schedule 03/20/20 at 11:30am with Rhianna Watkins in [facility]

## 2020-02-28 NOTE — TELEPHONE ENCOUNTER
Made patient aware     ----- Message from Delaney Hamilton PA-C sent at 2/28/2020 12:04 PM EST -----  Ciprofloxacin is an appropriate antibiotic  She should complete this antibiotic as prescribed

## 2020-03-13 ENCOUNTER — TELEPHONE (OUTPATIENT)
Dept: UROLOGY | Facility: CLINIC | Age: 85
End: 2020-03-13

## 2020-03-13 DIAGNOSIS — N39.0 URINARY TRACT INFECTION WITHOUT HEMATURIA, SITE UNSPECIFIED: Primary | ICD-10-CM

## 2020-03-13 NOTE — TELEPHONE ENCOUNTER
Called and spoke to [place]  Explained to her that we have placed orders for urine testing and should get the specimen done before her appointment on Friday  Sj is going to be picking up sterile containers in the [facility] office  Sj had no further questions

## 2020-03-13 NOTE — TELEPHONE ENCOUNTER
Daughter Nilay Pelaez called to see if you wanted a urine sample prior to her appointment next Friday 3/20/2020 in [facility] with Miller Garcia PA-C  They usually come to the office and get a specimen cup and take it right to the [place] lab  She is unsure if symptoms due to [facility] being with dementia, however she stated her mental status is her usual self right now, just very tired  Do you feel they should do the urine prior to appointment? Please advise

## 2020-03-13 NOTE — TELEPHONE ENCOUNTER
----- Message from Yosi Gonzáles sent at 3/13/2020  9:27 AM EDT -----  Regarding: Visit Follow-Up Question  Contact: [phone]  My mom has an appointment on Friday, March 20th  She has had reoccurring UTI's  Was wondering if it would be prudent to collect a urine sample before her visit? So, we can be reassured that the infection has cleared up  She is still showing signs of fatigue, which concerns me  Please advise        Thank you  Keira Links

## 2020-03-17 ENCOUNTER — APPOINTMENT (OUTPATIENT)
Dept: LAB | Facility: HOSPITAL | Age: 85
End: 2020-03-17
Payer: MEDICARE

## 2020-03-17 DIAGNOSIS — N39.0 URINARY TRACT INFECTION WITHOUT HEMATURIA, SITE UNSPECIFIED: ICD-10-CM

## 2020-03-17 LAB
BACTERIA UR QL AUTO: ABNORMAL /HPF
BILIRUB UR QL STRIP: NEGATIVE
CLARITY UR: CLEAR
COLOR UR: YELLOW
GLUCOSE UR STRIP-MCNC: NEGATIVE MG/DL
HGB UR QL STRIP.AUTO: NEGATIVE
KETONES UR STRIP-MCNC: NEGATIVE MG/DL
LEUKOCYTE ESTERASE UR QL STRIP: ABNORMAL
NITRITE UR QL STRIP: NEGATIVE
NON-SQ EPI CELLS URNS QL MICRO: ABNORMAL /HPF
PH UR STRIP.AUTO: 7 [PH]
PROT UR STRIP-MCNC: NEGATIVE MG/DL
RBC #/AREA URNS AUTO: ABNORMAL /HPF
SP GR UR STRIP.AUTO: 1.01 (ref 1–1.03)
UROBILINOGEN UR QL STRIP.AUTO: 0.2 E.U./DL
WBC #/AREA URNS AUTO: ABNORMAL /HPF

## 2020-03-17 PROCEDURE — 87186 SC STD MICRODIL/AGAR DIL: CPT

## 2020-03-17 PROCEDURE — 87077 CULTURE AEROBIC IDENTIFY: CPT

## 2020-03-17 PROCEDURE — 87086 URINE CULTURE/COLONY COUNT: CPT

## 2020-03-17 PROCEDURE — 81001 URINALYSIS AUTO W/SCOPE: CPT

## 2020-03-19 ENCOUNTER — TELEPHONE (OUTPATIENT)
Dept: UROLOGY | Facility: CLINIC | Age: 85
End: 2020-03-19

## 2020-03-19 ENCOUNTER — TELEMEDICINE (OUTPATIENT)
Dept: UROLOGY | Facility: CLINIC | Age: 85
End: 2020-03-19
Payer: MEDICARE

## 2020-03-19 DIAGNOSIS — N39.0 RECURRENT UTI: Primary | ICD-10-CM

## 2020-03-19 PROCEDURE — G2012 BRIEF CHECK IN BY MD/QHP: HCPCS | Performed by: PHYSICIAN ASSISTANT

## 2020-03-19 RX ORDER — METHENAMINE HIPPURATE 1000 MG/1
1 TABLET ORAL 2 TIMES DAILY WITH MEALS
Qty: 180 TABLET | Refills: 3 | Status: SHIPPED | OUTPATIENT
Start: 2020-03-19 | End: 2020-03-23

## 2020-03-19 NOTE — PROGRESS NOTES
Virtual Regular Visit    Reason for visit is question of recurrent urinary tract infections versus colonization  This e-visit was done via telephone with patient's daughter, Sayda Tellez, as patient is nonverbal     Encounter provider Shania Joyner PA-C    Provider located at [address] [place] [address] [address] [facility] [address] [place] [address]      Recent Visits  Date Type Provider Dept   03/13/20 Telephone Sundra Clamp, [address] [facility] Kathee Matters recent visits within past 7 days and meeting all other requirements     Today's Visits  Date Type Provider Dept   03/19/20 Telephone Shania Joyner PA-C [facility] Kathee Matters today's visits and meeting all other requirements     Future Appointments  No visits were found meeting these conditions  Showing future appointments within next 150 days and meeting all other requirements        After connecting through telephone, the patient was identified by name and date of birth  Sohail Peña was informed that this is a telemedicine visit and that the exam is being conducted confidentially over secure lines  My office door was closed  No one else was in the room  She acknowledged consent and understanding of privacy and security of the video platform  The patient has agreed to participate and understands they can discontinue the visit at any time  Subjective  Sohail Peña is a 81 y o  female patient recently seen for recurrent urinary tract infections in the setting of vascular dementia  Patient's daughter had reported that her primary symptom was increase in fatigue  Urine cultures have been consistently positive for E coli  Patient was started on prophylactic nitrofurantoin at her visit 3 months ago  She has also been treated for urinary tract infections in the interim  Her daughter reports no change in patient's behavior prior to positive cultures or with antibiotic treatment  Patient has also been being followed by neurology and was recently seen or was determined that her medications were working well and the fatigue was discussed that this may be part of the physiologic course of her disease  Urine specimen was recently collected which was nitrite positive  We discussed that the culture may return positive but it is likely due to colonization and not in acute infection  Past Medical History:   Diagnosis Date    [facility]F) [facility] [address] ) 10/3/2019    Dementia ([facility] [address] )     17 years ago     Epilepsy ([facility] )     Essential hypertension 5/17/2019    Hypercholesterolemia     Stroke [facility])     UTI (urinary tract infection) 10/29/2019       Past Surgical History:   Procedure Laterality Date    CATARACT EXTRACTION, BILATERAL      3-4 years ago 8/2/2019    HYSTERECTOMY      KNEE SURGERY         Current Outpatient Medications   Medication Sig Dispense Refill    aspirin 81 MG tablet Take 1 tablet (81 mg total) by mouth daily 30 tablet 5    B Complex-C (B-COMPLEX WITH VITAMIN C) tablet Take 1 tablet by mouth daily      Calcium-Magnesium-Zinc 333-133-5 MG TABS Take 1 tablet by mouth daily      Cholecalciferol (VITAMIN D3) 5000 units CAPS Take by mouth daily      divalproex sodium (DEPAKOTE ER) 500 mg 24 hr tablet Take 1 tablet (500 mg total) by mouth daily At HS 90 tablet 3    docusate sodium (COLACE) 100 mg capsule Take 1 capsule (100 mg total) by mouth every 12 (twelve) hours for 14 days 28 capsule 0    donepezil (ARICEPT) 10 mg tablet TAKE 1 TABLET BY MOUTH  DAILY AT BEDTIME 90 tablet 2    gabapentin (NEURONTIN) 400 mg capsule TAKE 1 CAPSULE BY MOUTH TWO TIMES DAILY 180 capsule 2    levETIRAcetam (KEPPRA) 500 mg tablet TAKE 2 TABLETS BY MOUTH TWO TIMES DAILY 360 tablet 5    levETIRAcetam (KEPPRA) 750 mg tablet Two PO b i d  (Patient taking differently:  Total of 2500 mg daily) 120 tablet 5    lovastatin (MEVACOR) 20 mg tablet Take 1 tablet (20 mg total) by mouth daily at bedtime 90 tablet 3    memantine (NAMENDA) 10 mg tablet Take 1 tablet (10 mg total) by mouth 2 (two) times a day 180 tablet 3    metoprolol tartrate (LOPRESSOR) 25 mg tablet TAKE 1 TABLET BY MOUTH  EVERY 12 HOURS 180 tablet 1    multivitamin (THERAGRAN) TABS Take 1 tablet by mouth daily      nitrofurantoin (MACRODANTIN) 50 mg capsule Take 1 capsule (50 mg total) by mouth daily 90 capsule 3    polyethylene glycol (MIRALAX) 17 g packet Take 17 g by mouth daily 14 each 0     No current facility-administered medications for this visit  No Known Allergies    Plan:  We discussed continuing to avoid urine testing if significant symptoms are not present  We will try methenamine hippurate and vitamin-C and discontinue nitrofurantoin at this time  Patient will plan to follow up in 4-5 months for symptom reassessment  She was encouraged to contact our office with any concerns in the interim  I spent 15 minutes with the patient during this visit

## 2020-03-19 NOTE — TELEPHONE ENCOUNTER
I left a message for the patient's daughter to review her mother's course over the phone due to concerns of COVID-19  I left our phone number for them to call back to discuss further

## 2020-03-22 LAB
BACTERIA UR CULT: ABNORMAL
BACTERIA UR CULT: ABNORMAL

## 2020-03-23 ENCOUNTER — TELEPHONE (OUTPATIENT)
Dept: UROLOGY | Facility: CLINIC | Age: 85
End: 2020-03-23

## 2020-03-23 DIAGNOSIS — N39.0 RECURRENT UTI: Primary | ICD-10-CM

## 2020-03-23 RX ORDER — NITROFURANTOIN 25; 75 MG/1; MG/1
100 CAPSULE ORAL DAILY
Qty: 90 CAPSULE | Refills: 3 | Status: SHIPPED | OUTPATIENT
Start: 2020-03-23

## 2020-03-23 NOTE — TELEPHONE ENCOUNTER
Called and spoke to patients daughter to advise script for the nitrofurantoin was sent to the preferred [facility] pharmacy  Patients daughter states she understands

## 2020-03-23 NOTE — TELEPHONE ENCOUNTER
Medication was removed from the list when Hiprex was added  I have renewed the nitrofurantoin, 100 mg daily and sent a 90 day supply with 3 refills to their preferred [facility] pharmacy

## 2020-03-23 NOTE — TELEPHONE ENCOUNTER
Please review and advise  Thank you!      ----- Message from Kasey Bonner sent at 3/20/2020  6:59 PM EDT -----  Regarding: RE: FW: Prescription Question  Contact: [phone]  I do not see that prescription on my mom's list of meds on her chart  She only has 5 doses left   ----- Message -----  From: Rikki Schwarz PA-C  Sent: 3/20/2020  2:28 PM EDT  To: Kasey Bonner  Subject: RE: FW: Prescription Question  That is unfortunate  I would recommend continuing then with a prophylactic antibiotic that she has been on  I would recommend only testing her urine if she is having fevers or blood in the urine  Laurie Magaña PA-C    ----- Message -----  From: Kasey Bonner  Sent: 3/20/2020   1:55 PM EDT  To: [facility]  Subject: Prescription Question                            Dennycruzito Boggs, the new prescription for mom seems to costs over $250 00 for a 3 month supply  Is there another type of medication that is more reasonable, or is there a generic of this medication? We did not  the prescription, as it is alot of money       Thank you  Alma Shelton

## 2020-03-27 ENCOUNTER — TELEMEDICINE (OUTPATIENT)
Dept: FAMILY MEDICINE CLINIC | Facility: CLINIC | Age: 85
End: 2020-03-27
Payer: MEDICARE

## 2020-03-27 DIAGNOSIS — R26.9 NEUROLOGIC GAIT DYSFUNCTION: ICD-10-CM

## 2020-03-27 DIAGNOSIS — G40.209 PARTIAL SYMPTOMATIC EPILEPSY WITH COMPLEX PARTIAL SEIZURES, NOT INTRACTABLE, WITHOUT STATUS EPILEPTICUS (HCC): ICD-10-CM

## 2020-03-27 DIAGNOSIS — E78.5 HYPERLIPIDEMIA, UNSPECIFIED HYPERLIPIDEMIA TYPE: ICD-10-CM

## 2020-03-27 DIAGNOSIS — N39.0 RECURRENT UTI: ICD-10-CM

## 2020-03-27 DIAGNOSIS — I10 ESSENTIAL HYPERTENSION: ICD-10-CM

## 2020-03-27 DIAGNOSIS — F03.90 DEMENTIA WITHOUT BEHAVIORAL DISTURBANCE, UNSPECIFIED DEMENTIA TYPE (HCC): Primary | ICD-10-CM

## 2020-03-27 PROBLEM — G40.909 SEIZURE DISORDER (HCC): Status: RESOLVED | Noted: 2019-05-17 | Resolved: 2020-03-27

## 2020-03-27 PROCEDURE — 99214 OFFICE O/P EST MOD 30 MIN: CPT | Performed by: FAMILY MEDICINE

## 2020-03-27 NOTE — PROGRESS NOTES
Virtual Regular Visit  Return visit in 4 months  Patient is essentially bed-bound at home  She will require electronic hospital bed to keep head of bed at 30° to prevent aspiration and for frequent body position changes  Length of need for hospital bed is indefinite  Problem List Items Addressed This Visit        Cardiovascular and Mediastinum    Essential hypertension     Continue Lopressor 25 mg b i d  Nervous and Auditory    Dementia without behavioral disturbance (HCC) - Primary     Continue Aricept and Namenda         Partial symptomatic epilepsy with complex partial seizures, not intractable, without status epilepticus (HCC)     Continue Depakote 500 mg daily and Keppra 750 mg 2 b i d  Genitourinary    Recurrent UTI     Continue Macrobid 100 mg daily            Other    Hyperlipidemia     Continue lovastatin 20 mg daily         Neurologic gait dysfunction               Reason for visit is checkup    Encounter provider Clair Pinedo MD    Provider located at [facility] [address] [address]  [phone]      Recent Visits  No visits were found meeting these conditions  Showing recent visits within past 7 days and meeting all other requirements     Future Appointments  No visits were found meeting these conditions  Showing future appointments within next 150 days and meeting all other requirements        After connecting through [employer], the patient was identified by name and date of birth  Justine De Jesus was informed that this is a telemedicine visit and that the visit is being conducted through [employer] and patient was informed that this is not a secure, HIPAA-complaint platform  she agrees to proceed  which may not be secure and therefore, might not be HIPAA-compliant  My office door was closed  No one else was in the room    She acknowledged consent and understanding of privacy and security of the video platform  The patient has agreed to participate and understands they can discontinue the visit at any time  Subjective  Veronique Wilcox is a 81 y o  female patient is due for regular checkup  She is seen with her daughter who is her caregiver  She has moderately severe dementia  Her daughter notes her to have recurrent UTIs and she has been started on Macrobid as a preventative  Daughter also notes that she is having increased inability to ambulate  She is seen in a wheelchair with a flat affect         Past Medical History:   Diagnosis Date    [facility] failure ([facility]F) [facility] [address] ) 10/3/2019    Dementia ([facility] [address] )     17 years ago     Epilepsy ([facility] [address] )     Essential hypertension 5/17/2019    Hypercholesterolemia     Stroke [facility])     UTI (urinary tract infection) 10/29/2019       Past Surgical History:   Procedure Laterality Date    CATARACT EXTRACTION, BILATERAL      3-4 years ago 8/2/2019    HYSTERECTOMY      KNEE SURGERY         Current Outpatient Medications   Medication Sig Dispense Refill    aspirin 81 MG tablet Take 1 tablet (81 mg total) by mouth daily 30 tablet 5    B Complex-C (B-COMPLEX WITH VITAMIN C) tablet Take 1 tablet by mouth daily      Calcium-Magnesium-Zinc 333-133-5 MG TABS Take 1 tablet by mouth daily      Cholecalciferol (VITAMIN D3) 5000 units CAPS Take by mouth daily      divalproex sodium (DEPAKOTE ER) 500 mg 24 hr tablet Take 1 tablet (500 mg total) by mouth daily At HS 90 tablet 3    docusate sodium (COLACE) 100 mg capsule Take 1 capsule (100 mg total) by mouth every 12 (twelve) hours for 14 days 28 capsule 0    donepezil (ARICEPT) 10 mg tablet TAKE 1 TABLET BY MOUTH  DAILY AT BEDTIME 90 tablet 2    gabapentin (NEURONTIN) 400 mg capsule TAKE 1 CAPSULE BY MOUTH TWO TIMES DAILY 180 capsule 2    levETIRAcetam (KEPPRA) 500 mg tablet TAKE 2 TABLETS BY MOUTH TWO TIMES DAILY 360 tablet 5    lovastatin (MEVACOR) 20 mg tablet TAKE 1 TABLET BY MOUTH  DAILY AT BEDTIME 90 tablet 3    memantine (NAMENDA) 10 mg tablet TAKE 1 TABLET BY MOUTH TWO  TIMES DAILY 180 tablet 3    metoprolol tartrate (LOPRESSOR) 25 mg tablet Take 1 tablet (25 mg total) by mouth every 12 (twelve) hours 180 tablet 1    multivitamin (THERAGRAN) TABS Take 1 tablet by mouth daily      nitrofurantoin (MACROBID) 100 mg capsule Take 1 capsule (100 mg total) by mouth daily 90 capsule 3    nitrofurantoin (MACRODANTIN) 50 mg capsule Take 50 mg by mouth daily       No current facility-administered medications for this visit  No Known Allergies    Review of Systems   Constitutional: Positive for fatigue  Negative for fever  HENT: Negative  Respiratory: Negative  Cardiovascular: Negative  Musculoskeletal: Positive for gait problem  Psychiatric/Behavioral: Positive for confusion  Physical Exam     I spent 25 minutes with the patient during this visit    Level of service [id number]

## 2020-04-08 ENCOUNTER — TELEMEDICINE (OUTPATIENT)
Dept: GERIATRICS | Age: 85
End: 2020-04-08
Payer: MEDICARE

## 2020-04-08 DIAGNOSIS — G40.209 PARTIAL SYMPTOMATIC EPILEPSY WITH COMPLEX PARTIAL SEIZURES, NOT INTRACTABLE, WITHOUT STATUS EPILEPTICUS (HCC): ICD-10-CM

## 2020-04-08 DIAGNOSIS — G40.909 SEIZURE DISORDER (HCC): ICD-10-CM

## 2020-04-08 DIAGNOSIS — I10 ESSENTIAL HYPERTENSION: ICD-10-CM

## 2020-04-08 DIAGNOSIS — F03.90 DEMENTIA WITHOUT BEHAVIORAL DISTURBANCE, UNSPECIFIED DEMENTIA TYPE (HCC): Primary | ICD-10-CM

## 2020-04-08 DIAGNOSIS — E78.5 HYPERLIPIDEMIA, UNSPECIFIED HYPERLIPIDEMIA TYPE: ICD-10-CM

## 2020-04-08 DIAGNOSIS — N39.0 RECURRENT UTI: ICD-10-CM

## 2020-04-08 PROCEDURE — 3079F DIAST BP 80-89 MM HG: CPT | Performed by: FAMILY MEDICINE

## 2020-04-08 PROCEDURE — 99214 OFFICE O/P EST MOD 30 MIN: CPT | Performed by: FAMILY MEDICINE

## 2020-04-08 PROCEDURE — 1036F TOBACCO NON-USER: CPT | Performed by: FAMILY MEDICINE

## 2020-04-08 PROCEDURE — 1160F RVW MEDS BY RX/DR IN RCRD: CPT | Performed by: FAMILY MEDICINE

## 2020-04-08 PROCEDURE — 3075F SYST BP GE 130 - 139MM HG: CPT | Performed by: FAMILY MEDICINE

## 2020-04-08 RX ORDER — NITROFURANTOIN MACROCRYSTALS 50 MG/1
50 CAPSULE ORAL DAILY
COMMUNITY
Start: 2020-03-20

## 2020-05-06 DIAGNOSIS — E78.5 HYPERLIPIDEMIA, UNSPECIFIED HYPERLIPIDEMIA TYPE: ICD-10-CM

## 2020-05-07 RX ORDER — LOVASTATIN 20 MG/1
TABLET ORAL
Qty: 90 TABLET | Refills: 3 | Status: SHIPPED | OUTPATIENT
Start: 2020-05-07

## 2020-05-08 DIAGNOSIS — I10 ESSENTIAL HYPERTENSION: ICD-10-CM

## 2020-06-03 ENCOUNTER — OFFICE VISIT (OUTPATIENT)
Dept: NEUROLOGY | Facility: CLINIC | Age: 85
End: 2020-06-03
Payer: MEDICARE

## 2020-06-03 VITALS
BODY MASS INDEX: 26.82 KG/M2 | DIASTOLIC BLOOD PRESSURE: 70 MMHG | HEIGHT: 65 IN | WEIGHT: 161 LBS | HEART RATE: 54 BPM | SYSTOLIC BLOOD PRESSURE: 136 MMHG

## 2020-06-03 DIAGNOSIS — G40.209 PARTIAL SYMPTOMATIC EPILEPSY WITH COMPLEX PARTIAL SEIZURES, NOT INTRACTABLE, WITHOUT STATUS EPILEPTICUS (HCC): ICD-10-CM

## 2020-06-03 DIAGNOSIS — F02.80 ALZHEIMER'S DEMENTIA WITHOUT BEHAVIORAL DISTURBANCE, UNSPECIFIED TIMING OF DEMENTIA ONSET (HCC): Primary | ICD-10-CM

## 2020-06-03 DIAGNOSIS — G30.9 ALZHEIMER'S DEMENTIA WITHOUT BEHAVIORAL DISTURBANCE, UNSPECIFIED TIMING OF DEMENTIA ONSET (HCC): Primary | ICD-10-CM

## 2020-06-03 DIAGNOSIS — Z86.73 HISTORY OF CVA (CEREBROVASCULAR ACCIDENT): ICD-10-CM

## 2020-06-03 PROCEDURE — 99213 OFFICE O/P EST LOW 20 MIN: CPT | Performed by: PSYCHIATRY & NEUROLOGY

## 2020-06-03 PROCEDURE — 1036F TOBACCO NON-USER: CPT | Performed by: PSYCHIATRY & NEUROLOGY

## 2020-06-03 PROCEDURE — 3078F DIAST BP <80 MM HG: CPT | Performed by: PSYCHIATRY & NEUROLOGY

## 2020-06-03 PROCEDURE — 3008F BODY MASS INDEX DOCD: CPT | Performed by: PSYCHIATRY & NEUROLOGY

## 2020-06-03 PROCEDURE — 1160F RVW MEDS BY RX/DR IN RCRD: CPT | Performed by: PSYCHIATRY & NEUROLOGY

## 2020-06-03 PROCEDURE — 3075F SYST BP GE 130 - 139MM HG: CPT | Performed by: PSYCHIATRY & NEUROLOGY

## 2020-07-07 DIAGNOSIS — R26.9 NEUROLOGIC GAIT DYSFUNCTION: Primary | ICD-10-CM

## 2020-07-10 DIAGNOSIS — G30.9 ALZHEIMER'S DEMENTIA WITHOUT BEHAVIORAL DISTURBANCE, UNSPECIFIED TIMING OF DEMENTIA ONSET (HCC): ICD-10-CM

## 2020-07-10 DIAGNOSIS — F02.80 ALZHEIMER'S DEMENTIA WITHOUT BEHAVIORAL DISTURBANCE, UNSPECIFIED TIMING OF DEMENTIA ONSET (HCC): ICD-10-CM

## 2020-07-10 RX ORDER — MEMANTINE HYDROCHLORIDE 10 MG/1
TABLET ORAL
Qty: 180 TABLET | Refills: 3 | Status: SHIPPED | OUTPATIENT
Start: 2020-07-10

## 2020-07-22 NOTE — PROGRESS NOTES
Assessment and plan:       1  Recurrent urinary tract infection  - I have recommended she continue her prophylactic antibiotic  - She has been asymptomatic in regards to systemic symptoms or any specific symptoms of urinary tract infections since her last follow-up  - She will follow-up moving forward on an as-needed basis  Panfilo Lucero PA-C      Chief Complaint     Chief Complaint   Patient presents with    Urinary Tract Infection         History of Present Illness     Shantell King is a 81 y o  female patient previously seen on multiple occasions for recurrent urinary tract infections in the setting of vascular dementia  Patient's daughter reports that her primary symptom is an increase in fatigue with urine cultures consistently positive for E coli  She was previously started on prophylactic nitrofurantoin  Between her consultation and her most recent appointment she had had 4 positive urine cultures with no change in behavior prior to positive culture or after treatment  Patient has not had urine testing performed since 03/17/2020  Patient continues to have a decline in ambulation and an increase in fatigue  She is emptying well today with a PVR of 49 mL  Laboratory     Lab Results   Component Value Date    CREATININE 1 06 11/19/2019       No results found for: PSA    No results found for this or any previous visit (from the past 1 hour(s))  Review of Systems     Review of Systems   Unable to perform ROS: Patient nonverbal                 Allergies     No Known Allergies    Physical Exam     Physical Exam   Constitutional: She appears well-developed and well-nourished  No distress  HENT:   Head: Normocephalic and atraumatic  Right Ear: External ear normal    Left Ear: External ear normal    Nose: Nose normal    Eyes: Right eye exhibits no discharge  Left eye exhibits no discharge  No scleral icterus  Cardiovascular: Normal rate     Pulmonary/Chest: Effort normal    Musculoskeletal:   In wheelchair   Neurological: She is alert  Skin: Skin is warm and dry  She is not diaphoretic  Psychiatric:   Had her eyes closed appearing to be sleeping for majority of the visit  Nursing note and vitals reviewed          Vital Signs     Vitals:    07/24/20 1315   BP: 130/86   Pulse: (!) 49   Temp: 99 1 °F (37 3 °C)   Height: 5' 5" (1 651 m)         Current Medications       Current Outpatient Medications:     aspirin 81 MG tablet, Take 1 tablet (81 mg total) by mouth daily, Disp: 30 tablet, Rfl: 5    B Complex-C (B-COMPLEX WITH VITAMIN C) tablet, Take 1 tablet by mouth daily, Disp: , Rfl:     Calcium-Magnesium-Zinc 333-133-5 MG TABS, Take 1 tablet by mouth daily, Disp: , Rfl:     Cholecalciferol (VITAMIN D3) 5000 units CAPS, Take by mouth daily, Disp: , Rfl:     divalproex sodium (DEPAKOTE ER) 500 mg 24 hr tablet, Take 1 tablet (500 mg total) by mouth daily At HS, Disp: 90 tablet, Rfl: 3    donepezil (ARICEPT) 10 mg tablet, TAKE 1 TABLET BY MOUTH  DAILY AT BEDTIME, Disp: 90 tablet, Rfl: 2    gabapentin (NEURONTIN) 400 mg capsule, TAKE 1 CAPSULE BY MOUTH TWO TIMES DAILY, Disp: 180 capsule, Rfl: 2    levETIRAcetam (KEPPRA) 500 mg tablet, TAKE 2 TABLETS BY MOUTH TWO TIMES DAILY, Disp: 360 tablet, Rfl: 5    lovastatin (MEVACOR) 20 mg tablet, TAKE 1 TABLET BY MOUTH  DAILY AT BEDTIME, Disp: 90 tablet, Rfl: 3    memantine (NAMENDA) 10 mg tablet, TAKE 1 TABLET BY MOUTH TWO  TIMES DAILY, Disp: 180 tablet, Rfl: 3    metoprolol tartrate (LOPRESSOR) 25 mg tablet, Take 1 tablet (25 mg total) by mouth every 12 (twelve) hours, Disp: 180 tablet, Rfl: 1    multivitamin (THERAGRAN) TABS, Take 1 tablet by mouth daily, Disp: , Rfl:     nitrofurantoin (MACROBID) 100 mg capsule, Take 1 capsule (100 mg total) by mouth daily, Disp: 90 capsule, Rfl: 3    nitrofurantoin (MACRODANTIN) 50 mg capsule, Take 50 mg by mouth daily, Disp: , Rfl:     docusate sodium (COLACE) 100 mg capsule, Take 1 capsule (100 mg total) by mouth every 12 (twelve) hours for 14 days, Disp: 28 capsule, Rfl: 0      Active Problems     Patient Active Problem List   Diagnosis    Essential hypertension    Hyperlipidemia    Dementia without behavioral disturbance (HCC)    Partial symptomatic epilepsy with complex partial seizures, not intractable, without status epilepticus ([facility] [address] [facility]    Hepatic lesion    Cyst of ovary    Fatigue    Renal cyst    Goals of care, counseling/discussion    Neurologic gait dysfunction    Recurrent UTI         Past Medical History     Past Medical History:   Diagnosis Date    Acute renal failure (ARF) ([facility] [address] ) 10/3/2019    Dementia ([facility] [address] )     17 years ago     Epilepsy ([facility] [address] )     Essential hypertension 5/17/2019    Hypercholesterolemia     Stroke ([facility] [address] )     UTI (urinary tract infection) 10/29/2019         Surgical History     Past Surgical History:   Procedure Laterality Date    CATARACT EXTRACTION, BILATERAL      3-4 years ago 8/2/2019    HYSTERECTOMY      KNEE SURGERY           Family History     Family History   Problem Relation Age of Onset    Cirrhosis Mother     Alcohol abuse Mother     Cancer Father     No Known Problems Daughter     No Known Problems Brother          Social History     Social History       Radiology

## 2020-07-24 ENCOUNTER — OFFICE VISIT (OUTPATIENT)
Dept: UROLOGY | Facility: CLINIC | Age: 85
End: 2020-07-24
Payer: MEDICARE

## 2020-07-24 VITALS
DIASTOLIC BLOOD PRESSURE: 86 MMHG | BODY MASS INDEX: 26.79 KG/M2 | HEIGHT: 65 IN | SYSTOLIC BLOOD PRESSURE: 130 MMHG | HEART RATE: 49 BPM | TEMPERATURE: 99.1 F

## 2020-07-24 DIAGNOSIS — N39.0 RECURRENT UTI: Primary | ICD-10-CM

## 2020-07-24 LAB — POST-VOID RESIDUAL VOLUME, ML POC: 49 ML

## 2020-07-24 PROCEDURE — 1036F TOBACCO NON-USER: CPT | Performed by: PHYSICIAN ASSISTANT

## 2020-07-24 PROCEDURE — 3075F SYST BP GE 130 - 139MM HG: CPT | Performed by: PHYSICIAN ASSISTANT

## 2020-07-24 PROCEDURE — 99213 OFFICE O/P EST LOW 20 MIN: CPT | Performed by: PHYSICIAN ASSISTANT

## 2020-07-24 PROCEDURE — 51798 US URINE CAPACITY MEASURE: CPT | Performed by: PHYSICIAN ASSISTANT

## 2020-07-24 PROCEDURE — 1160F RVW MEDS BY RX/DR IN RCRD: CPT | Performed by: PHYSICIAN ASSISTANT

## 2020-07-24 PROCEDURE — 3079F DIAST BP 80-89 MM HG: CPT | Performed by: PHYSICIAN ASSISTANT

## 2020-08-31 ENCOUNTER — PATIENT MESSAGE (OUTPATIENT)
Dept: FAMILY MEDICINE CLINIC | Facility: CLINIC | Age: 85
End: 2020-08-31

## 2020-08-31 DIAGNOSIS — R26.9 NEUROLOGIC GAIT DYSFUNCTION: Primary | ICD-10-CM

## 2020-09-01 DIAGNOSIS — G40.209 PARTIAL SYMPTOMATIC EPILEPSY WITH COMPLEX PARTIAL SEIZURES, NOT INTRACTABLE, WITHOUT STATUS EPILEPTICUS (HCC): ICD-10-CM

## 2020-09-01 DIAGNOSIS — R26.9 NEUROLOGIC GAIT DYSFUNCTION: ICD-10-CM

## 2020-09-01 DIAGNOSIS — F03.90 DEMENTIA WITHOUT BEHAVIORAL DISTURBANCE, UNSPECIFIED DEMENTIA TYPE (HCC): Primary | ICD-10-CM

## 2020-09-02 ENCOUNTER — TELEPHONE (OUTPATIENT)
Dept: FAMILY MEDICINE CLINIC | Facility: CLINIC | Age: 85
End: 2020-09-02

## 2020-09-02 NOTE — TELEPHONE ENCOUNTER
Regarding: FW: Non-Urgent Medical Question  Contact: [phone]  Please send order for hospital bed and last office note and other information needed  ----- Message -----  From: Roberth Elmore  Sent: 2020   1:02 PM EDT  To: Juan Abraham MD  Subject: FW: Non-Urgent Medical Question                  [id number] [address] to fax?  ----- Message -----  From: Elle Bee MA  Sent: 2020   1:39 PM EDT  To: , #  Subject: RE: Non-Urgent Medical Question                  ----- Message from Elle Bee MA sent at 2020  1:39 PM EDT -----       ----- Message from Maryann Kaur to Juan Abraham MD sent at 2020  1:13 PM -----   Dr Maria Del Carmen Marroquin, I just spoke with [facility] supply  This is what is required from your office to obtain a hospital bed for my mom:    chart notes explaining the need for my mom  Patients demographics [employer], , billing address, etc)  Prescription - the prescription needs to read that it is for a "full electric" hospital bed  Please fax this information to [phone]  Once they have all the information, they will contact me to arrange for delivery  Thank you again in advance for your time and help  Reyna Powell  [phone] (cell, if needed)      ----- Message -----       Polly Blackburn MD       Sent:2020 12:08 PM EDT         To:Yulissa Dupree    Subject:RE: Non-Urgent Medical Question    [address]  In the [facility]      ----- Message -----       From:Yulissa Abdalla 11:57 AM EDT         Grecia Garcia MD    Subject:Non-Urgent Medical Question    Thank you  At which office can I  the order?      ----- Message -----       Polly Blackburn MD       Sent:2020  9:38 AM EDT         To:Yulissa Montemayor Artist: Non-Urgent Medical Question    I wrote an order for hospital bed  You should take that to a durable medical equipment supply store such as [employer]  They can help you from there    Perhaps she should call 1st       ----- Message -----       From:Yulissa Mansfieldlorelei President  8:16 AM MD [address] Dr,    Thank you for the prescription for a lift chair for my mom, Brent Jones, which we were able to obtain  We feel she is at the point that perhaps a hospital bed would be also very beneficial,  not just for herself, but also help with her care givers  I am unsure as to how to procedure with obtaining a hospital bed  In the past, when I cared for other family members, we had rented and it was covered by insurance  Can you please advise as to how I should proceed       Thank you    Duane Bustamante

## 2020-09-10 ENCOUNTER — TELEPHONE (OUTPATIENT)
Dept: FAMILY MEDICINE CLINIC | Facility: CLINIC | Age: 85
End: 2020-09-10

## 2020-09-10 NOTE — TELEPHONE ENCOUNTER
Order and office note faxed to [facility], confirmation received and pt's daughter is aware it was done

## 2020-09-10 NOTE — TELEPHONE ENCOUNTER
Dr Susi Jalloh  Pt's last office note needs to be addended  She is having difficulty getting her hospital bed  Medicare requires office note documentation:  Head of bed needs to be at 30 degrees due to Dx  And for frequent body positions due to Dx  Please addend note  I edited the order for Lenght of need/99- indefinite      Please let me know when note is addended so I can fax it to [employer]

## 2020-10-06 ENCOUNTER — TELEPHONE (OUTPATIENT)
Dept: GERIATRICS | Age: 85
End: 2020-10-06

## 2020-10-07 ENCOUNTER — OFFICE VISIT (OUTPATIENT)
Dept: GERIATRICS | Age: 85
End: 2020-10-07
Payer: MEDICARE

## 2020-10-07 VITALS
DIASTOLIC BLOOD PRESSURE: 62 MMHG | TEMPERATURE: 97.4 F | OXYGEN SATURATION: 97 % | HEART RATE: 52 BPM | SYSTOLIC BLOOD PRESSURE: 116 MMHG | RESPIRATION RATE: 12 BRPM

## 2020-10-07 DIAGNOSIS — Z71.89 GOALS OF CARE, COUNSELING/DISCUSSION: ICD-10-CM

## 2020-10-07 DIAGNOSIS — F03.90 DEMENTIA WITHOUT BEHAVIORAL DISTURBANCE, UNSPECIFIED DEMENTIA TYPE (HCC): Primary | ICD-10-CM

## 2020-10-07 DIAGNOSIS — R26.2 AMBULATORY DYSFUNCTION: ICD-10-CM

## 2020-10-07 DIAGNOSIS — G40.909 SEIZURE DISORDER (HCC): ICD-10-CM

## 2020-10-07 DIAGNOSIS — I10 ESSENTIAL HYPERTENSION: ICD-10-CM

## 2020-10-07 PROCEDURE — 99214 OFFICE O/P EST MOD 30 MIN: CPT | Performed by: FAMILY MEDICINE

## 2020-10-09 ENCOUNTER — TELEPHONE (OUTPATIENT)
Dept: GERIATRICS | Age: 85
End: 2020-10-09

## 2020-10-09 DIAGNOSIS — F03.90 DEMENTIA WITHOUT BEHAVIORAL DISTURBANCE, UNSPECIFIED DEMENTIA TYPE (HCC): Primary | ICD-10-CM

## 2020-10-11 PROBLEM — R26.2 AMBULATORY DYSFUNCTION: Status: ACTIVE | Noted: 2020-10-11

## 2020-12-02 ENCOUNTER — TELEPHONE (OUTPATIENT)
Dept: NEUROLOGY | Facility: CLINIC | Age: 85
End: 2020-12-02

## 2021-02-12 DIAGNOSIS — Z23 ENCOUNTER FOR IMMUNIZATION: ICD-10-CM

## 2021-03-03 ENCOUNTER — TELEPHONE (OUTPATIENT)
Dept: FAMILY MEDICINE CLINIC | Facility: CLINIC | Age: 86
End: 2021-03-03

## 2021-04-04 ENCOUNTER — TELEPHONE (OUTPATIENT)
Dept: FAMILY MEDICINE CLINIC | Facility: CLINIC | Age: 86
End: 2021-04-04

## 2021-09-30 ENCOUNTER — TELEPHONE (OUTPATIENT)
Dept: GERIATRICS | Age: 86
End: 2021-09-30

## 2021-09-30 NOTE — TELEPHONE ENCOUNTER
Pt daughter called office back and informed us that pt had passed away last December  Appointment has been canceled  I Put in a request for the chart to be changed to

## 2021-09-30 NOTE — TELEPHONE ENCOUNTER
----- Message from [place], VINCENTW sent at 9/30/2021 10:20 AM EDT -----  Regarding: Pt on hospice? Pt is scheduled to see Dr Kelsie Morocho on Wed 10/6 and was referred to hospice last year  Per Dr Kelsie Morocho: If pt is on hospice, we will cancel this appointment as their team is managing her care at this time  Please outreach Yulissa's daughter, Nafisa Cisse, to review whether she is on hospice and note that if so we will cancel her follow-up appointment with Dr Winnie Haile  Thank you
